# Patient Record
Sex: FEMALE | Race: WHITE | NOT HISPANIC OR LATINO | Employment: FULL TIME | URBAN - METROPOLITAN AREA
[De-identification: names, ages, dates, MRNs, and addresses within clinical notes are randomized per-mention and may not be internally consistent; named-entity substitution may affect disease eponyms.]

---

## 2017-03-10 ENCOUNTER — OFFICE VISIT (OUTPATIENT)
Dept: FAMILY MEDICINE CLINIC | Facility: CLINIC | Age: 57
End: 2017-03-10

## 2017-03-10 VITALS
HEART RATE: 97 BPM | OXYGEN SATURATION: 95 % | HEIGHT: 64 IN | SYSTOLIC BLOOD PRESSURE: 110 MMHG | RESPIRATION RATE: 16 BRPM | WEIGHT: 142.7 LBS | TEMPERATURE: 98.3 F | DIASTOLIC BLOOD PRESSURE: 80 MMHG | BODY MASS INDEX: 24.36 KG/M2

## 2017-03-10 DIAGNOSIS — M05.742 RHEUMATOID ARTHRITIS INVOLVING BOTH HANDS WITH POSITIVE RHEUMATOID FACTOR (HCC): ICD-10-CM

## 2017-03-10 DIAGNOSIS — M05.741 RHEUMATOID ARTHRITIS INVOLVING BOTH HANDS WITH POSITIVE RHEUMATOID FACTOR (HCC): ICD-10-CM

## 2017-03-10 DIAGNOSIS — Z85.3 HISTORY OF LOBULAR CARCINOMA OF BREAST: ICD-10-CM

## 2017-03-10 DIAGNOSIS — M85.80 OSTEOPENIA: ICD-10-CM

## 2017-03-10 DIAGNOSIS — I10 ESSENTIAL HYPERTENSION: ICD-10-CM

## 2017-03-10 DIAGNOSIS — Z00.00 HEALTH CARE MAINTENANCE: Primary | ICD-10-CM

## 2017-03-10 DIAGNOSIS — K21.9 GASTROESOPHAGEAL REFLUX DISEASE, ESOPHAGITIS PRESENCE NOT SPECIFIED: ICD-10-CM

## 2017-03-10 PROCEDURE — 99396 PREV VISIT EST AGE 40-64: CPT | Performed by: INTERNAL MEDICINE

## 2017-03-10 RX ORDER — ATENOLOL 25 MG/1
25 TABLET ORAL
COMMUNITY
End: 2017-03-10 | Stop reason: SDUPTHER

## 2017-03-10 RX ORDER — LEFLUNOMIDE 20 MG/1
10 TABLET ORAL DAILY
COMMUNITY
End: 2022-02-10

## 2017-03-10 RX ORDER — ATENOLOL 25 MG/1
25 TABLET ORAL DAILY
Qty: 90 TABLET | Refills: 3 | Status: SHIPPED | OUTPATIENT
Start: 2017-03-10 | End: 2018-04-16 | Stop reason: SDUPTHER

## 2017-03-10 RX ORDER — PANTOPRAZOLE SODIUM 40 MG/1
40 TABLET, DELAYED RELEASE ORAL DAILY
Qty: 90 TABLET | Refills: 3 | Status: SHIPPED | OUTPATIENT
Start: 2017-03-10 | End: 2018-05-11 | Stop reason: SDUPTHER

## 2017-03-10 RX ORDER — FLUCONAZOLE 150 MG/1
TABLET ORAL
Refills: 1 | COMMUNITY
Start: 2017-02-22 | End: 2017-03-27 | Stop reason: SDUPTHER

## 2017-03-10 RX ORDER — PANTOPRAZOLE SODIUM 40 MG/1
40 TABLET, DELAYED RELEASE ORAL DAILY
COMMUNITY
End: 2017-03-10 | Stop reason: SDUPTHER

## 2017-03-10 RX ORDER — MONTELUKAST SODIUM 10 MG/1
TABLET ORAL
COMMUNITY
Start: 2017-02-18 | End: 2018-05-11 | Stop reason: SDUPTHER

## 2017-03-10 RX ORDER — CIPROFLOXACIN 500 MG/1
TABLET, FILM COATED ORAL
COMMUNITY
Start: 2017-03-08 | End: 2017-03-17

## 2017-03-10 RX ORDER — ANASTROZOLE 1 MG/1
1 TABLET ORAL
COMMUNITY
End: 2018-07-16

## 2017-03-10 RX ORDER — IBANDRONATE SODIUM 150 MG/1
150 TABLET, FILM COATED ORAL
COMMUNITY
End: 2018-11-10 | Stop reason: HOSPADM

## 2017-03-10 RX ORDER — MULTIVITAMIN
1 CAPSULE ORAL DAILY
COMMUNITY

## 2017-03-10 NOTE — PROGRESS NOTES
Subjective   Patient ID: Bonnie Syed is a 56 y.o. female presents with   Chief Complaint   Patient presents with   • Annual Exam     New Patient- needs a new dr       HPI - this patient has a long history of rheumatoid arthritis currently she is on Remicade and I rave and is well controlled.  She also has a history of breast cancer and she is on her last year of arimadex.  She has osteopenia and is on Boniva calcium and vitamin D.  She gets routine labs done through her various specialists.  She is caught up on her colonoscopy and mammogram.  Overall she feels good and is active.  She does have some reflux and hypertension that are well controlled with current medicines we will refill those.    Assessment plan    Health care maintenance-caught up on all screenings recommend continued activity and screenings.    GERD refilled Protonix 40    Hypertension controlled with atenolol 25    Osteopenia Boniva    Estrogen receptor breast cancer she is on anti-hormonal therapy    RA-she sees rheumatology and is on medicines.    Recent UTI she is on Diflucan and Cipro.    Allergies   Allergen Reactions   • Latex    • Sulfa Antibiotics        The following portions of the patient's history were reviewed and updated as appropriate: allergies, current medications, past family history, past medical history, past social history, past surgical history and problem list.      Review of Systems   Constitutional: Negative.    HENT: Negative.    Eyes: Negative.    Respiratory: Negative.    Cardiovascular: Negative.    Gastrointestinal: Negative.    Endocrine: Negative.    Genitourinary: Positive for dysuria.        Dysuria is improving   Musculoskeletal: Positive for arthralgias and joint swelling.   Skin: Negative.    Allergic/Immunologic: Negative.    Neurological: Negative.    Hematological: Negative.    Psychiatric/Behavioral: Negative.        Objective     Vitals:    03/10/17 1534   BP: 110/80   Pulse: 97   Resp: 16   Temp: 98.3 °F  "(36.8 °C)   TempSrc: Oral   SpO2: 95%   Weight: 142 lb 11.2 oz (64.7 kg)   Height: 64\" (162.6 cm)         Physical Exam   Constitutional: She is oriented to person, place, and time. She appears well-developed and well-nourished. No distress.   HENT:   Head: Normocephalic and atraumatic.   Eyes: Conjunctivae and EOM are normal. Pupils are equal, round, and reactive to light. Right eye exhibits no discharge. Left eye exhibits no discharge. No scleral icterus.   Neck: Normal range of motion. Neck supple. No tracheal deviation present. No thyromegaly present.   Cardiovascular: Normal rate, regular rhythm, normal heart sounds and normal pulses.  Exam reveals no gallop.    No murmur heard.  Pulmonary/Chest: Effort normal and breath sounds normal. No respiratory distress. She has no wheezes. She has no rales.   Abdominal: Soft. Bowel sounds are normal. There is no tenderness.   Musculoskeletal: Normal range of motion.   Neurological: She is alert and oriented to person, place, and time.   Skin: Skin is warm and dry. No rash noted. No erythema. No pallor.   Psychiatric: She has a normal mood and affect. Her behavior is normal. Judgment and thought content normal.   Nursing note and vitals reviewed.        Bonnie was seen today for annual exam.    Diagnoses and all orders for this visit:    Health care maintenance    Rheumatoid arthritis involving both hands with positive rheumatoid factor  -     Lipid Panel  -     TSH+Free T4  -     Comprehensive Metabolic Panel    Gastroesophageal reflux disease, esophagitis presence not specified  -     Lipid Panel  -     TSH+Free T4  -     Comprehensive Metabolic Panel    Essential hypertension  -     Lipid Panel  -     TSH+Free T4  -     Comprehensive Metabolic Panel    Osteopenia  -     Lipid Panel  -     TSH+Free T4  -     Comprehensive Metabolic Panel    History of lobular carcinoma of breast  -     Lipid Panel  -     TSH+Free T4  -     Comprehensive Metabolic Panel        Call or " return to clinic prn if these symptoms worsen or fail to improve as anticipated.

## 2017-03-17 ENCOUNTER — OFFICE VISIT (OUTPATIENT)
Dept: FAMILY MEDICINE CLINIC | Facility: CLINIC | Age: 57
End: 2017-03-17

## 2017-03-17 VITALS
WEIGHT: 142 LBS | TEMPERATURE: 98.5 F | HEIGHT: 64 IN | RESPIRATION RATE: 16 BRPM | BODY MASS INDEX: 24.24 KG/M2 | SYSTOLIC BLOOD PRESSURE: 110 MMHG | HEART RATE: 102 BPM | DIASTOLIC BLOOD PRESSURE: 80 MMHG | OXYGEN SATURATION: 99 %

## 2017-03-17 DIAGNOSIS — I10 ESSENTIAL HYPERTENSION: Primary | ICD-10-CM

## 2017-03-17 DIAGNOSIS — M05.742 RHEUMATOID ARTHRITIS INVOLVING BOTH HANDS WITH POSITIVE RHEUMATOID FACTOR (HCC): ICD-10-CM

## 2017-03-17 DIAGNOSIS — N30.01 ACUTE CYSTITIS WITH HEMATURIA: ICD-10-CM

## 2017-03-17 DIAGNOSIS — M05.741 RHEUMATOID ARTHRITIS INVOLVING BOTH HANDS WITH POSITIVE RHEUMATOID FACTOR (HCC): ICD-10-CM

## 2017-03-17 LAB
BILIRUB BLD-MCNC: NEGATIVE MG/DL
CLARITY, POC: CLEAR
COLOR UR: YELLOW
GLUCOSE UR STRIP-MCNC: NEGATIVE MG/DL
KETONES UR QL: NEGATIVE
LEUKOCYTE EST, POC: NEGATIVE
NITRITE UR-MCNC: NEGATIVE MG/ML
PH UR: 5.5 [PH] (ref 5–8)
PROT UR STRIP-MCNC: NEGATIVE MG/DL
RBC # UR STRIP: NEGATIVE /UL
SP GR UR: 1.02 (ref 1–1.03)
UROBILINOGEN UR QL: NORMAL

## 2017-03-17 PROCEDURE — 96372 THER/PROPH/DIAG INJ SC/IM: CPT | Performed by: FAMILY MEDICINE

## 2017-03-17 PROCEDURE — 81003 URINALYSIS AUTO W/O SCOPE: CPT | Performed by: FAMILY MEDICINE

## 2017-03-17 PROCEDURE — 99213 OFFICE O/P EST LOW 20 MIN: CPT | Performed by: FAMILY MEDICINE

## 2017-03-17 RX ORDER — CEFTRIAXONE 500 MG/1
500 INJECTION, POWDER, FOR SOLUTION INTRAMUSCULAR; INTRAVENOUS EVERY 24 HOURS
Status: DISCONTINUED | OUTPATIENT
Start: 2017-03-17 | End: 2018-05-11

## 2017-03-17 RX ORDER — PHENAZOPYRIDINE HYDROCHLORIDE 200 MG/1
200 TABLET, FILM COATED ORAL 3 TIMES DAILY PRN
Qty: 20 TABLET | Refills: 0 | Status: SHIPPED | OUTPATIENT
Start: 2017-03-17 | End: 2017-08-07

## 2017-03-17 RX ADMIN — CEFTRIAXONE 500 MG: 500 INJECTION, POWDER, FOR SOLUTION INTRAMUSCULAR; INTRAVENOUS at 13:43

## 2017-03-17 NOTE — PROGRESS NOTES
"Subjective   Bonnie Syed is a 56 y.o. female presenting with   Chief Complaint   Patient presents with   • Urinary Frequency   • Abdominal Pain     lower abdominal area        HPI Comments: This is a very nice 56-year-old white female nonsmoker with a history of rheumatoid arthritis since she was 3 years old and also a history of breast cancer that is currently in remission who normally gets Remicade infusions and is due for her next one next week.  However last week she developed abdominal pain and cramping and [facility] where she was found to have a urinary tract infection and given Cipro.  She said it did nothing and she continued to have marked dysuria.  She had some Macrobid that she never took for a past infection so she began on that and said she is getting better now but still has some cramping and wanted to come in to get checked.  She also says that she is concerned about the Remicade infusion under the care of her rheumatologist, Dr. Staples.  I suggested she put that off for a week and she seems to like that idea.    Fortunately she does not have any high fever or chills or flank pain or dizziness or loss of consciousness.  She still has 4 more days of the Macrobid and her urinalysis today is normal, but nevertheless because of the immunosuppression, I will request a urine culture and administer Rocephin.    Urinary Frequency    Associated symptoms include frequency.   Abdominal Pain   Associated symptoms include dysuria and frequency.        The following portions of the patient's history were reviewed and updated as appropriate: current medications, past family history, past medical history, past social history, past surgical history and problem list.    Review of Systems   Gastrointestinal: Positive for abdominal pain.   Genitourinary: Positive for dysuria and frequency.   All other systems reviewed and are negative.      Objective   Physical Exam   Constitutional: She is oriented to " person, place, and time. She appears well-developed and well-nourished. No distress.   HENT:   Head: Normocephalic and atraumatic.   Eyes: EOM are normal. Pupils are equal, round, and reactive to light.   Neck: Normal range of motion. Neck supple. No thyromegaly present.   Cardiovascular: Normal rate and regular rhythm.    No murmur heard.  Pulmonary/Chest: Effort normal and breath sounds normal. No respiratory distress. She has no wheezes.   Abdominal: Soft. Bowel sounds are normal. She exhibits no distension and no mass. There is no tenderness. There is no guarding.   Musculoskeletal: Normal range of motion. She exhibits deformity (deformity of multiple joints in her hands).   Lymphadenopathy:     She has no cervical adenopathy.   Neurological: She is alert and oriented to person, place, and time.   Skin: Skin is warm and dry. She is not diaphoretic.   Psychiatric: She has a normal mood and affect. Her behavior is normal.   Nursing note and vitals reviewed.      Assessment/Plan   Bonnie was seen today for urinary frequency and abdominal pain.    Diagnoses and all orders for this visit:    Essential hypertension    Rheumatoid arthritis involving both hands with positive rheumatoid factor    Acute cystitis with hematuria  -     cefTRIAXone (ROCEPHIN) injection 500 mg; Inject 500 mg into the shoulder, thigh, or buttocks Daily.  -     Urine Culture    Other orders  -     phenazopyridine (PYRIDIUM) 200 MG tablet; Take 1 tablet by mouth 3 (Three) Times a Day As Needed for bladder spasms.                   I would like him to return for another visit in 6 month(s)

## 2017-03-17 NOTE — PATIENT INSTRUCTIONS
This is a very nice 56-year-old who has rheumatoid arthritis on Remicade and now has acute cystitis.  I will request a culture and give her 500 mg of ceftriaxone.  I would like her to call if she does not get better.

## 2017-03-19 LAB
BACTERIA UR CULT: NO GROWTH
BACTERIA UR CULT: NORMAL

## 2017-03-23 ENCOUNTER — TELEPHONE (OUTPATIENT)
Dept: FAMILY MEDICINE CLINIC | Facility: CLINIC | Age: 57
End: 2017-03-23

## 2017-03-23 NOTE — TELEPHONE ENCOUNTER
Still having uti symptoms her culture that carreon did came back normal but she is still having a slight problem. She wants to know if you would call something in for her?

## 2017-03-24 RX ORDER — CIPROFLOXACIN 500 MG/1
500 TABLET, FILM COATED ORAL 2 TIMES DAILY
Qty: 10 TABLET | Refills: 0 | Status: SHIPPED | OUTPATIENT
Start: 2017-03-24 | End: 2017-08-07

## 2017-03-24 RX ORDER — DOXYCYCLINE HYCLATE 100 MG
100 TABLET ORAL 2 TIMES DAILY
Qty: 14 TABLET | Refills: 0 | Status: SHIPPED | OUTPATIENT
Start: 2017-03-24 | End: 2017-08-07

## 2017-03-24 RX ORDER — NITROFURANTOIN 25; 75 MG/1; MG/1
100 CAPSULE ORAL 2 TIMES DAILY
Qty: 14 CAPSULE | Refills: 0 | Status: SHIPPED | OUTPATIENT
Start: 2017-03-24 | End: 2017-08-07

## 2017-03-24 NOTE — TELEPHONE ENCOUNTER
She states she can not take Cipro, she was taking microbid and wants to know if we can call this in for her?    She also said she will need diflucan with any antibiotic as well.

## 2017-03-24 NOTE — TELEPHONE ENCOUNTER
I saw her once when Dr. KEYS was out, but he has only seen her once too, so if he wants me to take over, I will be happy to.

## 2017-03-27 RX ORDER — FLUCONAZOLE 150 MG/1
150 TABLET ORAL DAILY
Qty: 1 TABLET | Refills: 1 | Status: SHIPPED | OUTPATIENT
Start: 2017-03-27 | End: 2017-08-07

## 2017-03-29 ENCOUNTER — TELEPHONE (OUTPATIENT)
Dept: FAMILY MEDICINE CLINIC | Facility: CLINIC | Age: 57
End: 2017-03-29

## 2017-03-29 RX ORDER — METHENAMINE, SODIUM PHOSPHATE, MONOBASIC, MONOHYDRATE, PHENYL SALICYLATE, METHYLENE BLUE, AND HYOSCYAMINE SULFATE 120; 40.8; 36; 10; .12 MG/1; MG/1; MG/1; MG/1; MG/1
1 CAPSULE ORAL 4 TIMES DAILY PRN
Qty: 20 CAPSULE | Refills: 0 | Status: SHIPPED | OUTPATIENT
Start: 2017-03-29 | End: 2017-08-07

## 2017-03-29 NOTE — TELEPHONE ENCOUNTER
Please inform the patient that I sent in a medicine for bladder discomfort.  I will be glad to set her up with a uro-gynecologist if she would like for further evaluation and treatment

## 2017-03-29 NOTE — TELEPHONE ENCOUNTER
Pt informed and will think about going to a uro- gyn and will call us if she decides to go that route

## 2017-03-29 NOTE — TELEPHONE ENCOUNTER
Bonnie said that she has been on the Doxycycline since you called it in and still has no relief. She states she has been on the computer on [employer] and thinks she is having bladder spasms. She wants to know can she get anything called in to help her with this? or do you think that there is something else she should be taking to help the uti?

## 2017-08-07 ENCOUNTER — TELEPHONE (OUTPATIENT)
Dept: FAMILY MEDICINE CLINIC | Facility: CLINIC | Age: 57
End: 2017-08-07

## 2017-08-07 ENCOUNTER — OFFICE VISIT (OUTPATIENT)
Dept: FAMILY MEDICINE CLINIC | Facility: CLINIC | Age: 57
End: 2017-08-07

## 2017-08-07 VITALS
HEIGHT: 64 IN | WEIGHT: 146 LBS | TEMPERATURE: 98.2 F | RESPIRATION RATE: 16 BRPM | SYSTOLIC BLOOD PRESSURE: 120 MMHG | DIASTOLIC BLOOD PRESSURE: 84 MMHG | BODY MASS INDEX: 24.92 KG/M2 | HEART RATE: 94 BPM | OXYGEN SATURATION: 98 %

## 2017-08-07 DIAGNOSIS — B37.31 VAGINAL YEAST INFECTION: Primary | ICD-10-CM

## 2017-08-07 DIAGNOSIS — R39.9 UTI SYMPTOMS: Primary | ICD-10-CM

## 2017-08-07 DIAGNOSIS — N30.01 ACUTE CYSTITIS WITH HEMATURIA: ICD-10-CM

## 2017-08-07 LAB
BILIRUB BLD-MCNC: NEGATIVE MG/DL
CLARITY, POC: CLEAR
COLOR UR: YELLOW
GLUCOSE UR STRIP-MCNC: NEGATIVE MG/DL
KETONES UR QL: NEGATIVE
LEUKOCYTE EST, POC: NEGATIVE
NITRITE UR-MCNC: NEGATIVE MG/ML
PH UR: 6.5 [PH] (ref 5–8)
PROT UR STRIP-MCNC: NEGATIVE MG/DL
RBC # UR STRIP: NEGATIVE /UL
SP GR UR: 1.02 (ref 1–1.03)
UROBILINOGEN UR QL: NORMAL

## 2017-08-07 PROCEDURE — 81003 URINALYSIS AUTO W/O SCOPE: CPT | Performed by: INTERNAL MEDICINE

## 2017-08-07 PROCEDURE — 99213 OFFICE O/P EST LOW 20 MIN: CPT | Performed by: INTERNAL MEDICINE

## 2017-08-07 RX ORDER — FLUCONAZOLE 200 MG/1
200 TABLET ORAL DAILY
Qty: 1 TABLET | Refills: 0 | Status: SHIPPED | OUTPATIENT
Start: 2017-08-07 | End: 2018-07-16

## 2017-08-07 NOTE — PROGRESS NOTES
"Subjective   Patient ID: Bonnie Syed is a 56 y.o. female presents with   Chief Complaint   Patient presents with   • Urinary Tract Infection       HPI - This patient has had a UTI starting back in July she was on Macrobid and then they recultured her and put her on another antibiotic.  She still worried she may have one she has lower sacral back pain which is not uncommon for her she has no dysuria frequency or urgency.  And no fever or flank pain.  Her urinalysis was completely normal    Assessment plan    Lumbosacral pain-she will go to her chiropractor    Vaginal yeast infection Diflucan    Acute cystitis-resolved    Allergies   Allergen Reactions   • Sulfa Antibiotics        The following portions of the patient's history were reviewed and updated as appropriate: allergies, current medications, past family history, past medical history, past social history, past surgical history and problem list.      Review of Systems   Constitutional: Negative.    Genitourinary: Negative.    Musculoskeletal: Positive for back pain.       Objective     Vitals:    08/07/17 0955   BP: 120/84   Pulse: 94   Resp: 16   Temp: 98.2 °F (36.8 °C)   TempSrc: Oral   SpO2: 98%   Weight: 146 lb (66.2 kg)   Height: 64\" (162.6 cm)         Physical Exam   Constitutional: She appears well-developed and well-nourished.   Abdominal: Soft. She exhibits no distension. There is no tenderness.   Psychiatric: She has a normal mood and affect. Her behavior is normal.   Nursing note and vitals reviewed.        Bonnie was seen today for urinary tract infection.    Diagnoses and all orders for this visit:    Vaginal yeast infection    Acute cystitis with hematuria    Other orders  -     fluconazole (DIFLUCAN) 200 MG tablet; Take 1 tablet by mouth Daily.        Call or return to clinic prn if these symptoms worsen or fail to improve as anticipated.  "

## 2017-08-09 LAB
BACTERIA UR CULT: NO GROWTH
BACTERIA UR CULT: NORMAL

## 2017-08-17 ENCOUNTER — TELEPHONE (OUTPATIENT)
Dept: FAMILY MEDICINE CLINIC | Facility: CLINIC | Age: 57
End: 2017-08-17

## 2017-08-17 NOTE — TELEPHONE ENCOUNTER
Could you get the phone number and address to Dr. Shaikh who is a uro-gynecologist and send this to the patient.

## 2017-08-17 NOTE — TELEPHONE ENCOUNTER
Bonnie wants the name and number of that uro-gyn dr you refer to because she feels like she is going into menopause.

## 2017-12-04 ENCOUNTER — TELEPHONE (OUTPATIENT)
Dept: FAMILY MEDICINE CLINIC | Facility: CLINIC | Age: 57
End: 2017-12-04

## 2017-12-04 NOTE — TELEPHONE ENCOUNTER
Bonnie takes Remacaid infusions once a month and she wants to know should she take a pneumonia and a shingles vaccine? She is unsure if she ever had chicken pox? Can you take these together? And can we do these if she needs them?

## 2017-12-04 NOTE — TELEPHONE ENCOUNTER
She can take the pneumonia shot but she cannot take the shingles vaccine if she is on Remicade.  Usually we don't have people take the shingles vaccine until age 60 any way

## 2018-04-16 RX ORDER — ATENOLOL 25 MG/1
25 TABLET ORAL DAILY
Qty: 90 TABLET | Refills: 0 | Status: SHIPPED | OUTPATIENT
Start: 2018-04-16 | End: 2018-07-19 | Stop reason: SDUPTHER

## 2018-05-11 ENCOUNTER — OFFICE VISIT (OUTPATIENT)
Dept: FAMILY MEDICINE CLINIC | Facility: CLINIC | Age: 58
End: 2018-05-11

## 2018-05-11 VITALS
SYSTOLIC BLOOD PRESSURE: 140 MMHG | HEART RATE: 106 BPM | BODY MASS INDEX: 24.24 KG/M2 | WEIGHT: 142 LBS | HEIGHT: 64 IN | RESPIRATION RATE: 18 BRPM | OXYGEN SATURATION: 94 % | DIASTOLIC BLOOD PRESSURE: 84 MMHG | TEMPERATURE: 98.5 F

## 2018-05-11 DIAGNOSIS — R21 RASH: Primary | ICD-10-CM

## 2018-05-11 PROCEDURE — 99213 OFFICE O/P EST LOW 20 MIN: CPT | Performed by: INTERNAL MEDICINE

## 2018-05-11 RX ORDER — BUSPIRONE HYDROCHLORIDE 5 MG/1
5 TABLET ORAL 3 TIMES DAILY PRN
Qty: 20 TABLET | Refills: 1 | Status: SHIPPED | OUTPATIENT
Start: 2018-05-11 | End: 2018-05-11 | Stop reason: SDUPTHER

## 2018-05-11 RX ORDER — VALACYCLOVIR HYDROCHLORIDE 1 G/1
1000 TABLET, FILM COATED ORAL 3 TIMES DAILY
Qty: 21 TABLET | Refills: 0 | Status: SHIPPED | OUTPATIENT
Start: 2018-05-11 | End: 2018-07-16

## 2018-05-11 RX ORDER — PANTOPRAZOLE SODIUM 40 MG/1
40 TABLET, DELAYED RELEASE ORAL DAILY
Qty: 90 TABLET | Refills: 3 | Status: SHIPPED | OUTPATIENT
Start: 2018-05-11 | End: 2018-07-19 | Stop reason: SDUPTHER

## 2018-05-11 RX ORDER — LEFLUNOMIDE 20 MG/1
TABLET ORAL
COMMUNITY
End: 2018-05-11 | Stop reason: SDUPTHER

## 2018-05-11 RX ORDER — PANTOPRAZOLE SODIUM 40 MG/1
TABLET, DELAYED RELEASE ORAL
COMMUNITY
Start: 2017-03-10 | End: 2018-05-11 | Stop reason: SDUPTHER

## 2018-05-11 RX ORDER — BUSPIRONE HYDROCHLORIDE 5 MG/1
5 TABLET ORAL 3 TIMES DAILY PRN
Qty: 20 TABLET | Refills: 1 | Status: SHIPPED | OUTPATIENT
Start: 2018-05-11 | End: 2018-12-28

## 2018-05-11 RX ORDER — MONTELUKAST SODIUM 10 MG/1
10 TABLET ORAL DAILY
COMMUNITY
Start: 2017-02-18

## 2018-05-11 RX ORDER — VALACYCLOVIR HYDROCHLORIDE 1 G/1
1000 TABLET, FILM COATED ORAL 3 TIMES DAILY
Qty: 21 TABLET | Refills: 0 | Status: SHIPPED | OUTPATIENT
Start: 2018-05-11 | End: 2018-05-11 | Stop reason: SDUPTHER

## 2018-05-11 NOTE — PROGRESS NOTES
"Subjective   Patient ID: Bonnie Syed is a 57 y.o. female presents with   Chief Complaint   Patient presents with   • Rash     x1 days       HPI - This patient presents today with a rash on her back ×1 days not painful.  There is no blisters.  She was concerned.    Assessment plan    Rash-unknown etiology does not look vesicular at this point we will go ahead and send in a prescription of Valtrex to take if this develops into a vesicular shingles rash.    Allergies   Allergen Reactions   • Sulfa Antibiotics        The following portions of the patient's history were reviewed and updated as appropriate: allergies, current medications, past family history, past medical history, past social history, past surgical history and problem list.      Review of Systems   Constitutional: Negative.    Respiratory: Negative.    Cardiovascular: Negative.    Skin: Positive for rash.        Slight erythematous rash looks more traumatic, does not look bacterial and currently does not look like shingles       Objective     Vitals:    05/11/18 1259   BP: 140/84   Pulse: 106   Resp: 18   Temp: 98.5 °F (36.9 °C)   TempSrc: Oral   SpO2: 94%   Weight: 64.4 kg (142 lb)   Height: 162.6 cm (64\")         Physical Exam   Constitutional: She is oriented to person, place, and time. She appears well-developed and well-nourished.   Neurological: She is alert and oriented to person, place, and time.   Skin: Rash noted.   Slight erythema, looks more traumatic does not look bacterial and currently does not look like shingles   Nursing note and vitals reviewed.        Bonnie was seen today for rash.    Diagnoses and all orders for this visit:    Rash    Other orders  -     Discontinue: valACYclovir (VALTREX) 1000 MG tablet; Take 1 tablet by mouth 3 (Three) Times a Day.  -     Discontinue: busPIRone (BUSPAR) 5 MG tablet; Take 1 tablet by mouth 3 (Three) Times a Day As Needed (anxiety).  -     valACYclovir (VALTREX) 1000 MG tablet; Take 1 tablet by mouth 3 " (Three) Times a Day.        Call or return to clinic prn if these symptoms worsen or fail to improve as anticipated.

## 2018-07-16 ENCOUNTER — OFFICE VISIT (OUTPATIENT)
Dept: FAMILY MEDICINE CLINIC | Facility: CLINIC | Age: 58
End: 2018-07-16

## 2018-07-16 VITALS
TEMPERATURE: 97.8 F | RESPIRATION RATE: 15 BRPM | BODY MASS INDEX: 24.24 KG/M2 | HEIGHT: 64 IN | SYSTOLIC BLOOD PRESSURE: 118 MMHG | HEART RATE: 75 BPM | DIASTOLIC BLOOD PRESSURE: 80 MMHG | OXYGEN SATURATION: 98 % | WEIGHT: 142 LBS

## 2018-07-16 DIAGNOSIS — R39.9 UTI SYMPTOMS: ICD-10-CM

## 2018-07-16 DIAGNOSIS — K21.9 GASTROESOPHAGEAL REFLUX DISEASE, ESOPHAGITIS PRESENCE NOT SPECIFIED: ICD-10-CM

## 2018-07-16 DIAGNOSIS — Z92.21 HISTORY OF AROMATASE INHIBITOR THERAPY: ICD-10-CM

## 2018-07-16 DIAGNOSIS — N30.01 ACUTE CYSTITIS WITH HEMATURIA: ICD-10-CM

## 2018-07-16 DIAGNOSIS — I10 ESSENTIAL HYPERTENSION: Primary | ICD-10-CM

## 2018-07-16 DIAGNOSIS — Z23 NEED FOR TDAP VACCINATION: ICD-10-CM

## 2018-07-16 DIAGNOSIS — M85.80 OSTEOPENIA, UNSPECIFIED LOCATION: ICD-10-CM

## 2018-07-16 LAB
BILIRUB BLD-MCNC: NEGATIVE MG/DL
CLARITY, POC: CLEAR
COLOR UR: YELLOW
GLUCOSE UR STRIP-MCNC: NEGATIVE MG/DL
KETONES UR QL: NEGATIVE
LEUKOCYTE EST, POC: NEGATIVE
NITRITE UR-MCNC: NEGATIVE MG/ML
PH UR: 6.5 [PH] (ref 5–8)
PROT UR STRIP-MCNC: NEGATIVE MG/DL
RBC # UR STRIP: ABNORMAL /UL
SP GR UR: 1.02 (ref 1–1.03)
UROBILINOGEN UR QL: NORMAL

## 2018-07-16 PROCEDURE — 99213 OFFICE O/P EST LOW 20 MIN: CPT | Performed by: FAMILY MEDICINE

## 2018-07-16 PROCEDURE — 81003 URINALYSIS AUTO W/O SCOPE: CPT | Performed by: FAMILY MEDICINE

## 2018-07-16 NOTE — PROGRESS NOTES
Subjective   Bonnie Syed is a 57 y.o. female.     The patient to me transferring from Dr. Ram with hypertension and recent urinary tract infection.  5 day course of Macrobid about 6 days ago as no residual symptoms.  She has had problems with recurring urinary tract infections while on Remicade.  Takes both Remicade and Arava for her rheumatoid arthritis.  She takes pantoprazole for GERD be interested in getting off of that if she can.  She uses Boniva for osteoporosis sees gynecology and rheumatology.  Had 10 years of hormonal therapy after her breast cancer surgery.  Standing hypertension has been well controlled.  She does work on exercise.         The following portions of the patient's history were reviewed and updated as appropriate: allergies, current medications, past family history, past medical history, past social history, past surgical history and problem list.    Review of Systems   Constitutional: Negative.  Negative for chills and fever.   HENT: Negative.  Negative for congestion, rhinorrhea and sore throat.    Eyes: Negative.  Negative for discharge and visual disturbance.   Respiratory: Negative.  Negative for cough and shortness of breath.    Cardiovascular: Negative.  Negative for chest pain.   Gastrointestinal: Negative.  Negative for abdominal pain, constipation, diarrhea, nausea and vomiting.   Endocrine: Negative.  Negative for polydipsia.   Genitourinary: Negative.  Negative for dysuria and hematuria.   Musculoskeletal: Negative.  Negative for arthralgias and myalgias.   Skin: Negative.  Negative for rash.   Allergic/Immunologic: Negative.    Neurological: Negative.  Negative for weakness, numbness and headaches.   Hematological: Negative.  Does not bruise/bleed easily.   Psychiatric/Behavioral: Negative.  Negative for dysphoric mood. The patient is not nervous/anxious.    All other systems reviewed and are negative.      Objective   Physical Exam   Constitutional: She is oriented to  person, place, and time. She appears well-developed and well-nourished.   HENT:   Head: Normocephalic and atraumatic.   Right Ear: External ear normal.   Left Ear: External ear normal.   Mouth/Throat: No oropharyngeal exudate.   Eyes: Conjunctivae, EOM and lids are normal. Pupils are equal, round, and reactive to light. Right eye exhibits no discharge. Left eye exhibits no discharge. No scleral icterus.   Neck: Trachea normal, normal range of motion and phonation normal. Neck supple. No thyromegaly present.   Cardiovascular: Normal rate, regular rhythm and normal heart sounds.  Exam reveals no gallop and no friction rub.    No murmur heard.  Pulmonary/Chest: Effort normal and breath sounds normal. No stridor. She has no wheezes. She has no rales.   Abdominal: Soft. She exhibits no distension. There is no tenderness.   Musculoskeletal: Normal range of motion. She exhibits no edema.   Lymphadenopathy:     She has no cervical adenopathy.   Neurological: She is alert and oriented to person, place, and time. She has normal strength. No cranial nerve deficit.   Skin: Skin is warm, dry and intact. No petechiae and no rash noted. No cyanosis. Nails show no clubbing.   Psychiatric: She has a normal mood and affect. Her speech is normal and behavior is normal. Judgment and thought content normal. Cognition and memory are normal.   Nursing note and vitals reviewed.    Reviewed labs from last year showed normal hepatic and renal function.  She does need a lipid profile.  T death needs to be updated.    UA to recheck on her urinary tract infection shows trace dip red blood cells.  Culture is pending.  Postpone her Remicade infusion of until we have clarify the issue of whether she still has urinary infection.  She will postpone fasting labs and T death until after the 31st to get the required interval and after her infusion.    She will follow up with one of my colleagues.    Assessment/Plan   Bonnie was seen today for establish  care and urinary tract infection.    Diagnoses and all orders for this visit:    Essential hypertension  -     CBC & Differential; Future  -     Comprehensive Metabolic Panel; Future  -     Lipid Panel; Future    UTI symptoms  -     POCT urinalysis dipstick, automated    Acute cystitis with hematuria    Gastroesophageal reflux disease, esophagitis presence not specified    History of aromatase inhibitor therapy  -     Urine Culture - Urine, Urine, Clean Catch    Osteopenia, unspecified location  -     Urine Culture - Urine, Urine, Clean Catch  -     CBC & Differential; Future  -     Comprehensive Metabolic Panel; Future  -     Lipid Panel; Future    Need for Tdap vaccination    Other orders  -     calcium carbonate-vitamin d (CALTRATE 600+D) 600-400 MG-UNIT per tablet; One tab po BID for osteopenia  -     Tdap Vaccine Greater Than or Equal To 6yo IM; Future      Patient Instructions   Start using the OTC Zantac/ranitidine 150 mg twice daily and then taper pantoprazole--  Every other day x 14 days then  Every third x 14 days then  Every fourth day x 14 days then  Every fifth for 14 days and then stop    Use either liquid Gaviscon or chewable calcium carbonate antacid as needed    Goals:  150 minutes of aerobic/cardio training weekly, 30 minute intervals  Combine that with resistance/strength training  Suggestion:  4 days-->40 minutes aerobic/20 minutes resistance  Watch carbohydrates (sugars/starches)    We will contact you about labs    Fasting labs and Tdap after July 31     Schedule 6 month recheck with provider of choice    IT IS VERY IMPORTANT THAT YOU KEEP A PRINTED LIST OF ALL OF YOUR MEDICATIONS AND DOSES AND OF ALL MEDICATION ALLERGIES WITH YOU/ON YOUR PERSON AT ALL TIMES.  THIS IS OF CRITICAL IMPORTANCE IN THE EVENT THAT YOU ARE INJURED OR ILL AND ARE UNABLE TO CONVEY THIS INFORMATION TO THOSE CARING FOR YOU IN AN EMERGENCY SITUATION.                    EMR Dragon/Transcription disclaimer:   Much of this  encounter note is an electronic transcription/translation of spoken language to printed text. The electronic translation of spoken language may permit erroneous, or at times, nonsensical words or phrases to be inadvertently transcribed; Although I have reviewed the note for such errors, some may still exist. Please contact me with any questions or concerns about the conduct of this encounter note.

## 2018-07-16 NOTE — PATIENT INSTRUCTIONS
Start using the OTC Zantac/ranitidine 150 mg twice daily and then taper pantoprazole--  Every other day x 14 days then  Every third x 14 days then  Every fourth day x 14 days then  Every fifth for 14 days and then stop    Use either liquid Gaviscon or chewable calcium carbonate antacid as needed    Goals:  150 minutes of aerobic/cardio training weekly, 30 minute intervals  Combine that with resistance/strength training  Suggestion:  4 days-->40 minutes aerobic/20 minutes resistance  Watch carbohydrates (sugars/starches)    We will contact you about labs    Fasting labs and Tdap after July 31     Schedule 6 month recheck with provider of choice    IT IS VERY IMPORTANT THAT YOU KEEP A PRINTED LIST OF ALL OF YOUR MEDICATIONS AND DOSES AND OF ALL MEDICATION ALLERGIES WITH YOU/ON YOUR PERSON AT ALL TIMES.  THIS IS OF CRITICAL IMPORTANCE IN THE EVENT THAT YOU ARE INJURED OR ILL AND ARE UNABLE TO CONVEY THIS INFORMATION TO THOSE CARING FOR YOU IN AN EMERGENCY SITUATION.

## 2018-07-17 ENCOUNTER — TELEPHONE (OUTPATIENT)
Dept: SPORTS MEDICINE | Facility: CLINIC | Age: 58
End: 2018-07-17

## 2018-07-17 RX ORDER — FLUCONAZOLE 150 MG/1
TABLET ORAL
Qty: 2 TABLET | Refills: 0 | Status: SHIPPED | OUTPATIENT
Start: 2018-07-17 | End: 2018-08-20 | Stop reason: SDUPTHER

## 2018-07-17 RX ORDER — CEPHALEXIN 500 MG/1
CAPSULE ORAL
Qty: 21 CAPSULE | Refills: 0 | Status: SHIPPED | OUTPATIENT
Start: 2018-07-17 | End: 2018-08-20

## 2018-07-18 LAB
BACTERIA UR CULT: NO GROWTH
BACTERIA UR CULT: NORMAL

## 2018-07-19 RX ORDER — ATENOLOL 25 MG/1
25 TABLET ORAL DAILY
Qty: 90 TABLET | Refills: 3 | Status: SHIPPED | OUTPATIENT
Start: 2018-07-19 | End: 2019-06-19 | Stop reason: SDUPTHER

## 2018-07-19 RX ORDER — PANTOPRAZOLE SODIUM 40 MG/1
40 TABLET, DELAYED RELEASE ORAL DAILY
Qty: 90 TABLET | Refills: 3 | Status: SHIPPED | OUTPATIENT
Start: 2018-07-19 | End: 2018-12-28

## 2018-07-24 ENCOUNTER — TELEPHONE (OUTPATIENT)
Dept: FAMILY MEDICINE CLINIC | Facility: CLINIC | Age: 58
End: 2018-07-24

## 2018-07-25 NOTE — TELEPHONE ENCOUNTER
Dr Abbott will see this patient. Please call and schedule thanks also let her know he doesn't prescribe controlled meds

## 2018-07-31 ENCOUNTER — RESULTS ENCOUNTER (OUTPATIENT)
Dept: FAMILY MEDICINE CLINIC | Facility: CLINIC | Age: 58
End: 2018-07-31

## 2018-07-31 DIAGNOSIS — I10 ESSENTIAL HYPERTENSION: ICD-10-CM

## 2018-07-31 DIAGNOSIS — M85.80 OSTEOPENIA, UNSPECIFIED LOCATION: ICD-10-CM

## 2018-08-20 ENCOUNTER — OFFICE VISIT (OUTPATIENT)
Dept: FAMILY MEDICINE CLINIC | Facility: CLINIC | Age: 58
End: 2018-08-20

## 2018-08-20 VITALS
HEART RATE: 67 BPM | OXYGEN SATURATION: 98 % | WEIGHT: 141 LBS | RESPIRATION RATE: 18 BRPM | TEMPERATURE: 98.2 F | HEIGHT: 64 IN | SYSTOLIC BLOOD PRESSURE: 118 MMHG | DIASTOLIC BLOOD PRESSURE: 60 MMHG | BODY MASS INDEX: 24.07 KG/M2

## 2018-08-20 DIAGNOSIS — R35.0 URINARY FREQUENCY: Primary | ICD-10-CM

## 2018-08-20 DIAGNOSIS — Z79.60 LONG-TERM USE OF IMMUNOSUPPRESSANT MEDICATION: ICD-10-CM

## 2018-08-20 LAB
BILIRUB BLD-MCNC: NEGATIVE MG/DL
CLARITY, POC: CLEAR
COLOR UR: YELLOW
GLUCOSE UR STRIP-MCNC: NEGATIVE MG/DL
KETONES UR QL: NEGATIVE
LEUKOCYTE EST, POC: NEGATIVE
NITRITE UR-MCNC: NEGATIVE MG/ML
PH UR: 5.5 [PH] (ref 5–8)
PROT UR STRIP-MCNC: NEGATIVE MG/DL
RBC # UR STRIP: NEGATIVE /UL
SP GR UR: 1.01 (ref 1–1.03)
UROBILINOGEN UR QL: NORMAL

## 2018-08-20 PROCEDURE — 81003 URINALYSIS AUTO W/O SCOPE: CPT | Performed by: FAMILY MEDICINE

## 2018-08-20 PROCEDURE — 99213 OFFICE O/P EST LOW 20 MIN: CPT | Performed by: FAMILY MEDICINE

## 2018-08-20 RX ORDER — OXYBUTYNIN CHLORIDE 5 MG/1
TABLET ORAL
Qty: 15 TABLET | Refills: 0 | Status: ON HOLD | OUTPATIENT
Start: 2018-08-20 | End: 2018-11-07

## 2018-08-20 RX ORDER — FLUCONAZOLE 150 MG/1
TABLET ORAL
Qty: 2 TABLET | Refills: 0 | Status: SHIPPED | OUTPATIENT
Start: 2018-08-20 | End: 2018-12-28

## 2018-08-20 RX ORDER — NITROFURANTOIN 25; 75 MG/1; MG/1
CAPSULE ORAL
Qty: 14 CAPSULE | Refills: 0 | Status: ON HOLD | OUTPATIENT
Start: 2018-08-20 | End: 2018-11-07

## 2018-08-20 NOTE — PROGRESS NOTES
Torres Syed is a 57 y.o. female.     Recurrence of urinary frequency without dysuria today.  She does this with a fair bit of consistency since being on Remicade.  Her cultures tend always been negative but she also hydrates aggressively as soon as symptoms start.  She responds promptly to antibiotic therapy.  She has no fever or vaginal discharge or other complaints.  Her chronic wounds are stable         The following portions of the patient's history were reviewed and updated as appropriate: allergies, current medications, past family history, past medical history, past social history, past surgical history and problem list.    Review of Systems   Constitutional: Negative for chills and fever.   HENT: Negative for congestion, rhinorrhea and sore throat.    Eyes: Negative for discharge and visual disturbance.   Respiratory: Negative for cough and shortness of breath.    Cardiovascular: Negative for chest pain.   Gastrointestinal: Negative for abdominal pain, constipation, diarrhea, nausea and vomiting.   Endocrine: Negative for polydipsia.   Genitourinary: Positive for frequency. Negative for dysuria and hematuria.   Musculoskeletal: Positive for arthralgias. Negative for myalgias.   Skin: Negative for rash.   Allergic/Immunologic: Negative.    Neurological: Negative for weakness, numbness and headaches.   Hematological: Does not bruise/bleed easily.   Psychiatric/Behavioral: Negative for dysphoric mood. The patient is not nervous/anxious.    All other systems reviewed and are negative.      Objective   Physical Exam   Constitutional: She is oriented to person, place, and time. She appears well-developed and well-nourished.   HENT:   Head: Normocephalic and atraumatic.   Eyes: Conjunctivae and EOM are normal.   Neck: Normal range of motion.   Pulmonary/Chest: Effort normal.   Musculoskeletal: Normal range of motion.   Neurological: She is alert and oriented to person, place, and time.   Skin: Skin is  warm and dry.   Psychiatric: She has a normal mood and affect. Her behavior is normal. Judgment and thought content normal.   Nursing note and vitals reviewed.    Viewed previous cultures which show no growth.  Assessment/Plan   Bonnie was seen today for urinary frequency.    Diagnoses and all orders for this visit:    Urinary frequency  -     POCT urinalysis dipstick, automated  -     Urine Culture - Urine, Urine, Clean Catch    Long-term use of immunosuppressant medication    Other orders  -     fluconazole (DIFLUCAN) 150 MG tablet; 1 tab by mouth for vaginitis and may repeat if needed after 72 hours  -     nitrofurantoin, macrocrystal-monohydrate, (MACROBID) 100 MG capsule; One capsule po BID until completed for infection  -     oxybutynin (DITROPAN) 5 MG tablet; One tablet po TID for bladder spasms/discomfort      Patient Instructions   Start antibiotic (nitrofurantoin) today and take all 7days  To lessen risk of antibiotic-associated colitis/diarrhea,take your probiotic twice daily instead of once for two weeks    If you develop fever while being treated for urinary infection, day or night, go immediately to the ER .    If residual symptoms when antibiotic completed, be re-evaluated immediately.  If no symptoms, no specific follow-up is needed.    Medication for bladder symptoms is 3x/day    Use the Diflucan/fluticasone only if needed for yeast infection              EMR Dragon/Transcription disclaimer:   Much of this encounter note is an electronic transcription/translation of spoken language to printed text. The electronic translation of spoken language may permit erroneous, or at times, nonsensical words or phrases to be inadvertently transcribed; Although I have reviewed the note for such errors, some may still exist. Please contact me with any questions or concerns about the conduct of this encounter note.

## 2018-08-20 NOTE — PATIENT INSTRUCTIONS
Start antibiotic (nitrofurantoin) today and take all 7days  To lessen risk of antibiotic-associated colitis/diarrhea,take your probiotic twice daily instead of once for two weeks    If you develop fever while being treated for urinary infection, day or night, go immediately to the ER .    If residual symptoms when antibiotic completed, be re-evaluated immediately.  If no symptoms, no specific follow-up is needed.    Medication for bladder symptoms is 3x/day    Use the Diflucan/fluticasone only if needed for yeast infection

## 2018-08-22 LAB
BACTERIA UR CULT: NORMAL
BACTERIA UR CULT: NORMAL

## 2018-11-07 ENCOUNTER — HOSPITAL ENCOUNTER (INPATIENT)
Facility: HOSPITAL | Age: 58
LOS: 3 days | Discharge: HOME HEALTH | End: 2018-11-10
Attending: EMERGENCY MEDICINE | Admitting: INTERNAL MEDICINE

## 2018-11-07 ENCOUNTER — APPOINTMENT (OUTPATIENT)
Dept: GENERAL RADIOLOGY | Facility: HOSPITAL | Age: 58
End: 2018-11-07

## 2018-11-07 DIAGNOSIS — S72.322A CLOSED DISPLACED TRANSVERSE FRACTURE OF SHAFT OF LEFT FEMUR, INITIAL ENCOUNTER (HCC): Primary | ICD-10-CM

## 2018-11-07 DIAGNOSIS — R26.2 DIFFICULTY WALKING: ICD-10-CM

## 2018-11-07 LAB
ABO GROUP BLD: NORMAL
ALBUMIN SERPL-MCNC: 4.7 G/DL (ref 3.5–5.2)
ALBUMIN/GLOB SERPL: 1.5 G/DL
ALP SERPL-CCNC: 83 U/L (ref 39–117)
ALT SERPL W P-5'-P-CCNC: 33 U/L (ref 1–33)
ANION GAP SERPL CALCULATED.3IONS-SCNC: 13 MMOL/L
AST SERPL-CCNC: 35 U/L (ref 1–32)
BASOPHILS # BLD AUTO: 0.02 10*3/MM3 (ref 0–0.2)
BASOPHILS NFR BLD AUTO: 0.4 % (ref 0–1.5)
BILIRUB SERPL-MCNC: 0.3 MG/DL (ref 0.1–1.2)
BLD GP AB SCN SERPL QL: NEGATIVE
BUN BLD-MCNC: 19 MG/DL (ref 6–20)
BUN/CREAT SERPL: 22.4 (ref 7–25)
CALCIUM SPEC-SCNC: 10.2 MG/DL (ref 8.6–10.5)
CHLORIDE SERPL-SCNC: 101 MMOL/L (ref 98–107)
CO2 SERPL-SCNC: 26 MMOL/L (ref 22–29)
CREAT BLD-MCNC: 0.85 MG/DL (ref 0.57–1)
DEPRECATED RDW RBC AUTO: 39.7 FL (ref 37–54)
EOSINOPHIL # BLD AUTO: 0.22 10*3/MM3 (ref 0–0.7)
EOSINOPHIL NFR BLD AUTO: 4 % (ref 0.3–6.2)
ERYTHROCYTE [DISTWIDTH] IN BLOOD BY AUTOMATED COUNT: 13.2 % (ref 11.7–13)
GFR SERPL CREATININE-BSD FRML MDRD: 69 ML/MIN/1.73
GLOBULIN UR ELPH-MCNC: 3.2 GM/DL
GLUCOSE BLD-MCNC: 117 MG/DL (ref 65–99)
HCT VFR BLD AUTO: 44.6 % (ref 35.6–45.5)
HGB BLD-MCNC: 15 G/DL (ref 11.9–15.5)
HOLD SPECIMEN: NORMAL
HOLD SPECIMEN: NORMAL
IMM GRANULOCYTES # BLD: 0.01 10*3/MM3 (ref 0–0.03)
IMM GRANULOCYTES NFR BLD: 0.2 % (ref 0–0.5)
INR PPP: 1.07 (ref 0.9–1.1)
LYMPHOCYTES # BLD AUTO: 1.14 10*3/MM3 (ref 0.9–4.8)
LYMPHOCYTES NFR BLD AUTO: 20.5 % (ref 19.6–45.3)
MCH RBC QN AUTO: 27.7 PG (ref 26.9–32)
MCHC RBC AUTO-ENTMCNC: 33.6 G/DL (ref 32.4–36.3)
MCV RBC AUTO: 82.3 FL (ref 80.5–98.2)
MONOCYTES # BLD AUTO: 0.58 10*3/MM3 (ref 0.2–1.2)
MONOCYTES NFR BLD AUTO: 10.5 % (ref 5–12)
NEUTROPHILS # BLD AUTO: 3.59 10*3/MM3 (ref 1.9–8.1)
NEUTROPHILS NFR BLD AUTO: 64.6 % (ref 42.7–76)
PLATELET # BLD AUTO: 210 10*3/MM3 (ref 140–500)
PMV BLD AUTO: 11.9 FL (ref 6–12)
POTASSIUM BLD-SCNC: 3.9 MMOL/L (ref 3.5–5.2)
PROT SERPL-MCNC: 7.9 G/DL (ref 6–8.5)
PROTHROMBIN TIME: 13.7 SECONDS (ref 11.7–14.2)
RBC # BLD AUTO: 5.42 10*6/MM3 (ref 3.9–5.2)
RH BLD: POSITIVE
SODIUM BLD-SCNC: 140 MMOL/L (ref 136–145)
T&S EXPIRATION DATE: NORMAL
WBC NRBC COR # BLD: 5.55 10*3/MM3 (ref 4.5–10.7)
WHOLE BLOOD HOLD SPECIMEN: NORMAL
WHOLE BLOOD HOLD SPECIMEN: NORMAL

## 2018-11-07 PROCEDURE — 99284 EMERGENCY DEPT VISIT MOD MDM: CPT

## 2018-11-07 PROCEDURE — 73502 X-RAY EXAM HIP UNI 2-3 VIEWS: CPT

## 2018-11-07 PROCEDURE — 86900 BLOOD TYPING SEROLOGIC ABO: CPT | Performed by: EMERGENCY MEDICINE

## 2018-11-07 PROCEDURE — 71045 X-RAY EXAM CHEST 1 VIEW: CPT

## 2018-11-07 PROCEDURE — 93010 ELECTROCARDIOGRAM REPORT: CPT | Performed by: INTERNAL MEDICINE

## 2018-11-07 PROCEDURE — 85610 PROTHROMBIN TIME: CPT | Performed by: EMERGENCY MEDICINE

## 2018-11-07 PROCEDURE — 86850 RBC ANTIBODY SCREEN: CPT | Performed by: EMERGENCY MEDICINE

## 2018-11-07 PROCEDURE — 25010000002 HYDROMORPHONE PER 4 MG: Performed by: INTERNAL MEDICINE

## 2018-11-07 PROCEDURE — 85025 COMPLETE CBC W/AUTO DIFF WBC: CPT | Performed by: EMERGENCY MEDICINE

## 2018-11-07 PROCEDURE — 80053 COMPREHEN METABOLIC PANEL: CPT | Performed by: EMERGENCY MEDICINE

## 2018-11-07 PROCEDURE — 25010000002 ONDANSETRON PER 1 MG: Performed by: EMERGENCY MEDICINE

## 2018-11-07 PROCEDURE — 73552 X-RAY EXAM OF FEMUR 2/>: CPT

## 2018-11-07 PROCEDURE — 86901 BLOOD TYPING SEROLOGIC RH(D): CPT | Performed by: EMERGENCY MEDICINE

## 2018-11-07 PROCEDURE — 25010000002 HYDROMORPHONE PER 4 MG: Performed by: EMERGENCY MEDICINE

## 2018-11-07 PROCEDURE — 93005 ELECTROCARDIOGRAM TRACING: CPT | Performed by: INTERNAL MEDICINE

## 2018-11-07 RX ORDER — NALOXONE HCL 0.4 MG/ML
0.4 VIAL (ML) INJECTION
Status: DISCONTINUED | OUTPATIENT
Start: 2018-11-07 | End: 2018-11-07

## 2018-11-07 RX ORDER — ACETAMINOPHEN 325 MG/1
650 TABLET ORAL EVERY 4 HOURS PRN
Status: DISCONTINUED | OUTPATIENT
Start: 2018-11-07 | End: 2018-11-10 | Stop reason: HOSPADM

## 2018-11-07 RX ORDER — CEFDINIR 300 MG/1
300 CAPSULE ORAL 2 TIMES DAILY
COMMUNITY
End: 2018-11-10 | Stop reason: HOSPADM

## 2018-11-07 RX ORDER — PANTOPRAZOLE SODIUM 40 MG/1
40 TABLET, DELAYED RELEASE ORAL DAILY
Status: DISCONTINUED | OUTPATIENT
Start: 2018-11-07 | End: 2018-11-10 | Stop reason: HOSPADM

## 2018-11-07 RX ORDER — ATENOLOL 25 MG/1
25 TABLET ORAL DAILY
Status: DISCONTINUED | OUTPATIENT
Start: 2018-11-07 | End: 2018-11-10 | Stop reason: HOSPADM

## 2018-11-07 RX ORDER — CYCLOBENZAPRINE HCL 10 MG
5 TABLET ORAL 3 TIMES DAILY PRN
Status: DISCONTINUED | OUTPATIENT
Start: 2018-11-07 | End: 2018-11-10 | Stop reason: HOSPADM

## 2018-11-07 RX ORDER — ONDANSETRON 2 MG/ML
4 INJECTION INTRAMUSCULAR; INTRAVENOUS ONCE
Status: COMPLETED | OUTPATIENT
Start: 2018-11-07 | End: 2018-11-07

## 2018-11-07 RX ORDER — SODIUM CHLORIDE 0.9 % (FLUSH) 0.9 %
3 SYRINGE (ML) INJECTION EVERY 12 HOURS SCHEDULED
Status: DISCONTINUED | OUTPATIENT
Start: 2018-11-07 | End: 2018-11-10 | Stop reason: HOSPADM

## 2018-11-07 RX ORDER — BUSPIRONE HYDROCHLORIDE 5 MG/1
5 TABLET ORAL 3 TIMES DAILY PRN
Status: DISCONTINUED | OUTPATIENT
Start: 2018-11-07 | End: 2018-11-10 | Stop reason: HOSPADM

## 2018-11-07 RX ORDER — SODIUM CHLORIDE 0.9 % (FLUSH) 0.9 %
10 SYRINGE (ML) INJECTION AS NEEDED
Status: DISCONTINUED | OUTPATIENT
Start: 2018-11-07 | End: 2018-11-10 | Stop reason: HOSPADM

## 2018-11-07 RX ORDER — SODIUM CHLORIDE 0.9 % (FLUSH) 0.9 %
3-10 SYRINGE (ML) INJECTION AS NEEDED
Status: DISCONTINUED | OUTPATIENT
Start: 2018-11-07 | End: 2018-11-10 | Stop reason: HOSPADM

## 2018-11-07 RX ORDER — HYDROMORPHONE HYDROCHLORIDE 1 MG/ML
0.5 INJECTION, SOLUTION INTRAMUSCULAR; INTRAVENOUS; SUBCUTANEOUS ONCE
Status: COMPLETED | OUTPATIENT
Start: 2018-11-07 | End: 2018-11-07

## 2018-11-07 RX ORDER — HYDROMORPHONE HYDROCHLORIDE 1 MG/ML
0.25 INJECTION, SOLUTION INTRAMUSCULAR; INTRAVENOUS; SUBCUTANEOUS
Status: DISCONTINUED | OUTPATIENT
Start: 2018-11-07 | End: 2018-11-10 | Stop reason: HOSPADM

## 2018-11-07 RX ORDER — MORPHINE SULFATE 2 MG/ML
2 INJECTION, SOLUTION INTRAMUSCULAR; INTRAVENOUS
Status: DISCONTINUED | OUTPATIENT
Start: 2018-11-07 | End: 2018-11-07

## 2018-11-07 RX ADMIN — HYDROMORPHONE HYDROCHLORIDE 0.25 MG: 1 INJECTION, SOLUTION INTRAMUSCULAR; INTRAVENOUS; SUBCUTANEOUS at 13:28

## 2018-11-07 RX ADMIN — HYDROMORPHONE HYDROCHLORIDE 0.25 MG: 1 INJECTION, SOLUTION INTRAMUSCULAR; INTRAVENOUS; SUBCUTANEOUS at 16:16

## 2018-11-07 RX ADMIN — CYCLOBENZAPRINE 5 MG: 10 TABLET, FILM COATED ORAL at 13:31

## 2018-11-07 RX ADMIN — HYDROMORPHONE HYDROCHLORIDE 0.5 MG: 1 INJECTION, SOLUTION INTRAMUSCULAR; INTRAVENOUS; SUBCUTANEOUS at 08:54

## 2018-11-07 RX ADMIN — HYDROMORPHONE HYDROCHLORIDE 0.5 MG: 1 INJECTION, SOLUTION INTRAMUSCULAR; INTRAVENOUS; SUBCUTANEOUS at 07:49

## 2018-11-07 RX ADMIN — HYDROMORPHONE HYDROCHLORIDE 0.25 MG: 1 INJECTION, SOLUTION INTRAMUSCULAR; INTRAVENOUS; SUBCUTANEOUS at 20:34

## 2018-11-07 RX ADMIN — Medication 3 ML: at 16:18

## 2018-11-07 RX ADMIN — Medication 3 ML: at 20:34

## 2018-11-07 RX ADMIN — HYDROMORPHONE HYDROCHLORIDE 0.25 MG: 1 INJECTION, SOLUTION INTRAMUSCULAR; INTRAVENOUS; SUBCUTANEOUS at 18:32

## 2018-11-07 RX ADMIN — CYCLOBENZAPRINE 5 MG: 10 TABLET, FILM COATED ORAL at 21:48

## 2018-11-07 RX ADMIN — HYDROMORPHONE HYDROCHLORIDE 0.25 MG: 1 INJECTION, SOLUTION INTRAMUSCULAR; INTRAVENOUS; SUBCUTANEOUS at 23:11

## 2018-11-07 RX ADMIN — ONDANSETRON 4 MG: 2 INJECTION INTRAMUSCULAR; INTRAVENOUS at 07:49

## 2018-11-07 NOTE — PLAN OF CARE
Problem: Patient Care Overview  Goal: Plan of Care Review  Outcome: Ongoing (interventions implemented as appropriate)   11/07/18 5124   Coping/Psychosocial   Plan of Care Reviewed With patient   Plan of Care Review   Progress no change   OTHER   Outcome Summary pt admitted to unit from ER. Fall at home left femur fracture. a/o x4. pure wick in place. pain controlled with IV pain medication every 2 hrs. VSS. NVI. surgery for 1pm tomorrow. NPO after MN. educated on the importance of BP monitoring and medications as ordered for HO HTN. verbalized understanding. will cont to monitor.      Goal: Individualization and Mutuality  Outcome: Ongoing (interventions implemented as appropriate)    Goal: Discharge Needs Assessment  Outcome: Ongoing (interventions implemented as appropriate)    Goal: Interprofessional Rounds/Family Conf  Outcome: Ongoing (interventions implemented as appropriate)      Problem: Fall Risk (Adult)  Goal: Identify Related Risk Factors and Signs and Symptoms  Outcome: Ongoing (interventions implemented as appropriate)    Goal: Absence of Fall  Outcome: Ongoing (interventions implemented as appropriate)      Problem: Skin Injury Risk (Adult)  Goal: Identify Related Risk Factors and Signs and Symptoms  Outcome: Ongoing (interventions implemented as appropriate)    Goal: Skin Health and Integrity  Outcome: Ongoing (interventions implemented as appropriate)      Problem: Fracture Orthopaedic (Adult)  Goal: Signs and Symptoms of Listed Potential Problems Will be Absent, Minimized or Managed (Fracture Orthopaedic)  Outcome: Ongoing (interventions implemented as appropriate)

## 2018-11-07 NOTE — ED NOTES
Patient moved to room 6 to wait on ortho Dr and room in the [facility]     Dara Hassan  11/07/18 0958

## 2018-11-07 NOTE — H&P
"[facility] Admission H&P    Patient Care Team:  Cory Mancilla MD as PCP - General (Family Medicine)  Neftali Staples MD as Consulting Physician (Rheumatology)  Hellen Wesley MD as Consulting Physician (Obstetrics and Gynecology)    Chief complaint: Left upper leg pain    History of Present Illness    This is a 58-year-old female with a history of breast cancer status post right mastectomy, hypertension, and rheumatoid arthritis checking the hands and feet along with osteopenia who is on bisphosphonate therapy.  She presented to the emergency room after she felt  \"popping and crunching\" in her left anterior upper leg associated with intense pain.  She states she has been having ongoing pain in the inguinal fold and left upper thigh for the past several months which has caused her to have a limp.  Her new symptoms today began while she was walking down a flight of steps.  She had no fall associated with this.  She currently is having pain in the left anterior upper thigh with muscle spasms.  She denies any symptoms of shortness of breath, chest pain.  She states that she did go to an urgent care clinic couple of days ago for sinus pain and was given Omnicef.  She feels better now from this standpoint.  In the emergency room x-ray imaging revealed a left femoral shaft fracture.    Past Medical History:   Diagnosis Date   • Allergic     takes 2 allergy injections weekly   • Breast cancer (CMS/HCC)     right side- had a mastectomy   • Cancer (CMS/HCC)    • GERD (gastroesophageal reflux disease)    • Hypertension    • Reflux gastritis    • Rheumatoid arthritis (CMS/HCC)      Past Surgical History:   Procedure Laterality Date   • BREAST SURGERY     • BUNIONECTOMY     • COLONOSCOPY     • DILATATION AND CURETTAGE     • HYSTERECTOMY     • MASTECTOMY Right      Family History   Problem Relation Age of Onset   • Hypertension Mother    • Hypertension Father    • Heart attack Maternal Grandfather    • Breast cancer " Neg Hx    • Colon cancer Neg Hx      Social History   Substance Use Topics   • Smoking status: Never Smoker   • Smokeless tobacco: Never Used   • Alcohol use No     Prescriptions Prior to Admission   Medication Sig Dispense Refill Last Dose   • atenolol (TENORMIN) 25 MG tablet Take 1 tablet by mouth Daily. 90 tablet 3 11/7/2018 at Unknown time   • busPIRone (BUSPAR) 5 MG tablet Take 1 tablet by mouth 3 (Three) Times a Day As Needed (anxiety). 20 tablet 1 Past Week at Unknown time   • calcium carbonate-vitamin d (CALTRATE 600+D) 600-400 MG-UNIT per tablet One tab po BID for osteopenia   11/7/2018 at Unknown time   • cefdinir (OMNICEF) 300 MG capsule Take 300 mg by mouth 2 (Two) Times a Day.   11/7/2018 at Unknown time   • ibandronate (BONIVA) 150 MG tablet Take 150 mg by mouth Every 30 (Thirty) Days.   11/1/18   • InFLIXimab (REMICADE IV) Infuse  into a venous catheter.   10/31/18   • leflunomide (ARAVA) 20 MG tablet Take 20 mg by mouth Daily.   11/7/2018 at Unknown time   • montelukast (SINGULAIR) 10 MG tablet    11/7/2018 at Unknown time   • Multiple Vitamin (MULTIVITAMIN) capsule Take 1 capsule by mouth.   11/7/2018 at Unknown time   • pantoprazole (PROTONIX) 40 MG EC tablet Take 1 tablet by mouth Daily. 90 tablet 3 Past Week at Unknown time   • fluconazole (DIFLUCAN) 150 MG tablet 1 tab by mouth for vaginitis and may repeat if needed after 72 hours 2 tablet 0 Unknown at Unknown time     Allergies:  Sulfa antibiotics    Review of Systems   Constitutional: Negative for chills and fever.   HENT: Negative for congestion and sore throat.    Eyes: Negative for visual disturbance.   Respiratory: Negative for cough, chest tightness, shortness of breath and wheezing.    Cardiovascular: Negative for chest pain, palpitations and leg swelling.   Gastrointestinal: Negative for abdominal distention, abdominal pain, diarrhea, nausea and vomiting.   Endocrine: Negative for polydipsia and polyuria.   Genitourinary: Negative for  difficulty urinating, dysuria, frequency and urgency.   Musculoskeletal: Positive for myalgias. Negative for arthralgias.        Left upper anterior thigh pain with associated muscle spasms   Skin: Negative for color change and rash.   Neurological: Negative for dizziness and light-headedness.        PHYSICAL EXAM    Vital Signs  tMax 24 hrs:  Temp (24hrs), Av.1 °F (37.3 °C), Min:99 °F (37.2 °C), Max:99.1 °F (37.3 °C)    Vitals Ranges:  Temp:  [99 °F (37.2 °C)-99.1 °F (37.3 °C)] 99 °F (37.2 °C)  Heart Rate:  [74-90] 74  Resp:  [16] 16  BP: (131-139)/(80-85) 131/85    Physical Exam   Constitutional: She is oriented to person, place, and time. She appears well-developed and well-nourished.   HENT:   Head: Normocephalic and atraumatic.   Eyes: Pupils are equal, round, and reactive to light. EOM are normal.   Neck: Neck supple. No tracheal deviation present.   Cardiovascular: Normal rate and regular rhythm.  Exam reveals no gallop.    No murmur heard.  Pulmonary/Chest: Effort normal. No respiratory distress. She has no wheezes.   Abdominal: Soft. Bowel sounds are normal. She exhibits no distension. There is no tenderness.   Musculoskeletal: She exhibits tenderness. She exhibits no edema.   She has swelling of the left anterior thigh with associated pain to palpation.  She is intact neurovascularly in the distal left lower extremity.  She has contractures of the fingers bilaterally with ulnar deviation consistent with her rheumatoid arthritis   Neurological: She is alert and oriented to person, place, and time. No cranial nerve deficit.   Skin: Skin is warm and dry.   Nursing note and vitals reviewed.      Results Review:    Results from last 7 days  Lab Units 18  0738   WBC 10*3/mm3 5.55   HEMOGLOBIN g/dL 15.0   HEMATOCRIT % 44.6   PLATELETS 10*3/mm3 210       Results from last 7 days  Lab Units 18  0738   SODIUM mmol/L 140   POTASSIUM mmol/L 3.9   CHLORIDE mmol/L 101   CO2 mmol/L 26.0   BUN mg/dL 19    CREATININE mg/dL 0.85   CALCIUM mg/dL 10.2   BILIRUBIN mg/dL 0.3   ALK PHOS U/L 83   ALT (SGPT) U/L 33   AST (SGOT) U/L 35*   GLUCOSE mg/dL 117*     X-ray of the left hip and pelvis shows a proximal left femur fracture    Chest x-ray shows no acute infiltrate, consolidation, or effusion     I reviewed the patient's new clinical results.  I reviewed the patient's new imaging results and agree with the interpretation.        Active Hospital Problems    Diagnosis Date Noted   • **Closed displaced transverse fracture of shaft of left femur (CMS/[facility]) [S72.322A] 11/07/2018   • Long-term use of immunosuppressant medication [Z79.899] 08/20/2018   • Rheumatoid arthritis involving both hands with positive rheumatoid factor (CMS/[facility]) [M05.741, M05.742] 03/10/2017   • Essential hypertension [I10] 03/10/2017   • Osteopenia [M85.80] 03/10/2017      Resolved Hospital Problems    Diagnosis Date Noted Date Resolved   No resolved problems to display.       Assessment & Plan    The patient has been admitted and will undergo intramedullary nailing tomorrow.  Appreciate assistance from orthopedic surgery.  I will check preoperative EKG but she has no concerning cardiopulmonary signs or symptoms.  She states that prior to the issues with her left leg she was exercising without difficulty and without chest pain or shortness of breath.  We will discontinue her bisphosphonate which is likely the etiology of her femur fracture but agree with sending bone pathology to rule out malignancy given her history of breast cancer.  I will continue her atenolol perioperatively.  Blood pressure looks good at this time and we will monitor closely in the postoperative setting.  She will be made nothing by mouth after midnight with additional plans based on her clinical course.    I discussed the patients findings and my recommendations with patient, family and nursing staff    Dennis Carmen MD  11/07/18  2:20 PM

## 2018-11-07 NOTE — CONSULTS
ORTHOPAEDIC SURGERY CONSULT NOTE  HPI:  Patient is a 58 y.o. Not  or  female who presents with hip pain after a fall from standing.  They presented to the ER for further workup where a left Subtrochanteric Hip Fracture was found. I was consulted for further management.  She has a history of several weeks and maybe a couple months of left thigh pain.  She thought this was sciatica.  She has been taking a bisphosphonate for her osteoporosis prevention.  She was just walking down the steps when she felt a crunch in her left hip, and presented to the ER.  She does have a history of breast cancer.      Past Medical History:   Diagnosis Date   • Allergic     takes 2 allergy injections weekly   • Breast cancer (CMS/HCC)     right side- had a mastectomy   • Cancer (CMS/HCC)    • GERD (gastroesophageal reflux disease)    • Hypertension    • Reflux gastritis    • Rheumatoid arthritis (CMS/HCC)      Past Surgical History:   Procedure Laterality Date   • BREAST SURGERY     • BUNIONECTOMY     • COLONOSCOPY     • DILATATION AND CURETTAGE     • HYSTERECTOMY     • MASTECTOMY Right      Prior to Admission medications    Medication Sig Start Date End Date Taking? Authorizing Provider   atenolol (TENORMIN) 25 MG tablet Take 1 tablet by mouth Daily. 7/19/18  Yes Cory Mancilla MD   busPIRone (BUSPAR) 5 MG tablet Take 1 tablet by mouth 3 (Three) Times a Day As Needed (anxiety). 5/11/18  Yes Franky Ram MD   calcium carbonate-vitamin d (CALTRATE 600+D) 600-400 MG-UNIT per tablet One tab po BID for osteopenia 7/16/18  Yes Cory Mancilla MD   cefdinir (OMNICEF) 300 MG capsule Take 300 mg by mouth 2 (Two) Times a Day.   Yes Malcom Santiago MD   fluconazole (DIFLUCAN) 150 MG tablet 1 tab by mouth for vaginitis and may repeat if needed after 72 hours 8/20/18  Yes Cory Mancilla MD   ibandronate (BONIVA) 150 MG tablet Take 150 mg by mouth Every 30 (Thirty) Days.   Yes Malcom Santiago MD   InFLIXimab  "(REMICADE IV) Infuse  into a venous catheter.   Yes Provider, MD Malcom   leflunomide (ARAVA) 20 MG tablet Take 20 mg by mouth Daily.   Yes Provider, MD Malcom   montelukast (SINGULAIR) 10 MG tablet  2/18/17  Yes ProviderMalcom MD   Multiple Vitamin (MULTIVITAMIN) capsule Take 1 capsule by mouth.   Yes Provider, MD Malcom   pantoprazole (PROTONIX) 40 MG EC tablet Take 1 tablet by mouth Daily. 7/19/18  Yes Cory Mancilla MD   nitrofurantoin, macrocrystal-monohydrate, (MACROBID) 100 MG capsule One capsule po BID until completed for infection 8/20/18   Cory Mancilla MD   oxybutynin (DITROPAN) 5 MG tablet One tablet po TID for bladder spasms/discomfort 8/20/18   Cory Mancilla MD     Allergies   Allergen Reactions   • Sulfa Antibiotics        There is no immunization history on file for this patient.  Social History   Substance Use Topics   • Smoking status: Never Smoker   • Smokeless tobacco: Never Used   • Alcohol use No      History   Drug Use No     REVIEW OF SYSTEMS:  Head: negative for headache  Respiratory: negative for shortness of breath.   Cardiovascular: negative for chest pain.   Gastrointestinal: negative abdominal pain.   Neurological: negative for LOC  Psychiatric/Behavioral: negative for memory loss.   All other systems reviewed and are negative  VITALS: /80   Pulse 90   Temp 99.1 °F (37.3 °C) (Tympanic)   Resp 16   Ht 162.6 cm (64\")   Wt 66.7 kg (147 lb)   SpO2 95%   BMI 25.23 kg/m²  Body mass index is 25.23 kg/m².  EXAM:   CONSTITUTIONAL: A&Ox3, No acute distress  LUNGS: Equal chest rise, no shortness of air  CARDIOVASCULAR: palpable peripheral pulses  SKIN: no skin lesions in the area examined  LYMPH: no lymphadenopathy in the area examined  EXTREMITY: Left Lower Extremity   Tenderness to Palpation: Tenderness to palpation at the hip   Pulses:  Palpable DP/PT pulses   Sensation: Sensation intact to light touch to saphenous/sural/deep " peroneal/superficial peroneal/tibial nerves   Motor: 5 out of 5 EHL/FHL/TA/GS motor complexes   Range of Motion: Range of motion of hip deferred secondary to pain.  Positive pain with passive leg roll    DATA REVIEW:   Xr Femur 2 View Left    Result Date: 11/7/2018  Proximal left femur fracture.  LEFT FEMUR 2 VIEWS  FINDINGS:  No fractures or other acute abnormalities are seen in the mid to distal left femur. There is some tibiofemoral joint space narrowing.  AP CHEST  FINDINGS:  Heart size is within normal limits. Lungs appear free of acute infiltrates. Surgical clips overlie the left axilla. Bones and soft tissues are unremarkable.  IMPRESSION:  No acute process.        Xr Chest 1 View    Result Date: 11/7/2018  Proximal left femur fracture.  LEFT FEMUR 2 VIEWS  FINDINGS:  No fractures or other acute abnormalities are seen in the mid to distal left femur. There is some tibiofemoral joint space narrowing.  AP CHEST  FINDINGS:  Heart size is within normal limits. Lungs appear free of acute infiltrates. Surgical clips overlie the left axilla. Bones and soft tissues are unremarkable.  IMPRESSION:  No acute process.        Xr Hip With Or Without Pelvis 2 - 3 View Left    Result Date: 11/7/2018  Proximal left femur fracture.  LEFT FEMUR 2 VIEWS  FINDINGS:  No fractures or other acute abnormalities are seen in the mid to distal left femur. There is some tibiofemoral joint space narrowing.  AP CHEST  FINDINGS:  Heart size is within normal limits. Lungs appear free of acute infiltrates. Surgical clips overlie the left axilla. Bones and soft tissues are unremarkable.  IMPRESSION:  No acute process.        Labs:   Results for the past 24 hours:   Recent Results (from the past 24 hour(s))   Light Blue Top    Collection Time: 11/07/18  7:38 AM   Result Value Ref Range    Extra Tube hold for add-on    Green Top (Gel)    Collection Time: 11/07/18  7:38 AM   Result Value Ref Range    Extra Tube Hold for add-ons.    Lavender Top     Collection Time: 11/07/18  7:38 AM   Result Value Ref Range    Extra Tube hold for add-on    Gold Top - SST    Collection Time: 11/07/18  7:38 AM   Result Value Ref Range    Extra Tube Hold for add-ons.    Comprehensive Metabolic Panel    Collection Time: 11/07/18  7:38 AM   Result Value Ref Range    Glucose 117 (H) 65 - 99 mg/dL    BUN 19 6 - 20 mg/dL    Creatinine 0.85 0.57 - 1.00 mg/dL    Sodium 140 136 - 145 mmol/L    Potassium 3.9 3.5 - 5.2 mmol/L    Chloride 101 98 - 107 mmol/L    CO2 26.0 22.0 - 29.0 mmol/L    Calcium 10.2 8.6 - 10.5 mg/dL    Total Protein 7.9 6.0 - 8.5 g/dL    Albumin 4.70 3.50 - 5.20 g/dL    ALT (SGPT) 33 1 - 33 U/L    AST (SGOT) 35 (H) 1 - 32 U/L    Alkaline Phosphatase 83 39 - 117 U/L    Total Bilirubin 0.3 0.1 - 1.2 mg/dL    eGFR Non African Amer 69 >60 mL/min/1.73    Globulin 3.2 gm/dL    A/G Ratio 1.5 g/dL    BUN/Creatinine Ratio 22.4 7.0 - 25.0    Anion Gap 13.0 mmol/L   Protime-INR    Collection Time: 11/07/18  7:38 AM   Result Value Ref Range    Protime 13.7 11.7 - 14.2 Seconds    INR 1.07 0.90 - 1.10   CBC Auto Differential    Collection Time: 11/07/18  7:38 AM   Result Value Ref Range    WBC 5.55 4.50 - 10.70 10*3/mm3    RBC 5.42 (H) 3.90 - 5.20 10*6/mm3    Hemoglobin 15.0 11.9 - 15.5 g/dL    Hematocrit 44.6 35.6 - 45.5 %    MCV 82.3 80.5 - 98.2 fL    MCH 27.7 26.9 - 32.0 pg    MCHC 33.6 32.4 - 36.3 g/dL    RDW 13.2 (H) 11.7 - 13.0 %    RDW-SD 39.7 37.0 - 54.0 fl    MPV 11.9 6.0 - 12.0 fL    Platelets 210 140 - 500 10*3/mm3    Neutrophil % 64.6 42.7 - 76.0 %    Lymphocyte % 20.5 19.6 - 45.3 %    Monocyte % 10.5 5.0 - 12.0 %    Eosinophil % 4.0 0.3 - 6.2 %    Basophil % 0.4 0.0 - 1.5 %    Immature Grans % 0.2 0.0 - 0.5 %    Neutrophils, Absolute 3.59 1.90 - 8.10 10*3/mm3    Lymphocytes, Absolute 1.14 0.90 - 4.80 10*3/mm3    Monocytes, Absolute 0.58 0.20 - 1.20 10*3/mm3    Eosinophils, Absolute 0.22 0.00 - 0.70 10*3/mm3    Basophils, Absolute 0.02 0.00 - 0.20 10*3/mm3    Immature  Grans, Absolute 0.01 0.00 - 0.03 10*3/mm3             IMPRESSION:  Patient is a 58 y.o. Not  or  female with left Subtrochanteric Hip Fracture as a result of bisphosphonate treatment.  This is a pathological fracture from chronic use of her medicine.  PLAN:   - Admited to: Dennis Carmen MD  - Diet: NPO after midnight, Regular for Now  - Weight Bearing:Left Lower Extremity Non Weight Bearing  - Labs: None additional needed  - Imaging: None additional needed  - Surgery: Medullary nailing for pathological subtrochanteric fracture   - Bisphosphonate-related subtrochanteric fracture: This is a rare albeit well-known complication of bisphosphonate therapy.  Patients can get cortical beaking followed by pain followed by fracture if not treated.  I believe that her prodromal thigh pain was a sign of her impending fracture.  I feel is very unlikely that this is another process like metastatic breast cancer.  I see no x-ray findings disc yes this.  - Consent: The risks and benefits of operative versus nonoperative treatment were discussed.  The patient elected to undergo operative treatment of their injury.  The risks discussed included but were not limited to blood clots, MI, stroke, other medical complications, infection, damage to neurovascular structures,, malunion, nonunion,, hardware prominence,, loss of range of motion, stiffness,, need for further procedures, and and risk of anesthesia..  No guarantees were made   - Disposition: Plan for surgery tomorrow at 1 PM.  Nothing by mouth after midnight, type and cross artery ordered.  Appreciate medicine management.  I will send cortical reamings to pathology intraoperatively to ensure that it is normal bone and not metastatic disease.  Either way, this needs to be treated with an intramedullary nail.    Samy Altamirano II, MD  Orthopaedic Surgery  [facility]

## 2018-11-07 NOTE — ED PROVIDER NOTES
" EMERGENCY DEPARTMENT ENCOUNTER    CHIEF COMPLAINT  Chief Complaint: L hip pain  History given by: Pt  History limited by: Nothing  Room Number: 06/06  PMD: Cory Mancilla MD      HPI:  Pt is a 58 y.o. female who presents complaining of L hip and thigh pain for the last 2-3 weeks that has been worse this morning. Pt reports that over the last 2-3 weeks she has had pain to her L hip where she intermittently feels her L hip \"catch\" which onsets her pain. She reports she was walking down the steps when she felt \"things moved around in there\" which significantly worsened her pain and caused her to fall. She denies head injury, LOC, L knee pain, fever, or any other complaints at this time. She reports her sx are improved with rest. Pt reports she has been on abx recently for a sinus infection. She has a hx of rheumatoid arthritis.    Duration:  2-3 weeks, worse today  Onset: gradual  Timing: intermittent, constant today  Location: L hip and thigh  Radiation: none  Quality: pain  Intensity/Severity: moderate  Progression: worse today  Associated Symptoms: none stated  Aggravating Factors: movement  Alleviating Factors: rest  Previous Episodes: none stated  Treatment before arrival: Pt reports she has recently been on abx for a sinus infection    PAST MEDICAL HISTORY  Active Ambulatory Problems     Diagnosis Date Noted   • Rheumatoid arthritis involving both hands with positive rheumatoid factor (CMS/[facility]) 03/10/2017   • Gastroesophageal reflux disease 03/10/2017   • Essential hypertension 03/10/2017   • Osteopenia 03/10/2017   • History of lobular carcinoma of breast 03/10/2017   • Health care maintenance 03/10/2017   • Acute cystitis with hematuria 03/17/2017   • Vaginal yeast infection 08/07/2017   • Rash 05/11/2018   • Long-term use of immunosuppressant medication 08/20/2018     Resolved Ambulatory Problems     Diagnosis Date Noted   • No Resolved Ambulatory Problems     Past Medical History:   Diagnosis Date   • " Allergic    • Breast cancer (CMS/HCC)    • Cancer (CMS/HCC)    • GERD (gastroesophageal reflux disease)    • Hypertension    • Reflux gastritis    • Rheumatoid arthritis (CMS/HCC)        PAST SURGICAL HISTORY  Past Surgical History:   Procedure Laterality Date   • BREAST SURGERY     • BUNIONECTOMY     • COLONOSCOPY     • DILATATION AND CURETTAGE     • HYSTERECTOMY     • MASTECTOMY Right        FAMILY HISTORY  Family History   Problem Relation Age of Onset   • Hypertension Mother    • Hypertension Father    • Heart attack Maternal Grandfather    • Breast cancer Neg Hx    • Colon cancer Neg Hx        SOCIAL HISTORY  Social History     Social History   • Marital status:      Spouse name: N/A   • Number of children: N/A   • Years of education: N/A     Occupational History   • Not on file.     Social History Main Topics   • Smoking status: Never Smoker   • Smokeless tobacco: Never Used   • Alcohol use No   • Drug use: No   • Sexual activity: Defer     Other Topics Concern   • Not on file     Social History Narrative   • No narrative on file       ALLERGIES  Sulfa antibiotics    REVIEW OF SYSTEMS  Review of Systems   Constitutional: Negative for fever.   HENT: Negative for sore throat.    Eyes: Negative.    Respiratory: Negative for cough and shortness of breath.    Cardiovascular: Negative for chest pain.   Gastrointestinal: Negative for abdominal pain, diarrhea and vomiting.   Genitourinary: Negative for dysuria.   Musculoskeletal: Positive for arthralgias (L hip) and myalgias (L thigh). Negative for neck pain.   Skin: Negative for rash.   Allergic/Immunologic: Negative.    Neurological: Negative for weakness, numbness and headaches.   Hematological: Negative.    Psychiatric/Behavioral: Negative.    All other systems reviewed and are negative.      PHYSICAL EXAM  ED Triage Vitals [11/07/18 0723]   Temp Heart Rate Resp BP SpO2   99.1 °F (37.3 °C) 90 16 139/80 95 %      Temp src Heart Rate Source Patient Position  BP Location FiO2 (%)   Tympanic Monitor -- -- --       Physical Exam   Constitutional: She is oriented to person, place, and time. No distress.   HENT:   Head: Normocephalic and atraumatic.   Eyes: Pupils are equal, round, and reactive to light. EOM are normal.   Neck: Normal range of motion. Neck supple.   Cardiovascular: Normal rate, regular rhythm and normal heart sounds.    Pulmonary/Chest: Effort normal and breath sounds normal. No respiratory distress.   Abdominal: Soft. There is no tenderness. There is no rebound and no guarding.   Musculoskeletal: She exhibits no edema.        Left upper leg: She exhibits deformity (L upper thigh).   LLE is shortened and externally rotated.    Neurological: She is alert and oriented to person, place, and time. She has normal sensation and normal strength.   Skin: Skin is warm and dry. No rash noted.   Psychiatric: Mood and affect normal.   Nursing note and vitals reviewed.      LAB RESULTS  Lab Results (last 24 hours)     Procedure Component Value Units Date/Time    CBC & Differential [[id number]] Collected:  11/07/18 0738    Specimen:  Blood Updated:  11/07/18 0853    Narrative:       The following orders were created for panel order CBC & Differential.  Procedure                               Abnormality         Status                     ---------                               -----------         ------                     CBC Auto Differential[[id number]]        Abnormal            Final result                 Please view results for these tests on the individual orders.    Comprehensive Metabolic Panel [[id number]]  (Abnormal) Collected:  11/07/18 0738    Specimen:  Blood Updated:  11/07/18 0857     Glucose 117 (H) mg/dL      BUN 19 mg/dL      Creatinine 0.85 mg/dL      Sodium 140 mmol/L      Potassium 3.9 mmol/L      Chloride 101 mmol/L      CO2 26.0 mmol/L      Calcium 10.2 mg/dL      Total Protein 7.9 g/dL      Albumin 4.70 g/dL      ALT (SGPT) 33 U/L      AST (SGOT) 35  (H) U/L      Alkaline Phosphatase 83 U/L      Total Bilirubin 0.3 mg/dL      eGFR Non African Amer 69 mL/min/1.73      Globulin 3.2 gm/dL      A/G Ratio 1.5 g/dL      BUN/Creatinine Ratio 22.4     Anion Gap 13.0 mmol/L     Protime-INR [[id number]]  (Normal) Collected:  11/07/18 0738    Specimen:  Blood Updated:  11/07/18 0857     Protime 13.7 Seconds      INR 1.07    CBC Auto Differential [[id number]]  (Abnormal) Collected:  11/07/18 0738    Specimen:  Blood Updated:  11/07/18 0853     WBC 5.55 10*3/mm3      RBC 5.42 (H) 10*6/mm3      Hemoglobin 15.0 g/dL      Hematocrit 44.6 %      MCV 82.3 fL      MCH 27.7 pg      MCHC 33.6 g/dL      RDW 13.2 (H) %      RDW-SD 39.7 fl      MPV 11.9 fL      Platelets 210 10*3/mm3      Neutrophil % 64.6 %      Lymphocyte % 20.5 %      Monocyte % 10.5 %      Eosinophil % 4.0 %      Basophil % 0.4 %      Immature Grans % 0.2 %      Neutrophils, Absolute 3.59 10*3/mm3      Lymphocytes, Absolute 1.14 10*3/mm3      Monocytes, Absolute 0.58 10*3/mm3      Eosinophils, Absolute 0.22 10*3/mm3      Basophils, Absolute 0.02 10*3/mm3      Immature Grans, Absolute 0.01 10*3/mm3           I ordered the above labs and reviewed the results    RADIOLOGY  XR Femur 2 View Left   Preliminary Result   Proximal left femur fracture.       LEFT FEMUR 2 VIEWS       FINDINGS:  No fractures or other acute abnormalities are seen in the mid   to distal left femur. There is some tibiofemoral joint space narrowing.       AP CHEST       FINDINGS:  Heart size is within normal limits. Lungs appear free of   acute infiltrates. Surgical clips overlie the left axilla. Bones and   soft tissues are unremarkable.       IMPRESSION:  No acute process.                  XR Hip With or Without Pelvis 2 - 3 View Left   Preliminary Result   Proximal left femur fracture.       LEFT FEMUR 2 VIEWS       FINDINGS:  No fractures or other acute abnormalities are seen in the mid   to distal left femur. There is some tibiofemoral joint  space narrowing.       AP CHEST       FINDINGS:  Heart size is within normal limits. Lungs appear free of   acute infiltrates. Surgical clips overlie the left axilla. Bones and   soft tissues are unremarkable.       IMPRESSION:  No acute process.                  XR Chest 1 View   Preliminary Result   Proximal left femur fracture.       LEFT FEMUR 2 VIEWS       FINDINGS:  No fractures or other acute abnormalities are seen in the mid   to distal left femur. There is some tibiofemoral joint space narrowing.       AP CHEST       FINDINGS:  Heart size is within normal limits. Lungs appear free of   acute infiltrates. Surgical clips overlie the left axilla. Bones and   soft tissues are unremarkable.       IMPRESSION:  No acute process.                       I ordered the above noted radiological studies. Interpreted by radiologist. Reviewed by me in PACS.       PROCEDURES  Procedures      PROGRESS AND CONSULTS        0745 - XR L femur and L hip ordered for further evaluation. Dilaudid ordered for pain, and zofran ordered for nausea.     0830 - Pt has a femur fracture on XR. CXR and lab work ordered for further evaluation.     0847 - Call placed with ortho and [facility].     0848 - Rechecked pt. Pt reports her pain is starting to return. Informed pt of the results of her imaging studies which shows a proximal femur fracture. D/w pt the plan to discuss her case with ortho and the need for admission with medicine. Pt understands and agrees with plan. All questions answered.     0849 - Dilaudid ordered for pain.     0859 - Discussed pt care with Dr. Carmen ([facility]) who agrees to admit the pt.     0934 - Dr. Altamirano (Ortho) is at bedside.     1022 - Rechecked pt. Pt is resting comfortably in NAD. Dr. Altamirano has seen and evaluated the pt. Informed her of the discussion with Dr. Carmen and restated the plans for admission. All questions answered.     MEDICAL DECISION MAKING  Results were reviewed/discussed with the patient and they  were also made aware of online access. Pt also made aware that some labs, such as cultures, will not be resulted during ER visit and follow up with PMD is necessary.     MDM  Number of Diagnoses or Management Options  Closed displaced transverse fracture of shaft of left femur, initial encounter (CMS/[facility]):      Amount and/or Complexity of Data Reviewed  Clinical lab tests: ordered and reviewed (WBC - 5.55)  Tests in the radiology section of CPT®: ordered and reviewed (XR L hip - proximal left femur fracture)  Discuss the patient with other providers: yes (Dr. Carmen ([facility]))           DIAGNOSIS  Final diagnoses:   Closed displaced transverse fracture of shaft of left femur, initial encounter (CMS/[facility])       DISPOSITION  ADMISSION    Discussed treatment plan and reason for admission with pt/family and admitting physician.  Pt/family voiced understanding of the plan for admission for further testing/treatment as needed.     Latest Documented Vital Signs:  As of 9:34 AM  BP- 139/80 HR- 90 Temp- 99.1 °F (37.3 °C) (Tympanic) O2 sat- 95%    --  Documentation assistance provided by valerie Riley for Dr. Yu.  Information recorded by the scrjuwane was done at my direction and has been verified and validated by me.        Nicanor Riley  11/07/18 1028       Jose Yu MD  11/07/18 3100

## 2018-11-07 NOTE — ED TRIAGE NOTES
Pt to ED via EMS from home, states she was walking down stairs this morning, twisted her left hip and felt it pop out of place. Per EMS left leg rotated, stabilized hip on scene. Pt in NAD, skin p/w/d upon arrival to ED

## 2018-11-08 ENCOUNTER — ANESTHESIA EVENT (OUTPATIENT)
Dept: PERIOP | Facility: HOSPITAL | Age: 58
End: 2018-11-08

## 2018-11-08 ENCOUNTER — ANESTHESIA (OUTPATIENT)
Dept: PERIOP | Facility: HOSPITAL | Age: 58
End: 2018-11-08

## 2018-11-08 ENCOUNTER — APPOINTMENT (OUTPATIENT)
Dept: GENERAL RADIOLOGY | Facility: HOSPITAL | Age: 58
End: 2018-11-08

## 2018-11-08 LAB
ANION GAP SERPL CALCULATED.3IONS-SCNC: 12.9 MMOL/L
BASOPHILS # BLD AUTO: 0.03 10*3/MM3 (ref 0–0.2)
BASOPHILS NFR BLD AUTO: 0.4 % (ref 0–1.5)
BUN BLD-MCNC: 13 MG/DL (ref 6–20)
BUN/CREAT SERPL: 17.6 (ref 7–25)
CALCIUM SPEC-SCNC: 9.2 MG/DL (ref 8.6–10.5)
CHLORIDE SERPL-SCNC: 100 MMOL/L (ref 98–107)
CO2 SERPL-SCNC: 24.1 MMOL/L (ref 22–29)
CREAT BLD-MCNC: 0.74 MG/DL (ref 0.57–1)
DEPRECATED RDW RBC AUTO: 42.5 FL (ref 37–54)
EOSINOPHIL # BLD AUTO: 0.28 10*3/MM3 (ref 0–0.7)
EOSINOPHIL NFR BLD AUTO: 3.5 % (ref 0.3–6.2)
ERYTHROCYTE [DISTWIDTH] IN BLOOD BY AUTOMATED COUNT: 13.4 % (ref 11.7–13)
GFR SERPL CREATININE-BSD FRML MDRD: 81 ML/MIN/1.73
GLUCOSE BLD-MCNC: 117 MG/DL (ref 65–99)
GLUCOSE BLDC GLUCOMTR-MCNC: 174 MG/DL (ref 70–130)
HCT VFR BLD AUTO: 43.3 % (ref 35.6–45.5)
HGB BLD-MCNC: 13.8 G/DL (ref 11.9–15.5)
IMM GRANULOCYTES # BLD: 0 10*3/MM3 (ref 0–0.03)
IMM GRANULOCYTES NFR BLD: 0 % (ref 0–0.5)
LYMPHOCYTES # BLD AUTO: 1.76 10*3/MM3 (ref 0.9–4.8)
LYMPHOCYTES NFR BLD AUTO: 22.1 % (ref 19.6–45.3)
MCH RBC QN AUTO: 27.9 PG (ref 26.9–32)
MCHC RBC AUTO-ENTMCNC: 31.9 G/DL (ref 32.4–36.3)
MCV RBC AUTO: 87.7 FL (ref 80.5–98.2)
MONOCYTES # BLD AUTO: 1.28 10*3/MM3 (ref 0.2–1.2)
MONOCYTES NFR BLD AUTO: 16.1 % (ref 5–12)
NEUTROPHILS # BLD AUTO: 4.6 10*3/MM3 (ref 1.9–8.1)
NEUTROPHILS NFR BLD AUTO: 57.9 % (ref 42.7–76)
PLATELET # BLD AUTO: 183 10*3/MM3 (ref 140–500)
PMV BLD AUTO: 11.4 FL (ref 6–12)
POTASSIUM BLD-SCNC: 3.7 MMOL/L (ref 3.5–5.2)
RBC # BLD AUTO: 4.94 10*6/MM3 (ref 3.9–5.2)
SODIUM BLD-SCNC: 137 MMOL/L (ref 136–145)
WBC NRBC COR # BLD: 7.95 10*3/MM3 (ref 4.5–10.7)

## 2018-11-08 PROCEDURE — C1713 ANCHOR/SCREW BN/BN,TIS/BN: HCPCS | Performed by: ORTHOPAEDIC SURGERY

## 2018-11-08 PROCEDURE — 25010000002 DEXAMETHASONE PER 1 MG: Performed by: NURSE ANESTHETIST, CERTIFIED REGISTERED

## 2018-11-08 PROCEDURE — 0QS906Z REPOSITION LEFT FEMORAL SHAFT WITH INTRAMEDULLARY INTERNAL FIXATION DEVICE, OPEN APPROACH: ICD-10-PCS | Performed by: ORTHOPAEDIC SURGERY

## 2018-11-08 PROCEDURE — 25010000003 CEFAZOLIN IN DEXTROSE 2-4 GM/100ML-% SOLUTION: Performed by: ORTHOPAEDIC SURGERY

## 2018-11-08 PROCEDURE — 25010000002 HYDROMORPHONE PER 4 MG: Performed by: NURSE ANESTHETIST, CERTIFIED REGISTERED

## 2018-11-08 PROCEDURE — 25010000002 FENTANYL CITRATE (PF) 100 MCG/2ML SOLUTION: Performed by: NURSE ANESTHETIST, CERTIFIED REGISTERED

## 2018-11-08 PROCEDURE — 80048 BASIC METABOLIC PNL TOTAL CA: CPT | Performed by: INTERNAL MEDICINE

## 2018-11-08 PROCEDURE — 25010000002 MIDAZOLAM PER 1 MG: Performed by: ANESTHESIOLOGY

## 2018-11-08 PROCEDURE — 25010000002 ONDANSETRON PER 1 MG: Performed by: ORTHOPAEDIC SURGERY

## 2018-11-08 PROCEDURE — 72170 X-RAY EXAM OF PELVIS: CPT

## 2018-11-08 PROCEDURE — 76000 FLUOROSCOPY <1 HR PHYS/QHP: CPT

## 2018-11-08 PROCEDURE — C1769 GUIDE WIRE: HCPCS | Performed by: ORTHOPAEDIC SURGERY

## 2018-11-08 PROCEDURE — 25010000002 PROPOFOL 10 MG/ML EMULSION: Performed by: NURSE ANESTHETIST, CERTIFIED REGISTERED

## 2018-11-08 PROCEDURE — 73502 X-RAY EXAM HIP UNI 2-3 VIEWS: CPT

## 2018-11-08 PROCEDURE — 25010000002 FENTANYL CITRATE (PF) 100 MCG/2ML SOLUTION: Performed by: ANESTHESIOLOGY

## 2018-11-08 PROCEDURE — 73552 X-RAY EXAM OF FEMUR 2/>: CPT

## 2018-11-08 PROCEDURE — 85025 COMPLETE CBC W/AUTO DIFF WBC: CPT | Performed by: INTERNAL MEDICINE

## 2018-11-08 PROCEDURE — 25010000002 HYDROMORPHONE PER 4 MG: Performed by: INTERNAL MEDICINE

## 2018-11-08 PROCEDURE — 82962 GLUCOSE BLOOD TEST: CPT

## 2018-11-08 PROCEDURE — 25010000002 ONDANSETRON PER 1 MG: Performed by: NURSE ANESTHETIST, CERTIFIED REGISTERED

## 2018-11-08 DEVICE — TRIGEN INTERTAN 1.5 11.5MM X 34CM                                    130DEGREE LEFT
Type: IMPLANTABLE DEVICE | Site: FEMUR | Status: FUNCTIONAL
Brand: TRIGEN

## 2018-11-08 DEVICE — TRIGEN INTERTAN SUBTROCHANTERIC                                    LAG SCREW 11MM X 100MM
Type: IMPLANTABLE DEVICE | Site: FEMUR | Status: FUNCTIONAL
Brand: TRIGEN

## 2018-11-08 DEVICE — TRIGEN LOW PROFILE SCREW 5.0MM X 30MM
Type: IMPLANTABLE DEVICE | Site: FEMUR | Status: FUNCTIONAL
Brand: TRIGEN

## 2018-11-08 RX ORDER — ASPIRIN 325 MG
325 TABLET ORAL DAILY
Status: DISCONTINUED | OUTPATIENT
Start: 2018-11-09 | End: 2018-11-10 | Stop reason: HOSPADM

## 2018-11-08 RX ORDER — FENTANYL CITRATE 50 UG/ML
50 INJECTION, SOLUTION INTRAMUSCULAR; INTRAVENOUS
Status: DISCONTINUED | OUTPATIENT
Start: 2018-11-08 | End: 2018-11-08 | Stop reason: HOSPADM

## 2018-11-08 RX ORDER — EPHEDRINE SULFATE 50 MG/ML
5 INJECTION, SOLUTION INTRAVENOUS ONCE AS NEEDED
Status: DISCONTINUED | OUTPATIENT
Start: 2018-11-08 | End: 2018-11-08 | Stop reason: HOSPADM

## 2018-11-08 RX ORDER — HYDROCODONE BITARTRATE AND ACETAMINOPHEN 5; 325 MG/1; MG/1
1 TABLET ORAL EVERY 6 HOURS PRN
Status: DISCONTINUED | OUTPATIENT
Start: 2018-11-08 | End: 2018-11-09

## 2018-11-08 RX ORDER — DEXAMETHASONE SODIUM PHOSPHATE 10 MG/ML
INJECTION INTRAMUSCULAR; INTRAVENOUS AS NEEDED
Status: DISCONTINUED | OUTPATIENT
Start: 2018-11-08 | End: 2018-11-08 | Stop reason: SURG

## 2018-11-08 RX ORDER — FAMOTIDINE 10 MG/ML
20 INJECTION, SOLUTION INTRAVENOUS ONCE
Status: COMPLETED | OUTPATIENT
Start: 2018-11-08 | End: 2018-11-08

## 2018-11-08 RX ORDER — ONDANSETRON 2 MG/ML
INJECTION INTRAMUSCULAR; INTRAVENOUS AS NEEDED
Status: DISCONTINUED | OUTPATIENT
Start: 2018-11-08 | End: 2018-11-08 | Stop reason: SURG

## 2018-11-08 RX ORDER — BUPIVACAINE HYDROCHLORIDE AND EPINEPHRINE 5; 5 MG/ML; UG/ML
INJECTION, SOLUTION PERINEURAL AS NEEDED
Status: DISCONTINUED | OUTPATIENT
Start: 2018-11-08 | End: 2018-11-08 | Stop reason: HOSPADM

## 2018-11-08 RX ORDER — LIDOCAINE HYDROCHLORIDE 10 MG/ML
0.5 INJECTION, SOLUTION EPIDURAL; INFILTRATION; INTRACAUDAL; PERINEURAL ONCE AS NEEDED
Status: DISCONTINUED | OUTPATIENT
Start: 2018-11-08 | End: 2018-11-08 | Stop reason: HOSPADM

## 2018-11-08 RX ORDER — MIDAZOLAM HYDROCHLORIDE 1 MG/ML
1 INJECTION INTRAMUSCULAR; INTRAVENOUS
Status: DISCONTINUED | OUTPATIENT
Start: 2018-11-08 | End: 2018-11-08 | Stop reason: HOSPADM

## 2018-11-08 RX ORDER — CEFAZOLIN SODIUM 2 G/100ML
2 INJECTION, SOLUTION INTRAVENOUS EVERY 8 HOURS
Status: COMPLETED | OUTPATIENT
Start: 2018-11-09 | End: 2018-11-09

## 2018-11-08 RX ORDER — PROMETHAZINE HYDROCHLORIDE 25 MG/1
25 SUPPOSITORY RECTAL ONCE AS NEEDED
Status: DISCONTINUED | OUTPATIENT
Start: 2018-11-08 | End: 2018-11-08 | Stop reason: HOSPADM

## 2018-11-08 RX ORDER — HYDROMORPHONE HYDROCHLORIDE 1 MG/ML
0.5 INJECTION, SOLUTION INTRAMUSCULAR; INTRAVENOUS; SUBCUTANEOUS
Status: DISCONTINUED | OUTPATIENT
Start: 2018-11-08 | End: 2018-11-08 | Stop reason: HOSPADM

## 2018-11-08 RX ORDER — PROMETHAZINE HYDROCHLORIDE 25 MG/ML
12.5 INJECTION, SOLUTION INTRAMUSCULAR; INTRAVENOUS ONCE AS NEEDED
Status: DISCONTINUED | OUTPATIENT
Start: 2018-11-08 | End: 2018-11-08 | Stop reason: HOSPADM

## 2018-11-08 RX ORDER — SODIUM CHLORIDE, SODIUM LACTATE, POTASSIUM CHLORIDE, CALCIUM CHLORIDE 600; 310; 30; 20 MG/100ML; MG/100ML; MG/100ML; MG/100ML
9 INJECTION, SOLUTION INTRAVENOUS CONTINUOUS
Status: DISCONTINUED | OUTPATIENT
Start: 2018-11-08 | End: 2018-11-10 | Stop reason: HOSPADM

## 2018-11-08 RX ORDER — DIPHENHYDRAMINE HYDROCHLORIDE 50 MG/ML
12.5 INJECTION INTRAMUSCULAR; INTRAVENOUS
Status: DISCONTINUED | OUTPATIENT
Start: 2018-11-08 | End: 2018-11-08 | Stop reason: HOSPADM

## 2018-11-08 RX ORDER — ROCURONIUM BROMIDE 10 MG/ML
INJECTION, SOLUTION INTRAVENOUS AS NEEDED
Status: DISCONTINUED | OUTPATIENT
Start: 2018-11-08 | End: 2018-11-08 | Stop reason: SURG

## 2018-11-08 RX ORDER — TRANEXAMIC ACID 100 MG/ML
INJECTION, SOLUTION INTRAVENOUS AS NEEDED
Status: DISCONTINUED | OUTPATIENT
Start: 2018-11-08 | End: 2018-11-08 | Stop reason: SURG

## 2018-11-08 RX ORDER — SODIUM CHLORIDE 0.9 % (FLUSH) 0.9 %
1-10 SYRINGE (ML) INJECTION AS NEEDED
Status: DISCONTINUED | OUTPATIENT
Start: 2018-11-08 | End: 2018-11-08 | Stop reason: HOSPADM

## 2018-11-08 RX ORDER — HYDROCODONE BITARTRATE AND ACETAMINOPHEN 7.5; 325 MG/1; MG/1
1 TABLET ORAL ONCE AS NEEDED
Status: DISCONTINUED | OUTPATIENT
Start: 2018-11-08 | End: 2018-11-08 | Stop reason: HOSPADM

## 2018-11-08 RX ORDER — CEFAZOLIN SODIUM 2 G/100ML
2 INJECTION, SOLUTION INTRAVENOUS ONCE
Status: COMPLETED | OUTPATIENT
Start: 2018-11-08 | End: 2018-11-08

## 2018-11-08 RX ORDER — PROMETHAZINE HYDROCHLORIDE 25 MG/1
12.5 TABLET ORAL ONCE AS NEEDED
Status: DISCONTINUED | OUTPATIENT
Start: 2018-11-08 | End: 2018-11-08 | Stop reason: HOSPADM

## 2018-11-08 RX ORDER — LABETALOL HYDROCHLORIDE 5 MG/ML
5 INJECTION, SOLUTION INTRAVENOUS
Status: DISCONTINUED | OUTPATIENT
Start: 2018-11-08 | End: 2018-11-08 | Stop reason: HOSPADM

## 2018-11-08 RX ORDER — MIDAZOLAM HYDROCHLORIDE 1 MG/ML
2 INJECTION INTRAMUSCULAR; INTRAVENOUS
Status: DISCONTINUED | OUTPATIENT
Start: 2018-11-08 | End: 2018-11-08 | Stop reason: HOSPADM

## 2018-11-08 RX ORDER — PROMETHAZINE HYDROCHLORIDE 25 MG/1
25 TABLET ORAL ONCE AS NEEDED
Status: DISCONTINUED | OUTPATIENT
Start: 2018-11-08 | End: 2018-11-08 | Stop reason: HOSPADM

## 2018-11-08 RX ORDER — PROPOFOL 10 MG/ML
VIAL (ML) INTRAVENOUS AS NEEDED
Status: DISCONTINUED | OUTPATIENT
Start: 2018-11-08 | End: 2018-11-08 | Stop reason: SURG

## 2018-11-08 RX ORDER — OXYCODONE AND ACETAMINOPHEN 7.5; 325 MG/1; MG/1
1 TABLET ORAL ONCE AS NEEDED
Status: DISCONTINUED | OUTPATIENT
Start: 2018-11-08 | End: 2018-11-08 | Stop reason: HOSPADM

## 2018-11-08 RX ORDER — ONDANSETRON 2 MG/ML
4 INJECTION INTRAMUSCULAR; INTRAVENOUS ONCE AS NEEDED
Status: DISCONTINUED | OUTPATIENT
Start: 2018-11-08 | End: 2018-11-08 | Stop reason: HOSPADM

## 2018-11-08 RX ORDER — ONDANSETRON 2 MG/ML
4 INJECTION INTRAMUSCULAR; INTRAVENOUS EVERY 6 HOURS PRN
Status: DISCONTINUED | OUTPATIENT
Start: 2018-11-08 | End: 2018-11-10 | Stop reason: HOSPADM

## 2018-11-08 RX ORDER — MAGNESIUM HYDROXIDE 1200 MG/15ML
LIQUID ORAL AS NEEDED
Status: DISCONTINUED | OUTPATIENT
Start: 2018-11-08 | End: 2018-11-08 | Stop reason: HOSPADM

## 2018-11-08 RX ORDER — FLUMAZENIL 0.1 MG/ML
0.2 INJECTION INTRAVENOUS AS NEEDED
Status: DISCONTINUED | OUTPATIENT
Start: 2018-11-08 | End: 2018-11-08 | Stop reason: HOSPADM

## 2018-11-08 RX ORDER — FENTANYL CITRATE 50 UG/ML
INJECTION, SOLUTION INTRAMUSCULAR; INTRAVENOUS AS NEEDED
Status: DISCONTINUED | OUTPATIENT
Start: 2018-11-08 | End: 2018-11-08 | Stop reason: SURG

## 2018-11-08 RX ORDER — NALOXONE HCL 0.4 MG/ML
0.2 VIAL (ML) INJECTION AS NEEDED
Status: DISCONTINUED | OUTPATIENT
Start: 2018-11-08 | End: 2018-11-08 | Stop reason: HOSPADM

## 2018-11-08 RX ORDER — HYDROCODONE BITARTRATE AND ACETAMINOPHEN 5; 325 MG/1; MG/1
2 TABLET ORAL EVERY 6 HOURS PRN
Status: DISCONTINUED | OUTPATIENT
Start: 2018-11-08 | End: 2018-11-09

## 2018-11-08 RX ORDER — LIDOCAINE HYDROCHLORIDE 20 MG/ML
INJECTION, SOLUTION INFILTRATION; PERINEURAL AS NEEDED
Status: DISCONTINUED | OUTPATIENT
Start: 2018-11-08 | End: 2018-11-08 | Stop reason: SURG

## 2018-11-08 RX ADMIN — ROCURONIUM BROMIDE 30 MG: 10 INJECTION INTRAVENOUS at 16:12

## 2018-11-08 RX ADMIN — SUGAMMADEX 200 MG: 100 INJECTION, SOLUTION INTRAVENOUS at 17:18

## 2018-11-08 RX ADMIN — SODIUM CHLORIDE, POTASSIUM CHLORIDE, SODIUM LACTATE AND CALCIUM CHLORIDE 9 ML/HR: 600; 310; 30; 20 INJECTION, SOLUTION INTRAVENOUS at 14:37

## 2018-11-08 RX ADMIN — LIDOCAINE HYDROCHLORIDE 60 MG: 20 INJECTION, SOLUTION INFILTRATION; PERINEURAL at 16:12

## 2018-11-08 RX ADMIN — ONDANSETRON 4 MG: 2 INJECTION INTRAMUSCULAR; INTRAVENOUS at 17:25

## 2018-11-08 RX ADMIN — SODIUM CHLORIDE, POTASSIUM CHLORIDE, SODIUM LACTATE AND CALCIUM CHLORIDE 9 ML/HR: 600; 310; 30; 20 INJECTION, SOLUTION INTRAVENOUS at 20:56

## 2018-11-08 RX ADMIN — FENTANYL CITRATE 50 MCG: 50 INJECTION INTRAMUSCULAR; INTRAVENOUS at 15:25

## 2018-11-08 RX ADMIN — FENTANYL CITRATE 50 MCG: 50 INJECTION, SOLUTION INTRAMUSCULAR; INTRAVENOUS at 17:56

## 2018-11-08 RX ADMIN — Medication 3 ML: at 20:57

## 2018-11-08 RX ADMIN — CEFAZOLIN SODIUM 2 G: 2 INJECTION, SOLUTION INTRAVENOUS at 16:08

## 2018-11-08 RX ADMIN — ATENOLOL 25 MG: 25 TABLET ORAL at 08:36

## 2018-11-08 RX ADMIN — HYDROCODONE BITARTRATE AND ACETAMINOPHEN 2 TABLET: 5; 325 TABLET ORAL at 20:54

## 2018-11-08 RX ADMIN — DEXAMETHASONE SODIUM PHOSPHATE 6 MG: 10 INJECTION INTRAMUSCULAR; INTRAVENOUS at 16:30

## 2018-11-08 RX ADMIN — FENTANYL CITRATE 50 MCG: 50 INJECTION, SOLUTION INTRAMUSCULAR; INTRAVENOUS at 18:16

## 2018-11-08 RX ADMIN — HYDROMORPHONE HYDROCHLORIDE 0.25 MG: 1 INJECTION, SOLUTION INTRAMUSCULAR; INTRAVENOUS; SUBCUTANEOUS at 08:36

## 2018-11-08 RX ADMIN — ONDANSETRON 4 MG: 2 INJECTION INTRAMUSCULAR; INTRAVENOUS at 07:04

## 2018-11-08 RX ADMIN — HYDROMORPHONE HYDROCHLORIDE 0.25 MG: 1 INJECTION, SOLUTION INTRAMUSCULAR; INTRAVENOUS; SUBCUTANEOUS at 06:20

## 2018-11-08 RX ADMIN — PROPOFOL 180 MG: 10 INJECTION, EMULSION INTRAVENOUS at 16:12

## 2018-11-08 RX ADMIN — HYDROMORPHONE HYDROCHLORIDE 0.25 MG: 1 INJECTION, SOLUTION INTRAMUSCULAR; INTRAVENOUS; SUBCUTANEOUS at 10:44

## 2018-11-08 RX ADMIN — FENTANYL CITRATE 50 MCG: 50 INJECTION INTRAMUSCULAR; INTRAVENOUS at 14:38

## 2018-11-08 RX ADMIN — FENTANYL CITRATE 50 MCG: 50 INJECTION INTRAMUSCULAR; INTRAVENOUS at 16:11

## 2018-11-08 RX ADMIN — Medication 1 MG: at 15:25

## 2018-11-08 RX ADMIN — Medication 1 MG: at 14:39

## 2018-11-08 RX ADMIN — TRANEXAMIC ACID 1000 MG: 100 INJECTION, SOLUTION INTRAVENOUS at 16:36

## 2018-11-08 RX ADMIN — HYDROMORPHONE HYDROCHLORIDE 0.5 MG: 1 INJECTION, SOLUTION INTRAMUSCULAR; INTRAVENOUS; SUBCUTANEOUS at 18:36

## 2018-11-08 RX ADMIN — FAMOTIDINE 20 MG: 10 INJECTION, SOLUTION INTRAVENOUS at 14:37

## 2018-11-08 RX ADMIN — CEFAZOLIN SODIUM 2 G: 2 INJECTION, SOLUTION INTRAVENOUS at 23:46

## 2018-11-08 RX ADMIN — HYDROMORPHONE HYDROCHLORIDE 0.25 MG: 1 INJECTION, SOLUTION INTRAMUSCULAR; INTRAVENOUS; SUBCUTANEOUS at 13:21

## 2018-11-08 RX ADMIN — HYDROMORPHONE HYDROCHLORIDE 0.25 MG: 1 INJECTION, SOLUTION INTRAMUSCULAR; INTRAVENOUS; SUBCUTANEOUS at 01:50

## 2018-11-08 RX ADMIN — HYDROMORPHONE HYDROCHLORIDE 0.25 MG: 1 INJECTION, SOLUTION INTRAMUSCULAR; INTRAVENOUS; SUBCUTANEOUS at 04:09

## 2018-11-08 RX ADMIN — Medication 3 ML: at 08:30

## 2018-11-08 NOTE — ANESTHESIA PROCEDURE NOTES
Airway  Urgency: elective    Date/Time: 11/8/2018 4:18 PM  Airway not difficult    General Information and Staff    Patient location during procedure: OR  Anesthesiologist: MICHAEL RANGEL  CRNA: MAYRA HERNANDEZ    Indications and Patient Condition  Indications for airway management: airway protection    Preoxygenated: yes  Mask difficulty assessment: 1 - vent by mask    Final Airway Details  Final airway type: endotracheal airway      Successful airway: ETT  Cuffed: yes   Successful intubation technique: direct laryngoscopy  Facilitating devices/methods: intubating stylet  Endotracheal tube insertion site: oral  Blade: Carroll  Blade size: 3  ETT size: 7.0 mm  Cormack-Lehane Classification: grade IIa - partial view of glottis  Placement verified by: chest auscultation and capnometry   Measured from: lips  ETT to lips (cm): 21  Number of attempts at approach: 1    Additional Comments  Pre 02, sivi,, easy bvm, dlx1, grade 2a view, intubation x 1 attempt with eschman, +etc02, +bebs, appears atraumatic, teeth unchanged

## 2018-11-08 NOTE — OP NOTE
HIP FRACTURE OPERATIVE NOTE  PATIENT NAME: Bonnie Syed  MRN: [id number]  ATTENDING: Dennis Carmen*  : 1960 AGE: 58 y.o. GENDER: female  DATE OF OPERATION: 2018 - 2018  PREOPERATIVE DIAGNOSIS: Closed displaced transverse fracture of shaft of left femur, initial encounter (CMS/[facility]) [S72.322A]  POSTOPERATIVE DIAGNOSIS: Closed displaced transverse fracture of shaft of left femur, initial encounter (CMS/[facility]) [S72.322A]  OPERATION PERFORMED: Procedure(s):  FEMUR INTRAMEDULLARY NAILING/RODDING  SURGEON: Samy Altamirano MD  Circulator: Karen Ruiz RN; Keysha Reyna RN  Radiology Technologist: Ric Otero RRT  Scrub Person: Michelle Burroughs; Leanna Peters  Vendor Representative: Reji Snell  ANESTHESIA: gen  ESTIMATED BLOOD LOSS: 100mL  SPONGE AND NEEDLE COUNT: Correct  INDICATIONS: This patient was found to have an Subtrochanteric Hip Fracture hip after evaluation in the ER. An orthopaedic consult was placed for management. After a thorough medical workup and discussion of the surgical options, the patient was cleared and scheduled for ORIF of the operative hip.   COMPONENTS:   [employer] TriGen InterTAN nail: 11.5 mm x 34 cm, 130°   TriGen InterTAN lag screw: 100mm  TriGen L-P screw: 5 mm x 35 mm  PERTINENT FINDINGS: Hip fracture  DETAILS OF PROCEDURE:   The patient was met in the preoperative area. The site was marked. The consent and H&P were reviewed. The patient was then wheeled back to the operative suite underwent anesthesia. The Jackson table boots were secured to the patients’ feet using Coban for extra friction. The patient was moved onto the Jackson table and secured in the supine position. The perineal post was inserted and the boots were secured into the leg holders. Excess hair about the operative hip was removed using surgical clippers. The operative area was marked out using two 10x15 drapes to include below the knee to above the iliac crest. Surgical alcohol was  used to thoroughly clean the entire operative extremity.    Before prepping of the extremity, the fracture was closed reduced using traction and internal/external rotation of the extremity as needed. Visualization of adequate reduction was obtained with fluoroscopy for both the AP and Lateral views.     The hip and leg was then prepped in the normal sterile fashion, which included Chloroprep and multiple layers of sterile drapes. The surgical incision was marked. A surgical timeout was performed in which administration of preoperative antibiotics and the surgical site were confirmed.    A small incision was made just proximal to the greater trochanter and a starting pin was drilled into the tip of the greater trochanter using fluoroscopy to confirm proper location on both the AP and Lateral views. The opening reamer was then used to open the canal down to the lesser trochanter, visualizing propper position again in the AP and Lateral views.     LONG NAIL  Using the finger retractor, the proximal portion of the subtrochanteric piece was reduced.  The distal segment was manipulated using the [place] table leg thomas.  After both ends of the bone were lined up adequately, the guidewire was inserted.  A guide wire was inserted down the femoral canal and its position near the physical scar in the middle of the knee was verified using fluoroscopy. The length of the implant to be used was determined using the depth gauge to measure the length of the guide wire within the femur. Next reaming of the femoral canal was performed until adequate chatter was noted.  Although perfect reduction was not achieved, given the nature of the subtrochanteric fracture, I felt that open reduction would have a higher chance of infection and likely not benefit the surgery in any way.  I elected to continue with close management of the fracture.  A femoral nail 2mm smaller in diameter than the largest reamer was inserted. The lag screw and  compression screw were placed into the femoral head after being drilled and measured, again using fluoroscopy to place the screws in optimal position within the femoral head on both the AP and Lateral views, close to center/center position. Traction was released at this point and the fracture was compressed through the nail. Finally the long nail was secured distally with one interlocking screw placed using “perfect circles” fluoroscopy technique.     The wounds were irrigated and closed with 2-0 Vicryl and staples.  The wound was injected with Marcaine.  A sterile dressing was then placed over the incisions. The patient was moved from the [place] table to the [facility] where the boots were removed. The patient was taken to the recovery room in stable condition. There were no complications and the patient tolerated the procedure well.    Samy Altamirano II, MD  Orthopaedic Surgery  [facility]

## 2018-11-08 NOTE — PLAN OF CARE
Problem: Patient Care Overview  Goal: Plan of Care Review  Outcome: Outcome(s) achieved Date Met: 11/08/18 11/08/18 0154   Coping/Psychosocial   Plan of Care Reviewed With patient   Plan of Care Review   Progress no change   OTHER   Outcome Summary Pt admitted from ER after a fall at home. Pathological fracture of the left hip. Pt scheduled for an IM nailing of the rt hip at 1p. Pt continues with IV Dilaudid for pain Q2hrs. Continues with the Pure WIck. Pt is not to have any BPs or sticks to the left arm related to history of a mastectomy. Pt educated on the importance of monitoring blood pressures related to comorbidity of HTN. Pt voiced understanding. Pt is resting at this time,will continue to monitor.     Goal: Individualization and Mutuality  Outcome: Ongoing (interventions implemented as appropriate)    Goal: Discharge Needs Assessment  Outcome: Ongoing (interventions implemented as appropriate)    Goal: Interprofessional Rounds/Family Conf  Outcome: Ongoing (interventions implemented as appropriate)      Problem: Fall Risk (Adult)  Goal: Identify Related Risk Factors and Signs and Symptoms  Outcome: Ongoing (interventions implemented as appropriate)    Goal: Absence of Fall  Outcome: Ongoing (interventions implemented as appropriate)      Problem: Skin Injury Risk (Adult)  Goal: Identify Related Risk Factors and Signs and Symptoms  Outcome: Ongoing (interventions implemented as appropriate)    Goal: Skin Health and Integrity  Outcome: Ongoing (interventions implemented as appropriate)      Problem: Fracture Orthopaedic (Adult)  Goal: Signs and Symptoms of Listed Potential Problems Will be Absent, Minimized or Managed (Fracture Orthopaedic)  Outcome: Ongoing (interventions implemented as appropriate)

## 2018-11-08 NOTE — ANESTHESIA PREPROCEDURE EVALUATION
Anesthesia Evaluation     Patient summary reviewed and Nursing notes reviewed   NPO Solid Status: > 8 hours  NPO Liquid Status: > 8 hours           Airway   Mallampati: II  Neck ROM: full  No difficulty expected  Dental - normal exam     Pulmonary     breath sounds clear to auscultation  Cardiovascular     Rhythm: regular    (+) hypertension,       Neuro/Psych  GI/Hepatic/Renal/Endo    (+)  GERD,      Musculoskeletal     Abdominal    Substance History      OB/GYN          Other   (+) arthritis (rheumatoid)   history of cancer                    Anesthesia Plan    ASA 3     general     intravenous induction   Anesthetic plan, all risks, benefits, and alternatives have been provided, discussed and informed consent has been obtained with: patient.

## 2018-11-08 NOTE — PROGRESS NOTES
" LOS: 1 day     Name: Bonnie Syed  Age: 58 y.o.  Sex: female  :  1960  MRN: [id number]         Primary Care Physician: Cory Mancilla MD    Subjective   Subjective  Left upper leg pain is reasonably controlled with pain medicine and muscle relaxers.  States she had a migraine this morning that is now improving.  Some associated nausea but this is also improved.  Awaiting surgery today at 1:00.    Objective   Vital Signs  Temp:  [98.6 °F (37 °C)-99 °F (37.2 °C)] 98.6 °F (37 °C)  Heart Rate:  [] 98  Resp:  [16] 16  BP: (117-132)/(77-85) 120/77  Body mass index is 23.17 kg/m².    Objective:  General Appearance:  Comfortable and in no acute distress.    Vital signs: (most recent): Blood pressure 120/77, pulse 98, temperature 98.6 °F (37 °C), temperature source Oral, resp. rate 16, height 162.6 cm (64\"), weight 61.2 kg (135 lb), SpO2 93 %.    Lungs:  Normal effort and normal respiratory rate.    Heart: Normal rate.  Regular rhythm.    Abdomen: Abdomen is soft.  Bowel sounds are normal.   There is no abdominal tenderness.     Extremities: There is no dependent edema or local swelling.    Neurological: Patient is alert and oriented to person, place and time.    Skin:  Warm and dry.              Results Review:       I reviewed the patient's new clinical results.      Results from last 7 days  Lab Units 18  0421 18  0738   WBC 10*3/mm3 7.95 5.55   HEMOGLOBIN g/dL 13.8 15.0   PLATELETS 10*3/mm3 183 210       Results from last 7 days  Lab Units 18  0421 18  0738   SODIUM mmol/L 137 140   POTASSIUM mmol/L 3.7 3.9   CHLORIDE mmol/L 100 101   CO2 mmol/L 24.1 26.0   BUN mg/dL 13 19   CREATININE mg/dL 0.74 0.85   CALCIUM mg/dL 9.2 10.2   GLUCOSE mg/dL 117* 117*       Results from last 7 days  Lab Units 18  0738   INR  1.07       Scheduled Meds:     atenolol 25 mg Oral Daily   pantoprazole 40 mg Oral Daily   sodium chloride 3 mL Intravenous Q12H     PRN Meds:   •  acetaminophen  •  " busPIRone  •  cyclobenzaprine  •  HYDROmorphone  •  ondansetron  •  [COMPLETED] Insert peripheral IV **AND** sodium chloride  •  sodium chloride  Continuous Infusions:       Assessment/Plan   Active Hospital Problems    Diagnosis Date Noted   • **Closed displaced transverse fracture of shaft of left femur (CMS/[facility]) [S72.322A] 11/07/2018   • Long-term use of immunosuppressant medication [Z79.899] 08/20/2018   • Rheumatoid arthritis involving both hands with positive rheumatoid factor (CMS/[facility]) [M05.741, M05.742] 03/10/2017   • Essential hypertension [I10] 03/10/2017   • Osteopenia [M85.80] 03/10/2017      Resolved Hospital Problems    Diagnosis Date Noted Date Resolved   No resolved problems to display.       Assessment & Plan    - Plans for surgery today 1:00 noted.  - Preoperative EKG shows sinus rhythm.  No complaints of chest pain or shortness of breath.  She can undergo surgery today with low risk.  - Continue perioperative atenolol.  Blood pressure stable at this time.  - Physical therapy postoperatively    eDnnis Carmen MD  [facility]  11/08/18  9:56 AM

## 2018-11-09 LAB
ANION GAP SERPL CALCULATED.3IONS-SCNC: 13.7 MMOL/L
BASOPHILS # BLD AUTO: 0.01 10*3/MM3 (ref 0–0.2)
BASOPHILS NFR BLD AUTO: 0.1 % (ref 0–1.5)
BUN BLD-MCNC: 13 MG/DL (ref 6–20)
BUN/CREAT SERPL: 17.1 (ref 7–25)
CALCIUM SPEC-SCNC: 9 MG/DL (ref 8.6–10.5)
CHLORIDE SERPL-SCNC: 102 MMOL/L (ref 98–107)
CO2 SERPL-SCNC: 22.3 MMOL/L (ref 22–29)
CREAT BLD-MCNC: 0.76 MG/DL (ref 0.57–1)
DEPRECATED RDW RBC AUTO: 41.4 FL (ref 37–54)
EOSINOPHIL # BLD AUTO: 0.02 10*3/MM3 (ref 0–0.7)
EOSINOPHIL NFR BLD AUTO: 0.2 % (ref 0.3–6.2)
ERYTHROCYTE [DISTWIDTH] IN BLOOD BY AUTOMATED COUNT: 13.2 % (ref 11.7–13)
GFR SERPL CREATININE-BSD FRML MDRD: 78 ML/MIN/1.73
GLUCOSE BLD-MCNC: 125 MG/DL (ref 65–99)
HCT VFR BLD AUTO: 39.3 % (ref 35.6–45.5)
HGB BLD-MCNC: 12.5 G/DL (ref 11.9–15.5)
IMM GRANULOCYTES # BLD: 0.02 10*3/MM3 (ref 0–0.03)
IMM GRANULOCYTES NFR BLD: 0.2 % (ref 0–0.5)
LYMPHOCYTES # BLD AUTO: 1.59 10*3/MM3 (ref 0.9–4.8)
LYMPHOCYTES NFR BLD AUTO: 13.6 % (ref 19.6–45.3)
MCH RBC QN AUTO: 27.4 PG (ref 26.9–32)
MCHC RBC AUTO-ENTMCNC: 31.8 G/DL (ref 32.4–36.3)
MCV RBC AUTO: 86 FL (ref 80.5–98.2)
MONOCYTES # BLD AUTO: 1.49 10*3/MM3 (ref 0.2–1.2)
MONOCYTES NFR BLD AUTO: 12.8 % (ref 5–12)
NEUTROPHILS # BLD AUTO: 8.55 10*3/MM3 (ref 1.9–8.1)
NEUTROPHILS NFR BLD AUTO: 73.1 % (ref 42.7–76)
PLATELET # BLD AUTO: 205 10*3/MM3 (ref 140–500)
PMV BLD AUTO: 10.9 FL (ref 6–12)
POTASSIUM BLD-SCNC: 4.1 MMOL/L (ref 3.5–5.2)
RBC # BLD AUTO: 4.57 10*6/MM3 (ref 3.9–5.2)
SODIUM BLD-SCNC: 138 MMOL/L (ref 136–145)
WBC NRBC COR # BLD: 11.68 10*3/MM3 (ref 4.5–10.7)

## 2018-11-09 PROCEDURE — 97110 THERAPEUTIC EXERCISES: CPT

## 2018-11-09 PROCEDURE — 25010000003 CEFAZOLIN IN DEXTROSE 2-4 GM/100ML-% SOLUTION: Performed by: ORTHOPAEDIC SURGERY

## 2018-11-09 PROCEDURE — 97161 PT EVAL LOW COMPLEX 20 MIN: CPT

## 2018-11-09 PROCEDURE — 85025 COMPLETE CBC W/AUTO DIFF WBC: CPT | Performed by: INTERNAL MEDICINE

## 2018-11-09 PROCEDURE — 80048 BASIC METABOLIC PNL TOTAL CA: CPT | Performed by: INTERNAL MEDICINE

## 2018-11-09 RX ORDER — HYDROCODONE BITARTRATE AND ACETAMINOPHEN 5; 325 MG/1; MG/1
2 TABLET ORAL EVERY 4 HOURS PRN
Status: DISCONTINUED | OUTPATIENT
Start: 2018-11-09 | End: 2018-11-10 | Stop reason: HOSPADM

## 2018-11-09 RX ORDER — HYDROCODONE BITARTRATE AND ACETAMINOPHEN 5; 325 MG/1; MG/1
1 TABLET ORAL EVERY 4 HOURS PRN
Status: DISCONTINUED | OUTPATIENT
Start: 2018-11-09 | End: 2018-11-10 | Stop reason: HOSPADM

## 2018-11-09 RX ADMIN — CEFAZOLIN SODIUM 2 G: 2 INJECTION, SOLUTION INTRAVENOUS at 17:12

## 2018-11-09 RX ADMIN — Medication 3 ML: at 09:00

## 2018-11-09 RX ADMIN — CEFAZOLIN SODIUM 2 G: 2 INJECTION, SOLUTION INTRAVENOUS at 09:04

## 2018-11-09 RX ADMIN — ATENOLOL 25 MG: 25 TABLET ORAL at 09:04

## 2018-11-09 RX ADMIN — HYDROCODONE BITARTRATE AND ACETAMINOPHEN 2 TABLET: 5; 325 TABLET ORAL at 17:11

## 2018-11-09 RX ADMIN — HYDROCODONE BITARTRATE AND ACETAMINOPHEN 2 TABLET: 5; 325 TABLET ORAL at 03:08

## 2018-11-09 RX ADMIN — ASPIRIN 325 MG: 325 TABLET ORAL at 09:04

## 2018-11-09 RX ADMIN — POLYETHYLENE GLYCOL 3350 17 G: 17 POWDER, FOR SOLUTION ORAL at 09:04

## 2018-11-09 RX ADMIN — PANTOPRAZOLE SODIUM 40 MG: 40 TABLET, DELAYED RELEASE ORAL at 09:04

## 2018-11-09 RX ADMIN — HYDROCODONE BITARTRATE AND ACETAMINOPHEN 2 TABLET: 5; 325 TABLET ORAL at 13:10

## 2018-11-09 RX ADMIN — Medication 3 ML: at 21:03

## 2018-11-09 RX ADMIN — HYDROCODONE BITARTRATE AND ACETAMINOPHEN 2 TABLET: 5; 325 TABLET ORAL at 21:03

## 2018-11-09 RX ADMIN — HYDROCODONE BITARTRATE AND ACETAMINOPHEN 2 TABLET: 5; 325 TABLET ORAL at 09:03

## 2018-11-09 NOTE — PLAN OF CARE
Problem: Patient Care Overview  Goal: Plan of Care Review  Outcome: Ongoing (interventions implemented as appropriate)   11/08/18 1934   Coping/Psychosocial   Plan of Care Reviewed With patient   Plan of Care Review   Progress improving   OTHER   Outcome Summary S/P L IM nailing. Arrived to unit at 1850. VSS. Dressing c/d/i. DTV 0140. Still needs to get up to chair. Discussed BP med and monitoring r/t HTN.        Problem: Fall Risk (Adult)  Goal: Identify Related Risk Factors and Signs and Symptoms  Outcome: Ongoing (interventions implemented as appropriate)    Goal: Absence of Fall  Outcome: Ongoing (interventions implemented as appropriate)      Problem: Skin Injury Risk (Adult)  Goal: Identify Related Risk Factors and Signs and Symptoms  Outcome: Ongoing (interventions implemented as appropriate)    Goal: Skin Health and Integrity  Outcome: Ongoing (interventions implemented as appropriate)      Problem: Fracture Orthopaedic (Adult)  Goal: Signs and Symptoms of Listed Potential Problems Will be Absent, Minimized or Managed (Fracture Orthopaedic)  Outcome: Ongoing (interventions implemented as appropriate)

## 2018-11-09 NOTE — ANESTHESIA POSTPROCEDURE EVALUATION
"Patient: Bonnie Syed    Procedure Summary     Date:  11/08/18 Room / Location:  [facility] OR  / [facility] MAIN OR    Anesthesia Start:  1608 Anesthesia Stop:  1736    Procedure:  FEMUR INTRAMEDULLARY NAILING/RODDING (Left Thigh) Diagnosis:       Closed displaced transverse fracture of shaft of left femur, initial encounter (CMS/[facility])      (Closed displaced transverse fracture of shaft of left femur, initial encounter (CMS/[facility]) [S72.322A])    Surgeon:  Samy Altamirano II, MD Provider:  Saul Low MD    Anesthesia Type:  general ASA Status:  3          Anesthesia Type: general  Last vitals  BP   112/79 (11/08/18 1850)   Temp   37.5 °C (99.5 °F) (11/08/18 1850)   Pulse   83 (11/08/18 1850)   Resp   16 (11/08/18 1850)     SpO2   97 % (11/08/18 1850)     Post Anesthesia Care and Evaluation    Patient location during evaluation: bedside  Patient participation: complete - patient participated  Level of consciousness: awake and alert  Pain management: adequate  Airway patency: patent  Anesthetic complications: No anesthetic complications    Cardiovascular status: acceptable  Respiratory status: acceptable  Hydration status: acceptable    Comments: /79 (BP Location: Right arm, Patient Position: Lying)   Pulse 83   Temp 37.5 °C (99.5 °F) (Oral)   Resp 16   Ht 162.6 cm (64\")   Wt 61.2 kg (135 lb)   SpO2 97%   BMI 23.17 kg/m²       "

## 2018-11-09 NOTE — PLAN OF CARE
Problem: Patient Care Overview  Goal: Plan of Care Review  Outcome: Ongoing (interventions implemented as appropriate)   11/09/18 0529   Coping/Psychosocial   Plan of Care Reviewed With patient   Plan of Care Review   Progress improving   OTHER   Outcome Summary VSS, BSC, ambulating well, po pain meds effective, educated on monitoring blood pressure     Goal: Individualization and Mutuality  Outcome: Ongoing (interventions implemented as appropriate)    Goal: Discharge Needs Assessment  Outcome: Ongoing (interventions implemented as appropriate)      Problem: Fall Risk (Adult)  Goal: Identify Related Risk Factors and Signs and Symptoms  Outcome: Ongoing (interventions implemented as appropriate)    Goal: Absence of Fall  Outcome: Ongoing (interventions implemented as appropriate)      Problem: Skin Injury Risk (Adult)  Goal: Skin Health and Integrity  Outcome: Ongoing (interventions implemented as appropriate)

## 2018-11-09 NOTE — PLAN OF CARE
Problem: Patient Care Overview  Goal: Plan of Care Review   11/09/18 1034   Coping/Psychosocial   Plan of Care Reviewed With patient   OTHER   Outcome Summary Pt. will benefit from skilled inpt. P.T. to address her functional deficits and to assist pt. in regaining her maximum level of independence with functional mobility.

## 2018-11-09 NOTE — PROGRESS NOTES
ORTHOPAEDIC SURGERY DAILY PROGRESS NOTE  Patient is a 58 y.o. female who is 1 Day Post-Op s/p Procedure(s):  FEMUR INTRAMEDULLARY NAILING/RODDING     P884/1 Bonnie Syed Age:58 y.o. MRN:[id number]  Admitted: 11/7/2018  Overnight events: Stable since surgery.  No acute overnight events.  Pain: Pain well controlled with current pain regiment.   Ambulation/Activity: Minimal activity since yesterday.    Vitals:  Vitals:    11/08/18 1850 11/08/18 2101 11/08/18 2300 11/09/18 0350   BP: 112/79 123/74 126/79 139/72   BP Location: Right arm Right arm Right arm Right arm   Patient Position: Lying Lying Lying Lying   Pulse: 83 83 89 90   Resp: 16 16 16 16   Temp: 99.5 °F (37.5 °C)  98 °F (36.7 °C) 98 °F (36.7 °C)   TempSrc: Oral  Oral    SpO2: 97% 98% 97% 99%   Weight:       Height:         Ins/Outs:  Last 24hrs    Intake/Output Summary (Last 24 hours) at 11/09/18 0741  Last data filed at 11/09/18 0430   Gross per 24 hour   Intake              600 ml   Output              800 ml   Net             -200 ml     Physical Exam:  CONSTITUTIONAL: A&Ox3, No acute distress  LUNGS: Equal chest rise, no shortness of air  CARDIOVASCULAR: palpable peripheral pulses  WOUND: Dressings clean, dry, and intact  EXTREMITY: Left Lower Extremity   Pulses: brisk capillary refill intact   Sensation: Sensation intact to light touch to the saphenous/sural/tibial/deep peroneal/superficial peroneal nerves, and grossly throughout the extremity.   Motor: 5/5 EHL/FHL/TA/GS motor complexes    Labs:   Lab Results   Component Value Date    HGB 12.5 11/09/2018     Lab Results   Component Value Date    WBC 11.68 (H) 11/09/2018     Lab Results   Component Value Date    GLUCOSE 125 (H) 11/09/2018    CALCIUM 9.0 11/09/2018     11/09/2018    K 4.1 11/09/2018    CO2 22.3 11/09/2018     11/09/2018    BUN 13 11/09/2018    CREATININE 0.76 11/09/2018    EGFRIFNONA 78 11/09/2018    BCR 17.1 11/09/2018    ANIONGAP 13.7 11/09/2018         Radiology: Xr Femur 2  View Left    Result Date: 11/8/2018   Postsurgical changes, as described above.    This report was finalized on 11/8/2018 6:08 PM by Dr. David Eid M.D.      Xr Femur 2 View Left    Result Date: 11/7/2018  Proximal left femur fracture.  LEFT FEMUR 2 VIEWS  FINDINGS:  No fractures or other acute abnormalities are seen in the mid to distal left femur. There is some tibiofemoral joint space narrowing.  AP CHEST  FINDINGS:  Heart size is within normal limits. Lungs appear free of acute infiltrates. Surgical clips overlie the left axilla. Bones and soft tissues are unremarkable.  IMPRESSION:  No acute process.  This report was finalized on 11/7/2018 5:01 PM by Dr. Erick Villatoro M.D.      Xr Chest 1 View    Result Date: 11/7/2018  Proximal left femur fracture.  LEFT FEMUR 2 VIEWS  FINDINGS:  No fractures or other acute abnormalities are seen in the mid to distal left femur. There is some tibiofemoral joint space narrowing.  AP CHEST  FINDINGS:  Heart size is within normal limits. Lungs appear free of acute infiltrates. Surgical clips overlie the left axilla. Bones and soft tissues are unremarkable.  IMPRESSION:  No acute process.  This report was finalized on 11/7/2018 5:01 PM by Dr. Erick Villatoro M.D.      Xr Pelvis 1 Or 2 View    Result Date: 11/8/2018   Postsurgical changes, as described above.    This report was finalized on 11/8/2018 6:08 PM by Dr. David Eid M.D.      Xr Hip With Or Without Pelvis 2 - 3 View Left    Result Date: 11/7/2018  Proximal left femur fracture.  LEFT FEMUR 2 VIEWS  FINDINGS:  No fractures or other acute abnormalities are seen in the mid to distal left femur. There is some tibiofemoral joint space narrowing.  AP CHEST  FINDINGS:  Heart size is within normal limits. Lungs appear free of acute infiltrates. Surgical clips overlie the left axilla. Bones and soft tissues are unremarkable.  IMPRESSION:  No acute process.  This report was finalized on 11/7/2018 5:01 PM by   Erick Villatoro M.D.      Assessment: Patient is a 58 y.o. female who is 1 Day Post-Op s/p Procedure(s):  FEMUR INTRAMEDULLARY NAILING/RODDING   - Weight Bearing: Left Lower Extremity Weight Bearing As Tolerated  - Labs: Above lab values review. Plan: No intervention necessary at this time.   - PT/OT: To Mobilize  - DVT PPX: ASA 325mg Daily  - Post-Op Xray: Hardware in acceptable position.  The subtrochanteric fracture was difficult to reduce, rather than open up the fracture site and stripped the bone of its vascularity, I elected to close reduce the fracture and pass the nail.  I worry about the healing of this particular type of fracture because subtrochanteric fractures difficult to heal as well as the fact that this was a bisphosphonate fracture which further makes it difficult to heal.  I felt that opening up the fracture and stripping of its blood supply would lead to an increased complication rate.  - Dispo: Needs a couple days of mobilization.  Patient does wish to go home.  We will see how she does with physical therapy.      Samy Altamirano II, MD  Orthopaedic Surgery  [facility]

## 2018-11-09 NOTE — PROGRESS NOTES
" LOS: 2 days     Name: Bonnie Syed  Age: 58 y.o.  Sex: female  :  1960  MRN: [id number]         Primary Care Physician: Cory Mancilla MD    Subjective   Subjective  States she is feeling better today than she did preoperatively.  Improved more so by sitting up in the chair.  Denies chest pain or shortness of breath.    Objective   Vital Signs  Temp:  [97.9 °F (36.6 °C)-99.5 °F (37.5 °C)] 98.3 °F (36.8 °C)  Heart Rate:  [] 88  Resp:  [12-23] 16  BP: (108-149)/(72-98) 128/77  Body mass index is 23.17 kg/m².    Objective:  General Appearance:  Comfortable and in no acute distress.    Vital signs: (most recent): Blood pressure 128/77, pulse 88, temperature 98.3 °F (36.8 °C), temperature source Oral, resp. rate 16, height 162.6 cm (64\"), weight 61.2 kg (135 lb), SpO2 96 %.    Lungs:  Normal effort and normal respiratory rate.    Heart: Normal rate.  Regular rhythm.    Abdomen: Abdomen is soft.  Bowel sounds are normal.   There is no abdominal tenderness.     Extremities: There is no dependent edema or local swelling.    Neurological: Patient is alert and oriented to person, place and time.    Skin:  Warm and dry.              Results Review:       I reviewed the patient's new clinical results.      Results from last 7 days  Lab Units 18  03518  04218  0738   WBC 10*3/mm3 11.68* 7.95 5.55   HEMOGLOBIN g/dL 12.5 13.8 15.0   PLATELETS 10*3/mm3 205 183 210       Results from last 7 days  Lab Units 18  03518  0421 18  0738   SODIUM mmol/L 138 137 140   POTASSIUM mmol/L 4.1 3.7 3.9   CHLORIDE mmol/L 102 100 101   CO2 mmol/L 22.3 24.1 26.0   BUN mg/dL 13 13 19   CREATININE mg/dL 0.76 0.74 0.85   CALCIUM mg/dL 9.0 9.2 10.2   GLUCOSE mg/dL 125* 117* 117*       Results from last 7 days  Lab Units 18  0738   INR  1.07             Scheduled Meds:     aspirin 325 mg Oral Daily   atenolol 25 mg Oral Daily   ceFAZolin 2 g Intravenous Q8H   pantoprazole 40 mg Oral Daily "   polyethylene glycol 17 g Oral Once   sodium chloride 3 mL Intravenous Q12H     PRN Meds:   •  acetaminophen  •  busPIRone  •  cyclobenzaprine  •  HYDROcodone-acetaminophen **OR** HYDROcodone-acetaminophen  •  HYDROmorphone  •  ondansetron  •  polyethylene glycol  •  [COMPLETED] Insert peripheral IV **AND** sodium chloride  •  sodium chloride  Continuous Infusions:    lactated ringers 9 mL/hr Last Rate: 9 mL/hr (11/08/18 2056)       Assessment/Plan   Active Hospital Problems    Diagnosis Date Noted   • **Closed displaced transverse fracture of shaft of left femur (CMS/[facility]) [S72.322A] 11/07/2018   • Long-term use of immunosuppressant medication [Z79.899] 08/20/2018   • Rheumatoid arthritis involving both hands with positive rheumatoid factor (CMS/[facility]) [M05.741, M05.742] 03/10/2017   • Essential hypertension [I10] 03/10/2017   • Osteopenia [M85.80] 03/10/2017      Resolved Hospital Problems    Diagnosis Date Noted Date Resolved   No resolved problems to display.       Assessment & Plan    - POD 1 left femur intramedullary nailing  - ASA for DVT prophylaxis, per ortho  - Begin ambulating today  - Continue atenolol.  Blood pressure looks great today  - We'll give her a dose of MiraLAX    Dennis Carmen MD  [facility]  11/09/18  9:27 AM

## 2018-11-09 NOTE — THERAPY EVALUATION
Acute Care - Physical Therapy Initial Evaluation  [facility]     Patient Name: Bonnie Syed  : 1960  MRN: [id number]  Today's Date: 2018   Onset of Illness/Injury or Date of Surgery: 18  Date of Referral to PT: 18  Referring Physician: Samy Duval      Admit Date: 2018    Visit Dx:     ICD-10-CM ICD-9-CM   1. Closed displaced transverse fracture of shaft of left femur, initial encounter (CMS/[facility]) S72.322A 821.01   2. Difficulty walking R26.2 719.7     Patient Active Problem List   Diagnosis   • Rheumatoid arthritis involving both hands with positive rheumatoid factor (CMS/[facility])   • Gastroesophageal reflux disease   • Essential hypertension   • Osteopenia   • History of lobular carcinoma of breast   • Health care maintenance   • Acute cystitis with hematuria   • Vaginal yeast infection   • Rash   • Long-term use of immunosuppressant medication   • Closed displaced transverse fracture of shaft of left femur (CMS/HCC)     Past Medical History:   Diagnosis Date   • Allergic     takes 2 allergy injections weekly   • Breast cancer (CMS/HCC)     right side- had a mastectomy   • Cancer (CMS/HCC)    • GERD (gastroesophageal reflux disease)    • Hypertension    • Reflux gastritis    • Rheumatoid arthritis (CMS/HCC)      Past Surgical History:   Procedure Laterality Date   • BREAST SURGERY     • BUNIONECTOMY     • COLONOSCOPY     • DILATATION AND CURETTAGE     • FEMUR IM NAILING/RODDING Left 2018    Procedure: FEMUR INTRAMEDULLARY NAILING/RODDING;  Surgeon: Samy Altamirano II, MD;  Location: [facility] OR;  Service: Orthopedics   • HYSTERECTOMY     • MASTECTOMY Right         PT ASSESSMENT (last 12 hours)      Physical Therapy Evaluation     Row Name 18 1028          PT Evaluation Time/Intention    Subjective Information complains of;pain  -MS     Document Type evaluation  -MS     Mode of Treatment physical therapy;individual therapy  -MS     Patient Effort good  -MS  "    Comment Pt. reports \"soreness\" in her Left hip this AM.  -MS     Row Name 11/09/18 1028          General Information    Onset of Illness/Injury or Date of Surgery 11/07/18  -MS     Referring Physician Samy Duval  -MS     Patient Observations agree to therapy;cooperative  -MS     Prior Level of Function independent:  -MS     Equipment Currently Used at Home none  -MS     Pertinent History of Current Functional Problem Pt. is s/p Left Femur Nailing  -MS     Existing Precautions/Restrictions fall  -MS     Risks Reviewed patient:  -MS     Benefits Reviewed patient:  -MS     Barriers to Rehab none identified  -MS     Row Name 11/09/18 1028          Cognitive Assessment/Intervention- PT/OT    Orientation Status (Cognition) oriented x 3  -MS     Follows Commands (Cognition) WNL  -MS     Personal Safety Interventions fall prevention program maintained;gait belt;nonskid shoes/slippers when out of bed;supervised activity  -MS     Row Name 11/09/18 1028          Mobility Assessment/Treatment    Extremity Weight-bearing Status left lower extremity  -MS     Left Lower Extremity (Weight-bearing Status) weight-bearing as tolerated (WBAT)  -MS     Row Name 11/09/18 1028          Bed Mobility Assessment/Treatment    Comment (Bed Mobility) Pt. up in chair this AM.  -MS     Row Name 11/09/18 1028          Transfer Assessment/Treatment    Transfer Assessment/Treatment sit-stand transfer;stand-sit transfer  -MS     Sit-Stand [place] (Transfers) contact guard  -MS     Stand-Sit [place] (Transfers) contact guard  -MS     Row Name 11/09/18 1028          Sit-Stand Transfer    Assistive Device (Sit-Stand Transfers) walker, front-wheeled  -MS     Row Name 11/09/18 1028          Stand-Sit Transfer    Assistive Device (Stand-Sit Transfers) walker, front-wheeled  -MS     Row Name 11/09/18 1028          Gait/Stairs Assessment/Training    [place] Level (Gait) contact guard;2 person assist;1 person to manage equipment  " -MS     Assistive Device (Gait) walker, front-wheeled  -MS     Distance in Feet (Gait) 100 feet  -MS     Pattern (Gait) step-through  -MS     Deviations/Abnormal Patterns (Gait) antalgic;uziel decreased  -MS     Row Name 11/09/18 1028          General ROM    GENERAL ROM COMMENTS BUE/RLE (WFL's)  -MS     Row Name 11/09/18 1028          MMT (Manual Muscle Testing)    General MMT Comments BUE/RLE (4-/5)  -MS     Row Name 11/09/18 1028          Therapeutic Exercise    Comment (Therapeutic Exercise) Left Hip ORIF protocol x 10 reps completed  -MS     Row Name 11/09/18 1028          Pain Assessment    Additional Documentation Pain Scale: Numbers Pre/Post-Treatment (Group)  -MS     Row Name 11/09/18 1028          Pain Scale: Numbers Pre/Post-Treatment    Pain Scale: Numbers, Pretreatment 6/10  -MS     Pain Scale: Numbers, Post-Treatment 6/10  -MS     Pain Location - Side Left  -MS     Pain Location hip  -MS     Pain Intervention(s) Medication (See MAR);Cold applied;Repositioned;Elevated;Rest  -MS     Row Name             Wound 11/08/18 1647 Left hip incision    Wound - Properties Group Date first assessed: 11/08/18  -MB Time first assessed: 1647  -MB Side: Left  -MB Location: hip  -MB Type: incision  -MB    Row Name 11/09/18 1028          Physical Therapy Clinical Impression    Date of Referral to PT 11/08/18  -MS     Criteria for Skilled Interventions Met (PT Clinical Impression) treatment indicated  -MS     Rehab Potential (PT Clinical Summary) good, to achieve stated therapy goals  -MS     Row Name 11/09/18 1028          Physical Therapy Goals    Bed Mobility Goal Selection (PT) bed mobility, PT goal 1  -MS     Transfer Goal Selection (PT) transfer, PT goal 1  -MS     Gait Training Goal Selection (PT) gait training, PT goal 1  -MS     Stairs Goal Selection (PT) stairs, PT goal 1  -MS     Additional Documentation Stairs Goal Selection (PT) (Row)  -MS     Row Name 11/09/18 1028          Bed Mobility Goal 1 (PT)     Activity/Assistive Device (Bed Mobility Goal 1, PT) bed mobility activities, all  -MS     [place] Level/Cues Needed (Bed Mobility Goal 1, PT) independent  -MS     Time Frame (Bed Mobility Goal 1, PT) long term goal (LTG);3 days  -MS     Row Name 11/09/18 1028          Transfer Goal 1 (PT)    Activity/Assistive Device (Transfer Goal 1, PT) transfers, all;walker, rolling  -MS     [place] Level/Cues Needed (Transfer Goal 1, PT) independent  -MS     Time Frame (Transfer Goal 1, PT) long term goal (LTG);2 - 3 days  -MS     Row Name 11/09/18 1028          Gait Training Goal 1 (PT)    Activity/Assistive Device (Gait Training Goal 1, PT) gait (walking locomotion);walker, rolling  -MS     [place] Level (Gait Training Goal 1, PT) independent  -MS     Distance (Gait Goal 1, PT) 200 feet  -MS     Time Frame (Gait Training Goal 1, PT) long term goal (LTG);2 - 3 days  -MS     Row Name 11/09/18 1028          Stairs Goal 1 (PT)    Activity/Assistive Device (Stairs Goal 1, PT) stairs, all skills  -MS     [place] Level/Cues Needed (Stairs Goal 1, PT) contact guard assist  -MS     Number of Stairs (Stairs Goal 1, PT) 2  -MS     Time Frame (Stairs Goal 1, PT) long term goal (LTG);3 days  -MS     Row Name 11/09/18 1028          Positioning and Restraints    Pre-Treatment Position sitting in chair/recliner  -MS     Post Treatment Position chair  -MS     In Chair notified nsg;reclined;sitting;call light within reach;encouraged to call for assist;with family/caregiver   All lines intact. Ice pack to Left hip.  -MS       User Key  (r) = Recorded By, (t) = Taken By, (c) = Cosigned By    Initials Name Provider Type    Karen Hernandez, RN Registered Nurse    Cuco Rae, PT Physical Therapist          Physical Therapy Education     Title: PT OT SLP Therapies (Done)     Topic: Physical Therapy (Done)     Point: Mobility training (Done)    Learning Progress Summary     Learner Status Readiness Method Response  Comment Documented by    Patient Done Acceptance BILL SPENCER NR  MS 11/09/18 1034          Point: Home exercise program (Done)    Learning Progress Summary     Learner Status Readiness Method Response Comment Documented by    Patient Done Acceptance BILL SPENCER NR  MS 11/09/18 1034          Point: Body mechanics (Done)    Learning Progress Summary     Learner Status Readiness Method Response Comment Documented by    Patient Done Acceptance BILL SPENCER NR  MS 11/09/18 1034          Point: Precautions (Done)    Learning Progress Summary     Learner Status Readiness Method Response Comment Documented by    Patient Done Acceptance BILL SPENCER NR  MS 11/09/18 1034                      User Key     Initials Effective Dates Name Provider Type Discipline    MS 04/03/18 -  Cuco Vega, PT Physical Therapist PT                PT Recommendation and Plan  Anticipated Discharge Disposition (PT): home with assist, home with home health  Planned Therapy Interventions (PT Eval): balance training, bed mobility training, gait training, home exercise program, patient/family education, postural re-education, stair training, strengthening, transfer training  Therapy Frequency (PT Clinical Impression): 2 times/day  Outcome Summary/Treatment Plan (PT)  Anticipated Discharge Disposition (PT): home with assist, home with home health  Plan of Care Reviewed With: patient  Outcome Summary: Pt. will benefit from skilled inpt. P.T. to address her functional deficits and to assist pt. in regaining her maximum level of independence with functional mobility.          Outcome Measures     Row Name 11/09/18 1000             How much help from another person do you currently need...    Turning from your back to your side while in flat bed without using bedrails? 3  -MS      Moving from lying on back to sitting on the side of a flat bed without bedrails? 3  -MS      Moving to and from a bed to a chair (including a wheelchair)? 3  -MS      Standing up from a chair  using your arms (e.g., wheelchair, bedside chair)? 3  -MS      Climbing 3-5 steps with a railing? 3  -MS      To walk in hospital room? 3  -MS      AM-PAC 6 Clicks Score 18  -MS         Functional Assessment    Outcome Measure Options AM-PAC 6 Clicks Basic Mobility (PT)  -MS        User Key  (r) = Recorded By, (t) = Taken By, (c) = Cosigned By    Initials Name Provider Type    Cuco Rae, PT Physical Therapist           Time Calculation:         PT Charges     Row Name 11/09/18 1035             Time Calculation    Start Time 1014  -MS      Stop Time 1031  -MS      Time Calculation (min) 17 min  -MS      PT Received On 11/09/18  -MS      PT - Next Appointment 11/09/18  -MS      PT Goal Re-Cert Due Date 11/14/18  -MS         Time Calculation- PT    Total Timed Code Minutes- PT 12 minute(s)  -MS        User Key  (r) = Recorded By, (t) = Taken By, (c) = Cosigned By    Initials Name Provider Type    Cuco Rae, PT Physical Therapist        Therapy Suggested Charges     Code   Minutes Charges    None           Therapy Charges for Today     Code Description Service Date Service Provider Modifiers Qty    72985332520 HC PT EVAL LOW COMPLEXITY 1 11/9/2018 Cuco Vega, PT GP 1    40837975347 HC PT THER PROC EA 15 MIN 11/9/2018 Cuco Vega, PT GP 1    14739004427 HC PT THER SUPP EA 15 MIN 11/9/2018 Cuco Vega, PT GP 1          PT G-Codes  Outcome Measure Options: AM-PAC 6 Clicks Basic Mobility (PT)  AM-PAC 6 Clicks Score: 18      Cuco Vega PT  11/9/2018

## 2018-11-09 NOTE — PLAN OF CARE
Problem: Patient Care Overview  Goal: Plan of Care Review  Outcome: Ongoing (interventions implemented as appropriate)   11/09/18 7336   Coping/Psychosocial   Plan of Care Reviewed With patient   Plan of Care Review   Progress improving   OTHER   Outcome Summary VSS. Pain controlled with PO pain med. Dressing c/d/i. Voiding per BRP. Ambulating with assist and walker. Discussed BP med and monitoring r/t HTN. Plans to d/c home with HH.        Problem: Fall Risk (Adult)  Goal: Identify Related Risk Factors and Signs and Symptoms  Outcome: Ongoing (interventions implemented as appropriate)    Goal: Absence of Fall  Outcome: Ongoing (interventions implemented as appropriate)      Problem: Skin Injury Risk (Adult)  Goal: Identify Related Risk Factors and Signs and Symptoms  Outcome: Ongoing (interventions implemented as appropriate)    Goal: Skin Health and Integrity  Outcome: Ongoing (interventions implemented as appropriate)      Problem: Fracture Orthopaedic (Adult)  Goal: Signs and Symptoms of Listed Potential Problems Will be Absent, Minimized or Managed (Fracture Orthopaedic)  Outcome: Ongoing (interventions implemented as appropriate)

## 2018-11-09 NOTE — PROGRESS NOTES
Discharge Planning Assessment  [facility]     Patient Name: Bonnie Syed  MRN: [id number]  Today's Date: 11/9/2018    Admit Date: 11/7/2018          Discharge Needs Assessment     Row Name 11/09/18 1407       Living Environment    Lives With spouse    Current Living Arrangements home/apartment/condo       Discharge Needs Assessment    Readmission Within the Last 30 Days no previous admission in last 30 days    Concerns to be Addressed basic needs    Discharge Facility/Level of Care Needs home with home health            Discharge Plan     Row Name 11/09/18 1407       Plan    Plan [facility]    Patient/Family in Agreement with Plan yes    Plan Comments Spoke with pt and spouse, verified correct information on facesheet and explained the role of CCP. Pt would like to d/c home with [facility], referral given to Bill with [facility] who states they are able to accept. Plan will be home with [facility] and family support. No other needs identified at this time.        Destination     No service coordination in this encounter.      Durable Medical Equipment     No service coordination in this encounter.      Dialysis/Infusion     No service coordination in this encounter.      Home Medical Care - Selection Complete     Service Request Status Selected Specialties Address Phone Number Fax Number    [facility] [place] Selected Home Health Services [id number] [address] [phone]      Social Care     No service coordination in this encounter.                Demographic Summary    No documentation.           Functional Status    No documentation.           Psychosocial    No documentation.           Abuse/Neglect    No documentation.           Legal    No documentation.           Substance Abuse    No documentation.           Patient Forms    No documentation.         Anya Cerda RN

## 2018-11-09 NOTE — THERAPY TREATMENT NOTE
Acute Care - Physical Therapy Treatment Note  [facility]     Patient Name: Bonnie Syed  : 1960  MRN: [id number]  Today's Date: 2018  Onset of Illness/Injury or Date of Surgery: 18  Date of Referral to PT: 18  Referring Physician: Samy Duval    Admit Date: 2018    Visit Dx:    ICD-10-CM ICD-9-CM   1. Closed displaced transverse fracture of shaft of left femur, initial encounter (CMS/[facility]) S72.322A 821.01   2. Difficulty walking R26.2 719.7     Patient Active Problem List   Diagnosis   • Rheumatoid arthritis involving both hands with positive rheumatoid factor (CMS/[facility])   • Gastroesophageal reflux disease   • Essential hypertension   • Osteopenia   • History of lobular carcinoma of breast   • Health care maintenance   • Acute cystitis with hematuria   • Vaginal yeast infection   • Rash   • Long-term use of immunosuppressant medication   • Closed displaced transverse fracture of shaft of left femur (CMS/[facility])       Therapy Treatment          Rehabilitation Treatment Summary     Row Name 18 1555 18 1028          Treatment Time/Intention    Discipline physical therapy assistant  -  --     Document Type therapy note (daily note)  -DANIE  --     Subjective Information complains of;fatigue;pain;weakness  -DANIE  --     Care Plan Review patient/other agree to care plan  -DANIE  --     Existing Precautions/Restrictions fall  -DANIE  --     Equipment Issued to Patient  -- walker, front wheeled  -MS     Recorded by [JM] Leida Bledsoe, PTA 18 1557 [MS] Cuco Vega, PT 18 1034     Row Name 18 1552             Bed Mobility Assessment/Treatment    Comment (Bed Mobility) up in room w/nsg  -DANIE      Recorded by [JM] Leida Bledsoe, PTA 18 1556      Row Name 18 8006             Stand-Sit Transfer    Stand-Sit [place] (Transfers) supervision;verbal cues  -DANIE      Assistive Device (Stand-Sit Transfers) walker, front-wheeled  -DANIE      Recorded by  [JM] Leida Bledsoe, PTA 11/09/18 1559      Row Name 11/09/18 1555             Gait/Stairs Assessment/Training    [place] Level (Gait) supervision;verbal cues  -JM      Assistive Device (Gait) walker, front-wheeled  -JM      Distance in Feet (Gait) 120  -JM      Deviations/Abnormal Patterns (Gait) uziel decreased;antalgic  -JM      [place] Level (Stairs) contact guard;verbal cues  -JM      Assistive Device (Stairs) walker, front-wheeled  -JM      Handrail Location (Stairs) none  -JM      Number of Steps (Stairs) 1   no consecutive steps, 1 at a time and shallow  -JM      Ascending Technique (Stairs) step-to-step  -JM      Descending Technique (Stairs) step-to-step  -JM      Comment (Gait/Stairs) backward w/supported wx  -JM      Recorded by [JM] Leida Bledsoe, PTA 11/09/18 1559      Row Name 11/09/18 1555             Therapeutic Exercise    Comment (Therapeutic Exercise) Lhip ORIF protocol x 15 reps , slight assist to initiate hip abd  -JM      Recorded by [JM] Leida Bledsoe, PTA 11/09/18 1559      Row Name 11/09/18 1555             Positioning and Restraints    Pre-Treatment Position standing in room  -JM      In Chair reclined;call light within reach;encouraged to call for assist  -JM      Recorded by [JM] Leida Bledsoe, PTA 11/09/18 1559      Row Name 11/09/18 1555             Pain Scale: Numbers Pre/Post-Treatment    Pain Scale: Numbers, Pretreatment 6/10  -JM      Pain Scale: Numbers, Post-Treatment 6/10  -JM      Pain Location - Side Left  -JM      Pain Location hip  -JM      Pain Intervention(s) Medication (See MAR);Repositioned  -JM      Recorded by [JM] Leida Bledsoe, PTA 11/09/18 1559      Row Name                Wound 11/08/18 1647 Left hip incision    Wound - Properties Group Date first assessed: 11/08/18 [MB] Time first assessed: 1647 [MB] Side: Left [MB] Location: hip [MB] Type: incision [MB] Recorded by:  [MB] Karen Ruiz RN 11/08/18 1647      User Key  (r) = Recorded  By, (t) = Taken By, (c) = Cosigned By    Initials Name Effective Dates Discipline    Karen Hernandez, RN 06/16/16 -  Nurse    Leida Terrell, PTA 03/07/18 -  PT    Cuco Rae, PT 04/03/18 -  PT          Wound 11/08/18 1647 Left hip incision (Active)   Dressing Appearance dry;intact;no drainage 11/9/2018 12:15 PM   Closure ESTUARDO 11/9/2018 12:15 PM   Base dressing in place, unable to visualize 11/9/2018 12:15 PM   Drainage Amount none 11/9/2018  4:10 AM   Dressing Care, Wound other (see comments) 11/8/2018  8:54 PM               PT Rehab Goals     Row Name 11/09/18 1028             Bed Mobility Goal 1 (PT)    Activity/Assistive Device (Bed Mobility Goal 1, PT) bed mobility activities, all  -MS      [place] Level/Cues Needed (Bed Mobility Goal 1, PT) independent  -MS      Time Frame (Bed Mobility Goal 1, PT) long term goal (LTG);3 days  -MS         Transfer Goal 1 (PT)    Activity/Assistive Device (Transfer Goal 1, PT) transfers, all;walker, rolling  -MS      [place] Level/Cues Needed (Transfer Goal 1, PT) independent  -MS      Time Frame (Transfer Goal 1, PT) long term goal (LTG);2 - 3 days  -MS         Gait Training Goal 1 (PT)    Activity/Assistive Device (Gait Training Goal 1, PT) gait (walking locomotion);walker, rolling  -MS      [place] Level (Gait Training Goal 1, PT) independent  -MS      Distance (Gait Goal 1, PT) 200 feet  -MS      Time Frame (Gait Training Goal 1, PT) long term goal (LTG);2 - 3 days  -MS         Stairs Goal 1 (PT)    Activity/Assistive Device (Stairs Goal 1, PT) stairs, all skills  -MS      [place] Level/Cues Needed (Stairs Goal 1, PT) contact guard assist  -MS      Number of Stairs (Stairs Goal 1, PT) 2  -MS      Time Frame (Stairs Goal 1, PT) long term goal (LTG);3 days  -MS        User Key  (r) = Recorded By, (t) = Taken By, (c) = Cosigned By    Initials Name Provider Type Discipline    Cuco Rae, PT Physical Therapist PT          Physical  Therapy Education     Title: PT OT SLP Therapies (Done)     Topic: Physical Therapy (Done)     Point: Mobility training (Done)    Learning Progress Summary     Learner Status Readiness Method Response Comment Documented by    Patient Done Acceptance BILL SPENCER NR  MS 11/09/18 1034          Point: Home exercise program (Done)    Learning Progress Summary     Learner Status Readiness Method Response Comment Documented by    Patient Done Acceptance BILL SPENCER NR  MS 11/09/18 1034          Point: Body mechanics (Done)    Learning Progress Summary     Learner Status Readiness Method Response Comment Documented by    Patient Done Acceptance BILL SPENCER NR  MS 11/09/18 1034          Point: Precautions (Done)    Learning Progress Summary     Learner Status Readiness Method Response Comment Documented by    Patient Done Acceptance BILL SPENCER NR  MS 11/09/18 1034                      User Key     Initials Effective Dates Name Provider Type Discipline    MS 04/03/18 -  Cuco Vega, PT Physical Therapist PT                    PT Recommendation and Plan                Outcome Measures     Row Name 11/09/18 1000             How much help from another person do you currently need...    Turning from your back to your side while in flat bed without using bedrails? 3  -MS      Moving from lying on back to sitting on the side of a flat bed without bedrails? 3  -MS      Moving to and from a bed to a chair (including a wheelchair)? 3  -MS      Standing up from a chair using your arms (e.g., wheelchair, bedside chair)? 3  -MS      Climbing 3-5 steps with a railing? 3  -MS      To walk in hospital room? 3  -MS      AM-PAC 6 Clicks Score 18  -MS         Functional Assessment    Outcome Measure Options AM-PAC 6 Clicks Basic Mobility (PT)  -MS        User Key  (r) = Recorded By, (t) = Taken By, (c) = Cosigned By    Initials Name Provider Type    Cuco Rae, PT Physical Therapist           Time Calculation:         PT Charges     Row Name  11/09/18 1559 11/09/18 1035          Time Calculation    Start Time 0330  - 1014  -MS     Stop Time 0355  - 1031  -MS     Time Calculation (min) 25 min  - 17 min  -MS     PT Received On 11/09/18  - 11/09/18  -MS     PT - Next Appointment 11/10/18  - 11/09/18  -MS     PT Goal Re-Cert Due Date  -- 11/14/18  -MS        Time Calculation- PT    Total Timed Code Minutes- PT 25 minute(s)  -JM 12 minute(s)  -MS       User Key  (r) = Recorded By, (t) = Taken By, (c) = Cosigned By    Initials Name Provider Type    Leida Terrell PTA Physical Therapy Assistant    Cuco Rae, PT Physical Therapist        Therapy Suggested Charges     Code   Minutes Charges    None           Therapy Charges for Today     Code Description Service Date Service Provider Modifiers Qty    87762028876 HC PT THER PROC EA 15 MIN 11/9/2018 Leida Bledsoe PTA GP 2          PT G-Codes  Outcome Measure Options: AM-PAC 6 Clicks Basic Mobility (PT)  AM-PAC 6 Clicks Score: 18    Leida Bledsoe PTA  11/9/2018

## 2018-11-10 VITALS
WEIGHT: 135 LBS | HEART RATE: 95 BPM | DIASTOLIC BLOOD PRESSURE: 76 MMHG | OXYGEN SATURATION: 97 % | SYSTOLIC BLOOD PRESSURE: 101 MMHG | RESPIRATION RATE: 16 BRPM | HEIGHT: 64 IN | BODY MASS INDEX: 23.05 KG/M2 | TEMPERATURE: 98.6 F

## 2018-11-10 LAB
BASOPHILS # BLD AUTO: 0.02 10*3/MM3 (ref 0–0.2)
BASOPHILS NFR BLD AUTO: 0.2 % (ref 0–1.5)
DEPRECATED RDW RBC AUTO: 42.5 FL (ref 37–54)
EOSINOPHIL # BLD AUTO: 0.29 10*3/MM3 (ref 0–0.7)
EOSINOPHIL NFR BLD AUTO: 2.8 % (ref 0.3–6.2)
ERYTHROCYTE [DISTWIDTH] IN BLOOD BY AUTOMATED COUNT: 13.4 % (ref 11.7–13)
HCT VFR BLD AUTO: 38.9 % (ref 35.6–45.5)
HGB BLD-MCNC: 12.2 G/DL (ref 11.9–15.5)
IMM GRANULOCYTES # BLD: 0 10*3/MM3 (ref 0–0.03)
IMM GRANULOCYTES NFR BLD: 0 % (ref 0–0.5)
LYMPHOCYTES # BLD AUTO: 1.94 10*3/MM3 (ref 0.9–4.8)
LYMPHOCYTES NFR BLD AUTO: 18.7 % (ref 19.6–45.3)
MCH RBC QN AUTO: 27.2 PG (ref 26.9–32)
MCHC RBC AUTO-ENTMCNC: 31.4 G/DL (ref 32.4–36.3)
MCV RBC AUTO: 86.6 FL (ref 80.5–98.2)
MONOCYTES # BLD AUTO: 1.23 10*3/MM3 (ref 0.2–1.2)
MONOCYTES NFR BLD AUTO: 11.9 % (ref 5–12)
NEUTROPHILS # BLD AUTO: 6.89 10*3/MM3 (ref 1.9–8.1)
NEUTROPHILS NFR BLD AUTO: 66.4 % (ref 42.7–76)
PLATELET # BLD AUTO: 184 10*3/MM3 (ref 140–500)
PMV BLD AUTO: 11.3 FL (ref 6–12)
RBC # BLD AUTO: 4.49 10*6/MM3 (ref 3.9–5.2)
WBC NRBC COR # BLD: 10.37 10*3/MM3 (ref 4.5–10.7)

## 2018-11-10 PROCEDURE — 85025 COMPLETE CBC W/AUTO DIFF WBC: CPT | Performed by: INTERNAL MEDICINE

## 2018-11-10 RX ORDER — HYDROCODONE BITARTRATE AND ACETAMINOPHEN 5; 325 MG/1; MG/1
1-2 TABLET ORAL EVERY 4 HOURS PRN
Qty: 36 TABLET | Refills: 0 | Status: SHIPPED | OUTPATIENT
Start: 2018-11-10 | End: 2018-12-28

## 2018-11-10 RX ORDER — ASPIRIN 325 MG
325 TABLET ORAL DAILY
Qty: 21 TABLET | Refills: 0 | Status: SHIPPED | OUTPATIENT
Start: 2018-11-11 | End: 2018-12-02

## 2018-11-10 RX ORDER — CYCLOBENZAPRINE HCL 5 MG
5 TABLET ORAL 3 TIMES DAILY PRN
Qty: 90 TABLET | Refills: 0 | Status: SHIPPED | OUTPATIENT
Start: 2018-11-10 | End: 2018-12-28

## 2018-11-10 RX ADMIN — POLYETHYLENE GLYCOL 3350 17 G: 17 POWDER, FOR SOLUTION ORAL at 09:30

## 2018-11-10 RX ADMIN — Medication 3 ML: at 09:00

## 2018-11-10 RX ADMIN — HYDROCODONE BITARTRATE AND ACETAMINOPHEN 2 TABLET: 5; 325 TABLET ORAL at 14:18

## 2018-11-10 RX ADMIN — PANTOPRAZOLE SODIUM 40 MG: 40 TABLET, DELAYED RELEASE ORAL at 09:31

## 2018-11-10 RX ADMIN — HYDROCODONE BITARTRATE AND ACETAMINOPHEN 2 TABLET: 5; 325 TABLET ORAL at 06:16

## 2018-11-10 RX ADMIN — ATENOLOL 25 MG: 25 TABLET ORAL at 09:31

## 2018-11-10 RX ADMIN — HYDROCODONE BITARTRATE AND ACETAMINOPHEN 2 TABLET: 5; 325 TABLET ORAL at 01:15

## 2018-11-10 RX ADMIN — ASPIRIN 325 MG: 325 TABLET ORAL at 09:30

## 2018-11-10 RX ADMIN — HYDROCODONE BITARTRATE AND ACETAMINOPHEN 2 TABLET: 5; 325 TABLET ORAL at 10:24

## 2018-11-10 RX ADMIN — CYCLOBENZAPRINE 5 MG: 10 TABLET, FILM COATED ORAL at 14:17

## 2018-11-10 NOTE — PLAN OF CARE
Problem: Patient Care Overview  Goal: Plan of Care Review  Outcome: Ongoing (interventions implemented as appropriate)   11/10/18 0409   Coping/Psychosocial   Plan of Care Reviewed With patient   OTHER   Outcome Summary VSS. ambulating well with only stand by assistance, percocets every 4 hours effective. voiding. discussed monitoring blood pressure, anticipating discharge soon.     Goal: Individualization and Mutuality  Outcome: Ongoing (interventions implemented as appropriate)    Goal: Discharge Needs Assessment  Outcome: Ongoing (interventions implemented as appropriate)      Problem: Fall Risk (Adult)  Goal: Identify Related Risk Factors and Signs and Symptoms  Outcome: Ongoing (interventions implemented as appropriate)    Goal: Absence of Fall  Outcome: Ongoing (interventions implemented as appropriate)      Problem: Skin Injury Risk (Adult)  Goal: Skin Health and Integrity  Outcome: Ongoing (interventions implemented as appropriate)      Problem: Fracture Orthopaedic (Adult)  Goal: Signs and Symptoms of Listed Potential Problems Will be Absent, Minimized or Managed (Fracture Orthopaedic)  Outcome: Ongoing (interventions implemented as appropriate)

## 2018-11-10 NOTE — PROGRESS NOTES
Patient is doing very well status post IM nail.  Physical therapy is recommending home with home health.  I am fine with discharge home when cleared by medicine.  She may go home from my standpoint later this afternoon or evening tomorrow.  Her labs all look okay.  Her dressings are clean and dry.  I will have them changed today.  Daily dry dressing changes until she sees me.  No showers for 1 week.  Follow-up with me 10-14 days after surgery for staple removal    Samy Altamirano II, MD  Orthopaedic Surgery  [facility]

## 2018-11-10 NOTE — DISCHARGE SUMMARY
"Date of Admission: 11/7/2018  Date of Discharge:  11/10/2018  Primary Care Physician: Cory Mancilla MD     Discharge Diagnosis:  Active Hospital Problems    Diagnosis Date Noted   • **Closed displaced transverse fracture of shaft of left femur (CMS/[facility]) [S72.322A] 11/07/2018   • Long-term use of immunosuppressant medication [Z79.899] 08/20/2018   • Rheumatoid arthritis involving both hands with positive rheumatoid factor (CMS/[facility]) [M05.741, M05.742] 03/10/2017   • Essential hypertension [I10] 03/10/2017   • Osteopenia [M85.80] 03/10/2017      Resolved Hospital Problems   No resolved problems to display.       Presenting Problem/History of Present Illness:  Closed displaced transverse fracture of shaft of left femur, initial encounter (CMS/[facility]) [S72.322A]     Hospital Course:  The patient is a 58 y.o. female with a history of rheumatoid arthritis and osteopenia on ibandronate who presented with a \"popping and crunching\" sensation followed by left thigh pain which occurred while walking down a flight of stairs.  This had been preceded by months of left thigh/groin pain.  Please see admission H&P from 11/7/18 for further details.  She was evaluated by Dr. Altamirano with orthopedic surgery and he took her to the OR on 11/8/18 for left femur IM nailing on 11/8/18 which she tolerated well.  Her postoperative course was uneventful.  Her pain was well-controlled with oral medications and she was ambulating with contact guard assist and a walker with PT.  She will be discharged home with home health.  She will need to follow up with Dr. Altamirano in about a week for staple removal.  Given the nature of her fracture as well as the prodromal thigh pain this was thought to have been due to bisphosphonate therapy rather than simply osteopenia.  Her bisphosphonate therapy will be discontinued.    Exam Today:  Blood pressure 101/76, pulse 95, temperature 98.6 °F (37 °C), temperature source Oral, resp. rate 16, height 162.6 cm (64\"), " weight 61.2 kg (135 lb), SpO2 97 %.  General Appearance:  Comfortable and in no acute distress.    Lungs:  Normal effort and normal respiratory rate.    Heart: Normal rate.  Regular rhythm.    Abdomen: Abdomen is soft.  Bowel sounds are normal.   There is no abdominal tenderness.     Extremities: There is no dependent edema or local swelling.    MSK: left thigh surgical sites dressed, c/d/i without significant tenderness, edema, erythema, or hematoma  Neurological: Patient is alert and oriented to person, place and time.    Skin:  Warm and dry.      Procedures Performed:  Procedure(s):  FEMUR INTRAMEDULLARY NAILING/RODDING      Consults:   Consults     Date and Time Order Name Status Description    11/7/2018 0847 Ortho (on-call MD unless specified) Completed     11/7/2018 0847 [facility] (on-call MD unless specified) Completed            Discharge Disposition:  Home or Self Care    Discharge Medications:     Discharge Medications      New Medications      Instructions Start Date   aspirin 325 MG tablet   325 mg, Oral, Daily      cyclobenzaprine 5 MG tablet  Commonly known as:  FLEXERIL   5 mg, Oral, 3 Times Daily PRN      HYDROcodone-acetaminophen 5-325 MG per tablet  Commonly known as:  NORCO   1-2 tablets, Oral, Every 4 Hours PRN      polyethylene glycol pack packet  Commonly known as:  MIRALAX   17 g, Oral, Daily PRN         Continue These Medications      Instructions Start Date   atenolol 25 MG tablet  Commonly known as:  TENORMIN   25 mg, Oral, Daily      busPIRone 5 MG tablet  Commonly known as:  BUSPAR   5 mg, Oral, 3 Times Daily PRN      calcium carbonate-vitamin d 600-400 MG-UNIT per tablet  Commonly known as:  CALTRATE 600+D   One tab po BID for osteopenia      fluconazole 150 MG tablet  Commonly known as:  DIFLUCAN   1 tab by mouth for vaginitis and may repeat if needed after 72 hours      leflunomide 20 MG tablet  Commonly known as:  ARAVA   20 mg, Oral, Daily      montelukast 10 MG tablet  Commonly known as:   SINGULAIR   No dose, route, or frequency recorded.      multivitamin capsule   1 capsule, Oral      pantoprazole 40 MG EC tablet  Commonly known as:  PROTONIX   40 mg, Oral, Daily      REMICADE IV   Intravenous         Stop These Medications    BONIVA 150 MG tablet  Generic drug:  ibandronate     cefdinir 300 MG capsule  Commonly known as:  OMNICEF            Discharge Diet:   Diet Instructions     Diet: Regular; Thin      Discharge Diet:  Regular    Fluid Consistency:  Thin          Activity at Discharge:   Activity Instructions     Activity as Tolerated            Follow-up Appointments:  Future Appointments   Date Time Provider Department Center   12/4/2018  3:00 PM Pa Abbott MD [facility] None     Additional Instructions for the Follow-ups that You Need to Schedule     Discharge Follow-up with Specified Provider: Dr. Altamirano (Orthopedic Surgery); 1 Week   As directed      To:  Dr. Altamirano (Orthopedic Surgery)    Follow Up:  1 Week         Referral to Home Health   As directed      Face to Face Visit Date:  11/10/2018    Follow-up Provider for Plan of Care?:  I treated the patient in an acute care facility and will not continue treatment after discharge.    Follow-up Provider:  ANDERS NEVES [[id number]]    Reason/Clinical Findings:  left femur fracture    Describe mobility limitations that make leaving home difficult:  left femur fracture    Nursing/Therapeutic Services Requested:  Physical Therapy    PT orders:  Strengthening Therapeutic exercise    Frequency:  1 Week 1               Cuco Newton MD  11/10/18  1:19 PM    Time Spent on Discharge Activities: Greater than 30 minutes.

## 2018-11-11 ENCOUNTER — READMISSION MANAGEMENT (OUTPATIENT)
Dept: CALL CENTER | Facility: HOSPITAL | Age: 58
End: 2018-11-11

## 2018-11-11 NOTE — OUTREACH NOTE
Prep Survey      Responses   Facility patient discharged from?  [place]   Is patient eligible?  Yes   Discharge diagnosis  FEMUR FRACTURE  left femur IM nailing rheumatoid arthritis    Does the patient have one of the following disease processes/diagnoses(primary or secondary)?  General Surgery   Does the patient have Home health ordered?  Yes   What is the Home health agency?   [facility]   Is there a DME ordered?  No   Prep survey completed?  Yes          Kate Samano RN

## 2018-11-12 ENCOUNTER — TELEPHONE (OUTPATIENT)
Dept: FAMILY MEDICINE CLINIC | Facility: CLINIC | Age: 58
End: 2018-11-12

## 2018-11-12 NOTE — TELEPHONE ENCOUNTER
Pt admitted in hospital on 11/7   She had closed fracture   And had some nailing by orthopedic   [facility] called asking for verbal orders for physical therapy at home  Best# [phone]

## 2018-11-14 ENCOUNTER — READMISSION MANAGEMENT (OUTPATIENT)
Dept: CALL CENTER | Facility: HOSPITAL | Age: 58
End: 2018-11-14

## 2018-11-21 ENCOUNTER — READMISSION MANAGEMENT (OUTPATIENT)
Dept: CALL CENTER | Facility: HOSPITAL | Age: 58
End: 2018-11-21

## 2018-11-21 NOTE — OUTREACH NOTE
General Surgery Week 2 Survey      Responses   Facility patient discharged from?  [place]   Does the patient have one of the following disease processes/diagnoses(primary or secondary)?  General Surgery   Week 2 attempt successful?  Yes   Call start time  1612   Call end time  1617   Discharge diagnosis  FEMUR FRACTURE  left femur IM nailing rheumatoid arthritis    Meds reviewed with patient/caregiver?  Yes   Is the patient taking all medications as directed (includes completed medication regime)?  Yes   Has the patient kept scheduled appointments due by today?  Yes   Home health comments    D/C pt yesterday.    What is the patient's perception of their health status since discharge?  Improving   Is the patient /caregiver able to teach back basic post-op care?  Take showers only when approved by MD-sponge bathe until then, Keep incision areas clean,dry and protected   Is the patient/caregiver able to teach back signs and symptoms of incisional infection?  Increased redness, swelling or pain at the incisonal site, Increased drainage or bleeding, Incisional warmth, Pus or odor from incision, Fever   Is the patient/caregiver able to teach back steps to recovery at home?  Rest and rebuild strength, gradually increase activity, Eat a well-balance diet, Practice good oral hygiene   Additional teach back comments  Pt says incision looks good. Had stitches out today. Advised to leave area open to air. May shower and clean gently with soup and water. Pat  dry. Pt is using walker. Will start OP PT next week.    Week 2 call completed?  Yes          Hailee Regalado RN

## 2018-11-29 ENCOUNTER — READMISSION MANAGEMENT (OUTPATIENT)
Dept: CALL CENTER | Facility: HOSPITAL | Age: 58
End: 2018-11-29

## 2018-11-29 NOTE — OUTREACH NOTE
General Surgery Week 3 Survey      Responses   Facility patient discharged from?  [place]   Does the patient have one of the following disease processes/diagnoses(primary or secondary)?  General Surgery   Week 3 attempt successful?  Yes   Call start time  1144   Call end time  1148   Discharge diagnosis  FEMUR FRACTURE  left femur IM nailing rheumatoid arthritis    Meds reviewed with patient/caregiver?  Yes   Is the patient taking all medications as directed (includes completed medication regime)?  Yes   Has the patient kept scheduled appointments due by today?  Yes   What is the Home health agency?   [facility]   Home health comments  Outpatient therapy started yesterday   What DME was ordered?  Using walker   Psychosocial issues?  No   What is the patient's perception of their health status since discharge?  Improving   Week 3 call completed?  Yes   Revoked  No further contact(revokes)-requires comment [Goals met]          Kaykay Melendez RN

## 2018-12-28 ENCOUNTER — OFFICE VISIT (OUTPATIENT)
Dept: FAMILY MEDICINE CLINIC | Facility: CLINIC | Age: 58
End: 2018-12-28

## 2018-12-28 VITALS
OXYGEN SATURATION: 96 % | SYSTOLIC BLOOD PRESSURE: 106 MMHG | HEIGHT: 64 IN | TEMPERATURE: 97.9 F | WEIGHT: 131.4 LBS | HEART RATE: 91 BPM | BODY MASS INDEX: 22.43 KG/M2 | DIASTOLIC BLOOD PRESSURE: 79 MMHG

## 2018-12-28 DIAGNOSIS — K21.9 GASTROESOPHAGEAL REFLUX DISEASE, ESOPHAGITIS PRESENCE NOT SPECIFIED: ICD-10-CM

## 2018-12-28 DIAGNOSIS — Z85.3 PERSONAL HISTORY OF BREAST CANCER: ICD-10-CM

## 2018-12-28 DIAGNOSIS — M05.741 RHEUMATOID ARTHRITIS INVOLVING BOTH HANDS WITH POSITIVE RHEUMATOID FACTOR (HCC): Primary | ICD-10-CM

## 2018-12-28 DIAGNOSIS — I10 ESSENTIAL HYPERTENSION: ICD-10-CM

## 2018-12-28 DIAGNOSIS — M05.742 RHEUMATOID ARTHRITIS INVOLVING BOTH HANDS WITH POSITIVE RHEUMATOID FACTOR (HCC): Primary | ICD-10-CM

## 2018-12-28 DIAGNOSIS — M85.80 OSTEOPENIA, UNSPECIFIED LOCATION: ICD-10-CM

## 2018-12-28 PROBLEM — N81.10 PROLAPSE OF ANTERIOR VAGINAL WALL: Status: ACTIVE | Noted: 2018-12-06

## 2018-12-28 PROBLEM — N30.01 ACUTE CYSTITIS WITH HEMATURIA: Status: RESOLVED | Noted: 2017-03-17 | Resolved: 2018-12-28

## 2018-12-28 PROBLEM — B37.31 VAGINAL YEAST INFECTION: Status: RESOLVED | Noted: 2017-08-07 | Resolved: 2018-12-28

## 2018-12-28 PROBLEM — N95.2 ATROPHIC VAGINITIS: Status: ACTIVE | Noted: 2018-12-06

## 2018-12-28 PROBLEM — Z00.00 HEALTH CARE MAINTENANCE: Status: RESOLVED | Noted: 2017-03-10 | Resolved: 2018-12-28

## 2018-12-28 PROBLEM — R21 RASH: Status: RESOLVED | Noted: 2018-05-11 | Resolved: 2018-12-28

## 2018-12-28 PROCEDURE — 99214 OFFICE O/P EST MOD 30 MIN: CPT | Performed by: FAMILY MEDICINE

## 2018-12-28 RX ORDER — CYCLOBENZAPRINE HCL 5 MG
5 TABLET ORAL 3 TIMES DAILY PRN
Qty: 90 TABLET | Refills: 0 | Status: CANCELLED | OUTPATIENT
Start: 2018-12-28

## 2018-12-28 RX ORDER — RANITIDINE 150 MG/1
150 TABLET ORAL 2 TIMES DAILY PRN
COMMUNITY
End: 2019-10-30

## 2018-12-28 NOTE — PROGRESS NOTES
"Torres Syed is a 58 y.o. female.     Chief Complaint   Patient presents with   • Establish Care     Transfer Dr. Mancilla  pt ius not fasting   • Hypertension   • Rheumatoid Arthritis   • Heartburn   • Hip Injury     fracture left femur x 7 weeks   • Nail Problem     left toe        History of Present Illness    New patient.  Here to get established.    Hypertension.  She's been taking atenolol for number of years.  Well-controlled.  No cardiovascular symptoms.  No known low blood pressure readings.  No asthma.    Rheumatoid arthritis.  She continues Remicade and leflunomide.  She is followed closely by her rheumatologist.  She has been off the Remicade for the last number of weeks.  After hip fracture.    Left hip fracture.  Reportedly spontaneous.  Reportedly related to stress fracture and possible rue of bisphosphonate for maybe 12 years.  She states her most recent DEXA scan showed osteopenia.  She continues calcium and vitamin D.  She continues physical therapy.    GERD.  She has now off the proton X.  She's taking as needed ranitidine.    Yellow discolored nail.  Toenail.  She was interested in antifungal treatment.    Breast cancer.  Remote.  She was released from the [facility].  She continues to follow with her gynecologist.  Mammogram up-to-date.    The following portions of the patient's history were reviewed and updated as appropriate: allergies, current medications, past family history, past medical history, past social history, past surgical history and problem list.          Review of Systems   Constitutional: Negative.    Respiratory: Negative.    Cardiovascular: Negative.    Musculoskeletal: Positive for arthralgias. Negative for joint swelling.   Psychiatric/Behavioral: Negative.        Objective   Blood pressure 106/79, pulse 91, temperature 97.9 °F (36.6 °C), temperature source Oral, height 162.6 cm (64.02\"), weight 59.6 kg (131 lb 6.4 oz), SpO2 96 %.  Physical Exam "   Constitutional: She appears well-developed and well-nourished. No distress.   HENT:   Head: Atraumatic.   Eyes: Conjunctivae are normal.   Neck: No thyromegaly present.   Cardiovascular: Normal rate, regular rhythm, normal heart sounds and intact distal pulses.   Pulmonary/Chest: Effort normal and breath sounds normal.   Musculoskeletal: She exhibits deformity. She exhibits no edema.   Rheumatoid arthritis changes noted in hands.   Skin: Skin is warm and dry.   Psychiatric: She has a normal mood and affect. Her behavior is normal. Judgment and thought content normal.   Nursing note and vitals reviewed.      Assessment/Plan   Bonnie was seen today for establish care, hypertension, rheumatoid arthritis, heartburn, hip injury and nail problem.    Diagnoses and all orders for this visit:    Rheumatoid arthritis involving both hands with positive rheumatoid factor (CMS/[facility])    Essential hypertension    Gastroesophageal reflux disease, esophagitis presence not specified    Personal history of breast cancer    Osteopenia, unspecified location    Other orders  -     Cancel: cyclobenzaprine (FLEXERIL) 5 MG tablet; Take 1 tablet by mouth 3 (Three) Times a Day As Needed for Muscle Spasms.    New patient visit.     Rheumatoid arthritis.  Both hands.  She's taking the leflunomide currently.  Currently not taking Remicade.  She complains of toenail fungus symptoms.  Mild.  I do not recommend treatment for mild toenail fungus.  The medication could cause liver trouble, especially given her above medications.    GERD.  Essentially resolved.  She takes occasional Zantac.    Breast cancer.  In reported remission.  Released from oncology some years ago.  She has yearly mammograms from GYN.  Reportedly recently normal.    Osteopenia.  Recent hip fracture.  She no longer takes bisphosphonates after 12 years of therapy.    Hypertension.  Stable.  Continue low-dose atenolol.    Follow-up in 6 months.  For annual wellness visit

## 2019-01-24 ENCOUNTER — TELEPHONE (OUTPATIENT)
Dept: FAMILY MEDICINE CLINIC | Facility: CLINIC | Age: 59
End: 2019-01-24

## 2019-01-24 ENCOUNTER — OFFICE VISIT (OUTPATIENT)
Dept: FAMILY MEDICINE CLINIC | Facility: CLINIC | Age: 59
End: 2019-01-24

## 2019-01-24 VITALS
SYSTOLIC BLOOD PRESSURE: 151 MMHG | HEART RATE: 93 BPM | WEIGHT: 131.9 LBS | OXYGEN SATURATION: 98 % | BODY MASS INDEX: 22.63 KG/M2 | TEMPERATURE: 97.5 F | DIASTOLIC BLOOD PRESSURE: 84 MMHG

## 2019-01-24 DIAGNOSIS — M05.741 RHEUMATOID ARTHRITIS INVOLVING BOTH HANDS WITH POSITIVE RHEUMATOID FACTOR (HCC): ICD-10-CM

## 2019-01-24 DIAGNOSIS — M05.742 RHEUMATOID ARTHRITIS INVOLVING BOTH HANDS WITH POSITIVE RHEUMATOID FACTOR (HCC): ICD-10-CM

## 2019-01-24 DIAGNOSIS — Z79.60 LONG-TERM USE OF IMMUNOSUPPRESSANT MEDICATION: ICD-10-CM

## 2019-01-24 DIAGNOSIS — B35.1 TOENAIL FUNGUS: Primary | ICD-10-CM

## 2019-01-24 PROCEDURE — 99213 OFFICE O/P EST LOW 20 MIN: CPT | Performed by: NURSE PRACTITIONER

## 2019-01-24 RX ORDER — CLOBETASOL PROPIONATE 0.5 MG/G
OINTMENT TOPICAL
COMMUNITY
Start: 2019-01-15 | End: 2019-10-30

## 2019-01-24 NOTE — PROGRESS NOTES
Subjective   Bonnie Syed is a 58 y.o. female.     Chief Complaint   Patient presents with   • Nail Problem     has a fungas but its red now and she is on remicad and they want her checked out left foot      HPI   Patient is new to me.  She is here for an evaluation and for paronychia.  She has ongoing rheumatoid arthritis and is on Remicade for this.  She has had chronic left foot nail fungus and is using topicals without much relief.  She has noticed a blister on her left third toe, a couple days ago which is not painful but is mildly erythematous.  She has been covering it with Band-Aid to avoid any rubbing and tries to avoid shoes when able.  Rheumatologist wanted her evaluated for an infection.    Social History     Tobacco Use   • Smoking status: Never Smoker   • Smokeless tobacco: Never Used   Substance Use Topics   • Alcohol use: No   • Drug use: No       The following portions of the patient's history were reviewed and updated as appropriate: allergies, current medications, past family history, past medical history, past social history, past surgical history and problem list.    Review of Systems   Constitutional: Negative for chills and fever.   Musculoskeletal: Positive for arthralgias and joint swelling.        Has long history of rheumatoid arthritis for which she is currently seen by rheumatology and taking Remicade  Had recent femur fracture due to being on Boniva for a long period of time   Skin: Negative for pallor, rash and wound.        Has noticed small blister left dorsal third toe is not painful  Has toenail fungus ongoing   Neurological: Negative for numbness.       Objective   Blood pressure 151/84, pulse 93, temperature 97.5 °F (36.4 °C), weight 59.8 kg (131 lb 14.4 oz), SpO2 98 %.    Physical Exam   Constitutional: She is oriented to person, place, and time. She appears well-developed and well-nourished. No distress.   Musculoskeletal:   Has obvious deformities to joints of left foot and  toes with lateral shifting  There is overlapping of toes and rubbing   Neurological: She is alert and oriented to person, place, and time.   Has good sensation in his left foot and toes   Skin: Skin is warm and dry. Capillary refill takes 2 to 3 seconds. No rash noted.   Toes on left foot are yellow and thickened from nail fungus  Left third toe dorsum has a small superficial blister that is no longer intact there is very mild erythema that appears to be more from rubbing against shoe  No apparent paronychia at this time noted there is no tenderness drainage or erythema around toenail   Psychiatric: She has a normal mood and affect. Her behavior is normal.       Assessment   Problem List Items Addressed This Visit        Musculoskeletal and Integument    Rheumatoid arthritis involving both hands with positive rheumatoid factor (CMS/HCC)    Overview     Dr Neftali Sotomayor         Toenail fungus - Primary       Other    Long-term use of immunosuppressant medication                 Impression and Plan: Appears to have rheumatoid arthritis and is followed by her rheumatologist.  She has chronic nail fungal infections of her toes.  We discussed options for treatment including by mouth medications and need for liver monitoring, however patient prefers to wait on any treatment at this time.  I recommended that she see a podiatrist to see if there are any orthotics  to prevent any future infections due to rubbing of her toes or any other treatments for toenail fungus.  We discussed the signs and symptoms of any worsening of her blister and she will call the office if she notices any worsening.  She will see which podiatrist is in her network and make an appointment.      Health Maintenance Due   Topic Date Due   • ZOSTER VACCINE (1 of 2) 09/18/2010   • ANNUAL PHYSICAL  03/11/2018   • INFLUENZA VACCINE  08/01/2018

## 2019-01-24 NOTE — TELEPHONE ENCOUNTER
Pt is calling saying that she has been trying to fight toe fungus on her left foot the 3rd and 4th digit with over the counter medication. She said that they are not helping and now the 4th digit is red. She is not running a fever, no swelling, and its not warm to the touch. She is wondering if you could call something in. Please advise.

## 2019-01-24 NOTE — PATIENT INSTRUCTIONS
Fungal Nail Infection  Fungal nail infection is a common fungal infection of the toenails or fingernails. This condition affects toenails more often than fingernails. More than one nail may be infected. The condition can be passed from person to person (is contagious).  What are the causes?  This condition is caused by a fungus. Several types of funguses can cause the infection. These funguses are common in moist and warm areas. If your hands or feet come into contact with the fungus, it may get into a crack in your fingernail or toenail and cause the infection.  What increases the risk?  The following factors may make you more likely to develop this condition:  · Being male.  · Having diabetes.  · Being of older age.  · Living with someone who has the fungus.  · Walking barefoot in areas where the fungus thrives, such as showers or locker rooms.  · Having poor circulation.  · Wearing shoes and socks that cause your feet to sweat.  · Having athlete’s foot.  · Having a nail injury or history of a recent nail surgery.  · Having psoriasis.  · Having a weak body defense system (immune system).    What are the signs or symptoms?  Symptoms of this condition include:  · A pale spot on the nail.  · Thickening of the nail.  · A nail that becomes yellow or brown.  · A brittle or ragged nail edge.  · A crumbling nail.  · A nail that has lifted away from the nail bed.    How is this diagnosed?  This condition is diagnosed with a physical exam. Your health care provider may take a scraping or clipping from your nail to test for the fungus.  How is this treated?  Mild infections do not need treatment. If you have significant nail changes, treatment may include:  · Oral antifungal medicines. You may need to take the medicine for several weeks or several months, and you may not see the results for a long time. These medicines can cause side effects. Ask your health care provider what problems to watch for.  · Antifungal nail polish  and nail cream. These may be used along with oral antifungal medicines.  · Laser treatment of the nail.  · Surgery to remove the nail. This may be needed for the most severe infections.    Treatment takes a long time, and the infection may come back.  Follow these instructions at home:  Medicines  · Take or apply over-the-counter and prescription medicines only as told by your health care provider.  · Ask your health care provider about using over-the-counter mentholated ointment on your nails.  Lifestyle    · Do not share personal items, such as towels or nail clippers.  · Trim your nails often.  · Wash and dry your hands and feet every day.  · Wear absorbent socks, and change your socks frequently.  · Wear shoes that allow air to circulate, such as sandals or canvas tennis shoes. Throw out old shoes.  · Wear rubber gloves if you are working with your hands in wet areas.  · Do not walk barefoot in shower rooms or locker rooms.  · Do not use a nail salon that does not use clean instruments.  · Do not use artificial nails.  General instructions  · Keep all follow-up visits as told by your health care provider. This is important.  · Use antifungal foot powder on your feet and in your shoes.  Contact a health care provider if:  Your infection is not getting better or it is getting worse after several months.  This information is not intended to replace advice given to you by your health care provider. Make sure you discuss any questions you have with your health care provider.  Document Released: 12/15/2001 Document Revised: 05/25/2017 Document Reviewed: 06/20/2016  [employer] Interactive Patient Education © 2018 [employer]

## 2019-01-24 NOTE — TELEPHONE ENCOUNTER
Given her history, needs to be seen in the office within the next 24 hours.  I'm out of the office this afternoon.  She is welcome to see Nena if she wishes.

## 2019-06-19 RX ORDER — ATENOLOL 25 MG/1
25 TABLET ORAL DAILY
Qty: 90 TABLET | Refills: 1 | Status: SHIPPED | OUTPATIENT
Start: 2019-06-19 | End: 2019-11-18 | Stop reason: SDUPTHER

## 2019-10-22 DIAGNOSIS — I10 ESSENTIAL HYPERTENSION: Primary | ICD-10-CM

## 2019-10-22 DIAGNOSIS — Z00.00 HEALTH CARE MAINTENANCE: ICD-10-CM

## 2019-10-24 LAB
ALBUMIN SERPL-MCNC: 4.9 G/DL (ref 3.5–5.2)
ALBUMIN/GLOB SERPL: 2.2 G/DL
ALP SERPL-CCNC: 95 U/L (ref 39–117)
ALT SERPL-CCNC: 21 U/L (ref 1–33)
AST SERPL-CCNC: 21 U/L (ref 1–32)
BASOPHILS # BLD AUTO: 0.05 10*3/MM3 (ref 0–0.2)
BASOPHILS NFR BLD AUTO: 1 % (ref 0–1.5)
BILIRUB SERPL-MCNC: 0.3 MG/DL (ref 0.2–1.2)
BUN SERPL-MCNC: 13 MG/DL (ref 6–20)
BUN/CREAT SERPL: 16.9 (ref 7–25)
CALCIUM SERPL-MCNC: 9.6 MG/DL (ref 8.6–10.5)
CHLORIDE SERPL-SCNC: 103 MMOL/L (ref 98–107)
CHOLEST SERPL-MCNC: 232 MG/DL (ref 0–200)
CO2 SERPL-SCNC: 25.5 MMOL/L (ref 22–29)
CREAT SERPL-MCNC: 0.77 MG/DL (ref 0.57–1)
EOSINOPHIL # BLD AUTO: 0.26 10*3/MM3 (ref 0–0.4)
EOSINOPHIL NFR BLD AUTO: 5.1 % (ref 0.3–6.2)
ERYTHROCYTE [DISTWIDTH] IN BLOOD BY AUTOMATED COUNT: 13.1 % (ref 12.3–15.4)
GLOBULIN SER CALC-MCNC: 2.2 GM/DL
GLUCOSE SERPL-MCNC: 94 MG/DL (ref 65–99)
HCT VFR BLD AUTO: 43.2 % (ref 34–46.6)
HDLC SERPL-MCNC: 48 MG/DL (ref 40–60)
HGB BLD-MCNC: 14 G/DL (ref 12–15.9)
IMM GRANULOCYTES # BLD AUTO: 0.02 10*3/MM3 (ref 0–0.05)
IMM GRANULOCYTES NFR BLD AUTO: 0.4 % (ref 0–0.5)
LDLC SERPL CALC-MCNC: 126 MG/DL (ref 0–100)
LYMPHOCYTES # BLD AUTO: 1.8 10*3/MM3 (ref 0.7–3.1)
LYMPHOCYTES NFR BLD AUTO: 35.4 % (ref 19.6–45.3)
MCH RBC QN AUTO: 27.3 PG (ref 26.6–33)
MCHC RBC AUTO-ENTMCNC: 32.4 G/DL (ref 31.5–35.7)
MCV RBC AUTO: 84.4 FL (ref 79–97)
MONOCYTES # BLD AUTO: 0.84 10*3/MM3 (ref 0.1–0.9)
MONOCYTES NFR BLD AUTO: 16.5 % (ref 5–12)
NEUTROPHILS # BLD AUTO: 2.11 10*3/MM3 (ref 1.7–7)
NEUTROPHILS NFR BLD AUTO: 41.6 % (ref 42.7–76)
NRBC BLD AUTO-RTO: 0 /100 WBC (ref 0–0.2)
PLATELET # BLD AUTO: 223 10*3/MM3 (ref 140–450)
POTASSIUM SERPL-SCNC: 3.9 MMOL/L (ref 3.5–5.2)
PROT SERPL-MCNC: 7.1 G/DL (ref 6–8.5)
RBC # BLD AUTO: 5.12 10*6/MM3 (ref 3.77–5.28)
SODIUM SERPL-SCNC: 143 MMOL/L (ref 136–145)
TRIGL SERPL-MCNC: 289 MG/DL (ref 0–150)
VLDLC SERPL CALC-MCNC: 57.8 MG/DL
WBC # BLD AUTO: 5.08 10*3/MM3 (ref 3.4–10.8)

## 2019-10-24 NOTE — PROGRESS NOTES
Cholesterol and triglycerides are up slightly moderately elevated.  We will discuss more at upcoming visit.  Remainder of the lab work looks okay.

## 2019-10-30 ENCOUNTER — OFFICE VISIT (OUTPATIENT)
Dept: FAMILY MEDICINE CLINIC | Facility: CLINIC | Age: 59
End: 2019-10-30

## 2019-10-30 VITALS
SYSTOLIC BLOOD PRESSURE: 135 MMHG | WEIGHT: 133.3 LBS | HEART RATE: 85 BPM | HEIGHT: 64 IN | DIASTOLIC BLOOD PRESSURE: 89 MMHG | TEMPERATURE: 98 F | OXYGEN SATURATION: 98 % | BODY MASS INDEX: 22.76 KG/M2

## 2019-10-30 DIAGNOSIS — Z00.00 HEALTH CARE MAINTENANCE: Primary | ICD-10-CM

## 2019-10-30 PROCEDURE — 99396 PREV VISIT EST AGE 40-64: CPT | Performed by: FAMILY MEDICINE

## 2019-10-30 RX ORDER — AMOXICILLIN 875 MG/1
TABLET, COATED ORAL
COMMUNITY
Start: 2019-10-23 | End: 2019-11-02

## 2019-10-30 NOTE — PROGRESS NOTES
Torres Syed is a 59 y.o. female.     Chief Complaint   Patient presents with   • Annual Exam     follow up labs         History of Present Illness     Here for annual wellness visit.  She is been watching her diet.  Triglycerides and total cholesterol up slightly.  HDL mid range.  Ten-year risk based on ACC guidelines is 5.5%.  She continues with her treatments for rheumatoid arthritis through her rheumatologist.  Things have been stable.  She continues low-dose atenolol for hypertension.  Blood pressure is been running high at times.  She states she gets whitecoat hypertension.    Lab Results   Component Value Date    CHLPL 232 (H) 10/23/2019    TRIG 289 (H) 10/23/2019    HDL 48 10/23/2019     (H) 10/23/2019         The 10-year ASCVD risk score ([place] LEGGETT Jr., et al., 2013) is: 5.5%    Values used to calculate the score:      Age: 59 years      Sex: Female      Is Non- : No      Diabetic: No      Tobacco smoker: No      Systolic Blood Pressure: 135 mmHg      Is BP treated: Yes      HDL Cholesterol: 48 mg/dL      Total Cholesterol: 232 mg/dL        Social History     Tobacco Use   • Smoking status: Never Smoker   • Smokeless tobacco: Never Used   Substance Use Topics   • Alcohol use: No   • Drug use: No       There is no immunization history on file for this patient.  Family History   Problem Relation Age of Onset   • Hypertension Mother    • Hypertension Father    • Heart attack Maternal Grandfather    • Breast cancer Neg Hx    • Colon cancer Neg Hx          The following portions of the patient's history were reviewed and updated as appropriate: allergies, current medications, past family history, past medical history, past social history, past surgical history and problem list.          Review of Systems   Constitutional: Negative.    HENT: Negative.    Eyes: Negative.    Respiratory: Negative.    Cardiovascular: Negative.    Gastrointestinal: Negative.         No trouble  "swallowing.  No severe GERD symptoms.   Endocrine: Negative.    Genitourinary: Negative.    Musculoskeletal: Positive for arthralgias.   Skin: Negative.    Psychiatric/Behavioral: Negative.    All other systems reviewed and are negative.      Objective   Blood pressure 135/89, pulse 85, temperature 98 °F (36.7 °C), temperature source Oral, height 162.6 cm (64.02\"), weight 60.5 kg (133 lb 4.8 oz), SpO2 98 %.  Physical Exam   Constitutional: She is oriented to person, place, and time. She appears well-developed and well-nourished.   HENT:   Head: Normocephalic and atraumatic.   Right Ear: Tympanic membrane, external ear and ear canal normal.   Left Ear: Tympanic membrane, external ear and ear canal normal.   Nose: Nose normal.   Mouth/Throat: Oropharynx is clear and moist. No oropharyngeal exudate.   Eyes: EOM are normal. Pupils are equal, round, and reactive to light. No scleral icterus.   Neck: Normal range of motion. Neck supple. No thyromegaly present.   Cardiovascular: Normal rate, regular rhythm, normal heart sounds and intact distal pulses.   No murmur heard.  Pulmonary/Chest: Effort normal and breath sounds normal. She has no wheezes.   Abdominal: Soft. Bowel sounds are normal. She exhibits no distension and no mass. There is no tenderness. No hernia.   Musculoskeletal: Normal range of motion. She exhibits deformity ( Joints in the hands). She exhibits no edema.   Lymphadenopathy:     She has no cervical adenopathy.   Neurological: She is alert and oriented to person, place, and time. She exhibits normal muscle tone.   Skin: Skin is warm and dry. No rash noted.   Psychiatric: She has a normal mood and affect. Her behavior is normal. Judgment and thought content normal.   Nursing note and vitals reviewed.      Assessment/Plan   Bonnie was seen today for annual exam.    Diagnoses and all orders for this visit:    Health care maintenance        Annual wellness visit.    Immunizations.  Patient refused Tdap and " influenza vaccine today.  The benefits and risks of influenza vaccine discussed in detail.  She is interested in the Shingrix vaccine.  She is partially immunosuppressed.  Recommend she ask her rheumatologist first.    Rheumatoid arthritis.  She continues with a rheumatologist.  Symptoms have been stable.    Hypertension.  On low-dose atenolol.  I recommend she check her blood pressure at home on a regular basis and send us readings within few weeks.  Otherwise see me in 6 months to 12 months for follow-up.  If blood pressures running okay, 12-month follow-up.    Mild hyperlipidemia.  Lower carbohydrate diet low saturated fat diet discussed.    Breast cancer screening up-to-date.      Colon cancer screening up-to-date.    Osteoporosis screening, she has known osteoporosis.  She had a possible bisphosphonate associated hip fracture in months.  She had been on bisphosphonate therapy for 12 years.  She has no longer been taking the medication since her surgery.  This seems very reasonable.    Preventative health practices discussed including annual flu vaccine.

## 2019-11-20 RX ORDER — ATENOLOL 25 MG/1
25 TABLET ORAL DAILY
Qty: 90 TABLET | Refills: 1 | Status: SHIPPED | OUTPATIENT
Start: 2019-11-20 | End: 2020-04-21

## 2019-12-20 RX ORDER — PANTOPRAZOLE SODIUM 40 MG/1
40 TABLET, DELAYED RELEASE ORAL DAILY
Qty: 90 TABLET | Refills: 1 | Status: SHIPPED | OUTPATIENT
Start: 2019-12-20 | End: 2020-01-22

## 2020-01-22 ENCOUNTER — HOSPITAL ENCOUNTER (OUTPATIENT)
Dept: GENERAL RADIOLOGY | Facility: HOSPITAL | Age: 60
Discharge: HOME | End: 2020-01-22

## 2020-01-22 ENCOUNTER — APPOINTMENT (OUTPATIENT)
Dept: PREADMISSION TESTING | Facility: HOSPITAL | Age: 60
End: 2020-01-22

## 2020-01-22 ENCOUNTER — HOSPITAL ENCOUNTER (OUTPATIENT)
Dept: GENERAL RADIOLOGY | Facility: HOSPITAL | Age: 60
Discharge: HOME | End: 2020-01-22
Admitting: ORTHOPAEDIC SURGERY

## 2020-01-22 VITALS
SYSTOLIC BLOOD PRESSURE: 147 MMHG | OXYGEN SATURATION: 99 % | RESPIRATION RATE: 16 BRPM | TEMPERATURE: 98.1 F | HEART RATE: 91 BPM | WEIGHT: 133 LBS | BODY MASS INDEX: 22.71 KG/M2 | HEIGHT: 64 IN | DIASTOLIC BLOOD PRESSURE: 93 MMHG

## 2020-01-22 LAB
ABO GROUP BLD: NORMAL
ALBUMIN SERPL-MCNC: 4.9 G/DL (ref 3.5–5.2)
ALBUMIN/GLOB SERPL: 1.9 G/DL
ALP SERPL-CCNC: 98 U/L (ref 39–117)
ALT SERPL W P-5'-P-CCNC: 35 U/L (ref 1–33)
ANION GAP SERPL CALCULATED.3IONS-SCNC: 14.1 MMOL/L (ref 5–15)
APTT PPP: 30.4 SECONDS (ref 22.7–35.4)
AST SERPL-CCNC: 32 U/L (ref 1–32)
BACTERIA UR QL AUTO: ABNORMAL /HPF
BASOPHILS # BLD AUTO: 0.09 10*3/MM3 (ref 0–0.2)
BASOPHILS NFR BLD AUTO: 1.2 % (ref 0–1.5)
BILIRUB SERPL-MCNC: 0.3 MG/DL (ref 0.2–1.2)
BILIRUB UR QL STRIP: NEGATIVE
BLD GP AB SCN SERPL QL: NEGATIVE
BUN BLD-MCNC: 22 MG/DL (ref 6–20)
BUN/CREAT SERPL: 27.8 (ref 7–25)
CALCIUM SPEC-SCNC: 9.9 MG/DL (ref 8.6–10.5)
CHLORIDE SERPL-SCNC: 101 MMOL/L (ref 98–107)
CLARITY UR: CLEAR
CO2 SERPL-SCNC: 22.9 MMOL/L (ref 22–29)
COLOR UR: YELLOW
CREAT BLD-MCNC: 0.79 MG/DL (ref 0.57–1)
DEPRECATED RDW RBC AUTO: 38.1 FL (ref 37–54)
EOSINOPHIL # BLD AUTO: 0.44 10*3/MM3 (ref 0–0.4)
EOSINOPHIL NFR BLD AUTO: 5.9 % (ref 0.3–6.2)
ERYTHROCYTE [DISTWIDTH] IN BLOOD BY AUTOMATED COUNT: 12.8 % (ref 12.3–15.4)
GFR SERPL CREATININE-BSD FRML MDRD: 74 ML/MIN/1.73
GLOBULIN UR ELPH-MCNC: 2.6 GM/DL
GLUCOSE BLD-MCNC: 109 MG/DL (ref 65–99)
GLUCOSE UR STRIP-MCNC: NEGATIVE MG/DL
HCT VFR BLD AUTO: 46 % (ref 34–46.6)
HGB BLD-MCNC: 15.3 G/DL (ref 12–15.9)
HGB UR QL STRIP.AUTO: NEGATIVE
HYALINE CASTS UR QL AUTO: ABNORMAL /LPF
IMM GRANULOCYTES # BLD AUTO: 0.02 10*3/MM3 (ref 0–0.05)
IMM GRANULOCYTES NFR BLD AUTO: 0.3 % (ref 0–0.5)
INR PPP: 0.97 (ref 0.9–1.1)
KETONES UR QL STRIP: NEGATIVE
LEUKOCYTE ESTERASE UR QL STRIP.AUTO: ABNORMAL
LYMPHOCYTES # BLD AUTO: 2.07 10*3/MM3 (ref 0.7–3.1)
LYMPHOCYTES NFR BLD AUTO: 27.9 % (ref 19.6–45.3)
MCH RBC QN AUTO: 27.5 PG (ref 26.6–33)
MCHC RBC AUTO-ENTMCNC: 33.3 G/DL (ref 31.5–35.7)
MCV RBC AUTO: 82.6 FL (ref 79–97)
MONOCYTES # BLD AUTO: 0.76 10*3/MM3 (ref 0.1–0.9)
MONOCYTES NFR BLD AUTO: 10.3 % (ref 5–12)
NEUTROPHILS # BLD AUTO: 4.03 10*3/MM3 (ref 1.7–7)
NEUTROPHILS NFR BLD AUTO: 54.4 % (ref 42.7–76)
NITRITE UR QL STRIP: NEGATIVE
NRBC BLD AUTO-RTO: 0 /100 WBC (ref 0–0.2)
PH UR STRIP.AUTO: 5.5 [PH] (ref 5–8)
PLATELET # BLD AUTO: 280 10*3/MM3 (ref 140–450)
PMV BLD AUTO: 10.8 FL (ref 6–12)
POTASSIUM BLD-SCNC: 4.1 MMOL/L (ref 3.5–5.2)
PROT SERPL-MCNC: 7.5 G/DL (ref 6–8.5)
PROT UR QL STRIP: NEGATIVE
PROTHROMBIN TIME: 12.6 SECONDS (ref 11.7–14.2)
RBC # BLD AUTO: 5.57 10*6/MM3 (ref 3.77–5.28)
RBC # UR: ABNORMAL /HPF
REF LAB TEST METHOD: ABNORMAL
RH BLD: POSITIVE
SODIUM BLD-SCNC: 138 MMOL/L (ref 136–145)
SP GR UR STRIP: 1.02 (ref 1–1.03)
SQUAMOUS #/AREA URNS HPF: ABNORMAL /HPF
T&S EXPIRATION DATE: NORMAL
UROBILINOGEN UR QL STRIP: ABNORMAL
WBC NRBC COR # BLD: 7.41 10*3/MM3 (ref 3.4–10.8)
WBC UR QL AUTO: ABNORMAL /HPF

## 2020-01-22 PROCEDURE — 36415 COLL VENOUS BLD VENIPUNCTURE: CPT

## 2020-01-22 PROCEDURE — 93010 ELECTROCARDIOGRAM REPORT: CPT | Performed by: INTERNAL MEDICINE

## 2020-01-22 PROCEDURE — 85025 COMPLETE CBC W/AUTO DIFF WBC: CPT | Performed by: ORTHOPAEDIC SURGERY

## 2020-01-22 PROCEDURE — 86901 BLOOD TYPING SEROLOGIC RH(D): CPT | Performed by: ORTHOPAEDIC SURGERY

## 2020-01-22 PROCEDURE — 71046 X-RAY EXAM CHEST 2 VIEWS: CPT

## 2020-01-22 PROCEDURE — 73502 X-RAY EXAM HIP UNI 2-3 VIEWS: CPT

## 2020-01-22 PROCEDURE — 85730 THROMBOPLASTIN TIME PARTIAL: CPT | Performed by: ORTHOPAEDIC SURGERY

## 2020-01-22 PROCEDURE — 80053 COMPREHEN METABOLIC PANEL: CPT | Performed by: ORTHOPAEDIC SURGERY

## 2020-01-22 PROCEDURE — 93005 ELECTROCARDIOGRAM TRACING: CPT

## 2020-01-22 PROCEDURE — 86900 BLOOD TYPING SEROLOGIC ABO: CPT | Performed by: ORTHOPAEDIC SURGERY

## 2020-01-22 PROCEDURE — 85610 PROTHROMBIN TIME: CPT | Performed by: ORTHOPAEDIC SURGERY

## 2020-01-22 PROCEDURE — 81001 URINALYSIS AUTO W/SCOPE: CPT | Performed by: ORTHOPAEDIC SURGERY

## 2020-01-22 PROCEDURE — 86850 RBC ANTIBODY SCREEN: CPT | Performed by: ORTHOPAEDIC SURGERY

## 2020-01-22 RX ORDER — ACETAMINOPHEN 500 MG
1000 TABLET ORAL EVERY 6 HOURS PRN
COMMUNITY
End: 2021-11-03

## 2020-01-22 ASSESSMENT — HOOS JR
HOOS JR SCORE: 19
HOOS JR SCORE: 29.009

## 2020-01-22 NOTE — DISCHARGE INSTRUCTIONS
Take the following medications the morning of surgery:    ATENOLOL    General Instructions:  • Do not eat solid food after midnight the night before surgery.  • You may drink clear liquids day of surgery but must stop at least one hour before your hospital arrival time @ 0430 AM STOP DRINKING!!!  • It is beneficial for you to have a clear drink that contains carbohydrates the day of surgery.  We suggest a 12 to 20 ounce bottle of Gatorade or Powerade for non-diabetic patients or a 12 to 20 ounce bottle of G2 or Powerade Zero for diabetic patients.    Clear liquids are liquids you can see through.  Nothing red in color.     Plain water                               Sports drinks  Sodas                                   Gelatin (Jell-O)  Fruit juices without pulp such as white grape juice and apple juice  Popsicles that contain no fruit or yogurt  Tea or coffee (no cream or milk added)  Gatorade / Powerade  G2 / Powerade Zero    • Patients who avoid smoking, chewing tobacco and alcohol for 4 weeks prior to surgery have a reduced risk of post-operative complications.  Quit smoking as many days before surgery as you can.  • Do not smoke, use chewing tobacco or drink alcohol the day of surgery.   • If applicable bring your C-PAP/ BI-PAP machine.  • Bring any papers given to you in the doctor’s office.  • Wear clean comfortable clothes.  • Do not wear contact lenses, false eyelashes or make-up.  Bring a case for your glasses.   • Bring crutches or walker if applicable.  • Remove all piercings.  Leave jewelry and any other valuables at home.  • Hair extensions with metal clips must be removed prior to surgery.  • The Pre-Admission Testing nurse will instruct you to bring medications if unable to obtain an accurate list in Pre-Admission Testing.        You were given a blood bank ID arm band remember to bring it with you the day of surgery.    Preventing a Surgical Site Infection:  • For 2 to 3 days before surgery, avoid  shaving with a razor because the razor can irritate skin and make it easier to develop an infection.    • Any areas of open skin can increase the risk of a post-operative wound infection by allowing bacteria to enter and travel throughout the body.  Notify your surgeon if you have any skin wounds / rashes even if it is not near the expected surgical site.  The area will need assessed to determine if surgery should be delayed until it is healed.  • The night prior to surgery sleep in a clean bed with clean clothing.  Do not allow pets to sleep with you.  • Shower on the morning of surgery using a fresh bar of anti-bacterial soap (such as Dial) and clean washcloth.  Dry with a clean towel and dress in clean clothing.  • Ask your surgeon if you will be receiving antibiotics prior to surgery.  • Make sure you, your family, and all healthcare providers clean their hands with soap and water or an alcohol based hand  before caring for you or your wound.    Day of surgery:01- ARRIVE @ 0530 AM REPORT TO THE MAIN OR   Your arrival time is approximately two hours before your scheduled surgery time.  Upon arrival, a Pre-op nurse and Anesthesiologist will review your health history, obtain vital signs, and answer questions you may have.  The only belongings needed at this time will be a list of your home medications and if applicable your C-PAP/BI-PAP machine.  If you are staying overnight your family can leave the rest of your belongings in the car and bring them to your room later.  A Pre-op nurse will start an IV and you may receive medication in preparation for surgery, including something to help you relax.  Your family will be able to see you in the Pre-op area.  Two visitors at a time will be allowed in the Pre-op room.  While you are in surgery your family should notify the waiting room  if they leave the waiting room area and provide a contact phone number.    Please be aware that surgery  does come with discomfort.  We want to make every effort to control your discomfort so please discuss any uncontrolled symptoms with your nurse.   Your doctor will most likely have prescribed pain medications.      If you are going home after surgery you will receive individualized written care instructions before being discharged.  A responsible adult must drive you to and from the hospital on the day of your surgery and stay with you for 24 hours.    If you are staying overnight following surgery, you will be transported to your hospital room following the recovery period.  [facility] has all private rooms.    If you have any questions please call Pre-Admission Testing at [phone].  Deductibles and co-payments are collected on the day of service. Please be prepared to pay the required co-pay, deductible or deposit on the day of service as defined by your plan.  2% CHLORHEXIDINE GLUCONATE* CLOTH  Preparing or “prepping” skin before surgery can reduce the risk of infection at the surgical site. To make the process easier, [facility] has chosen disposable cloths moistened with a rinse-free, 2% Chlorhexidine Gluconate (CHG) antiseptic solution. The steps below outline the prepping process and should be carefully followed.        Use the prep cloth on the area that is circled in the diagram             Directions Night before Surgery 01- PM PRIOR TO SURGERY (LEFT LEG)  1) Shower using a fresh bar of anti-bacterial soap (such as Dial) and clean washcloth.  Use a clean towel to completely dry your skin.  2) Do not use any lotions, oils or creams on your skin.  3) Open the package and remove 1 cloth, wipe your skin for 30 seconds in a circular motion.  Allow to dry for 3 minutes.  4) Repeat #3 with second cloth.  5) Do not touch your eyes, ears, or mouth with the prep cloth.  6) Allow the wet prep solution to air dry.  7) Discard the prep cloth and wash your hands with soap and water.    8) Dress in clean bed clothes and sleep on fresh clean bed sheets.   9) You may experience some temporary itching after the prep.    Directions Day of Surgery 01- AM PRIOR TO SURGERY (LEFT LEG)  1) Repeat steps 1,2,3,4,5,6,7, and 9.   2) Dress in clean clothes before coming to the hospital.    BACTROBAN NASAL OINTMENT  There are many germs normally in your nose. Bactroban is an ointment that will help reduce these germs. Please follow these instructions for Bactroban use:      ____The day before surgery in the morning  [id number]__[id number]______    ____The day before surgery in the evening              [id number]__[id number]______    ____The day of surgery in the morning    [id number]__[id number]______    **Squirt ½ package of Bactroban Ointment onto a cotton applicator and apply to inside of 1st nostril.  Squirt the remaining Bactroban and apply to the inside of the other nostril.

## 2020-01-25 ENCOUNTER — ANESTHESIA EVENT (OUTPATIENT)
Dept: PERIOP | Facility: HOSPITAL | Age: 60
End: 2020-01-25

## 2020-01-25 ENCOUNTER — ANESTHESIA (OUTPATIENT)
Dept: PERIOP | Facility: HOSPITAL | Age: 60
End: 2020-01-25

## 2020-01-25 ENCOUNTER — APPOINTMENT (OUTPATIENT)
Dept: GENERAL RADIOLOGY | Facility: HOSPITAL | Age: 60
End: 2020-01-25

## 2020-01-25 ENCOUNTER — HOSPITAL ENCOUNTER (INPATIENT)
Facility: HOSPITAL | Age: 60
LOS: 1 days | Discharge: HOME HEALTH | End: 2020-01-26
Attending: ORTHOPAEDIC SURGERY | Admitting: ORTHOPAEDIC SURGERY

## 2020-01-25 DIAGNOSIS — Z96.649 STATUS POST TOTAL REPLACEMENT OF HIP, UNSPECIFIED LATERALITY: Primary | ICD-10-CM

## 2020-01-25 LAB
HCT VFR BLD AUTO: 36.6 % (ref 34–46.6)
HGB BLD-MCNC: 12.4 G/DL (ref 12–15.9)

## 2020-01-25 PROCEDURE — 25010000002 MIDAZOLAM PER 1 MG: Performed by: ANESTHESIOLOGY

## 2020-01-25 PROCEDURE — C1713 ANCHOR/SCREW BN/BN,TIS/BN: HCPCS | Performed by: ORTHOPAEDIC SURGERY

## 2020-01-25 PROCEDURE — 25010000002 ONDANSETRON PER 1 MG: Performed by: NURSE ANESTHETIST, CERTIFIED REGISTERED

## 2020-01-25 PROCEDURE — C1776 JOINT DEVICE (IMPLANTABLE): HCPCS | Performed by: ORTHOPAEDIC SURGERY

## 2020-01-25 PROCEDURE — 25010000002 VANCOMYCIN 1 G RECONSTITUTED SOLUTION: Performed by: ORTHOPAEDIC SURGERY

## 2020-01-25 PROCEDURE — 25010000002 ONDANSETRON PER 1 MG: Performed by: ORTHOPAEDIC SURGERY

## 2020-01-25 PROCEDURE — C9290 INJ, BUPIVACAINE LIPOSOME: HCPCS | Performed by: ORTHOPAEDIC SURGERY

## 2020-01-25 PROCEDURE — 25010000002 PROMETHAZINE PER 50 MG: Performed by: PHYSICIAN ASSISTANT

## 2020-01-25 PROCEDURE — 25010000002 VANCOMYCIN PER 500 MG: Performed by: ORTHOPAEDIC SURGERY

## 2020-01-25 PROCEDURE — 0SRB0JZ REPLACEMENT OF LEFT HIP JOINT WITH SYNTHETIC SUBSTITUTE, OPEN APPROACH: ICD-10-PCS | Performed by: ORTHOPAEDIC SURGERY

## 2020-01-25 PROCEDURE — 25010000002 PROPOFOL 10 MG/ML EMULSION: Performed by: NURSE ANESTHETIST, CERTIFIED REGISTERED

## 2020-01-25 PROCEDURE — 76000 FLUOROSCOPY <1 HR PHYS/QHP: CPT

## 2020-01-25 PROCEDURE — 25010000002 DEXAMETHASONE PER 1 MG: Performed by: NURSE ANESTHETIST, CERTIFIED REGISTERED

## 2020-01-25 PROCEDURE — 25010000003 BUPIVACAINE LIPOSOME 1.3 % SUSPENSION 20 ML VIAL: Performed by: ORTHOPAEDIC SURGERY

## 2020-01-25 PROCEDURE — 25010000002 KETOROLAC TROMETHAMINE PER 15 MG: Performed by: NURSE ANESTHETIST, CERTIFIED REGISTERED

## 2020-01-25 PROCEDURE — 25010000002 FENTANYL CITRATE (PF) 100 MCG/2ML SOLUTION: Performed by: NURSE ANESTHETIST, CERTIFIED REGISTERED

## 2020-01-25 PROCEDURE — C1769 GUIDE WIRE: HCPCS | Performed by: ORTHOPAEDIC SURGERY

## 2020-01-25 PROCEDURE — 73501 X-RAY EXAM HIP UNI 1 VIEW: CPT

## 2020-01-25 PROCEDURE — 25010000002 HYDROMORPHONE PER 4 MG: Performed by: NURSE ANESTHETIST, CERTIFIED REGISTERED

## 2020-01-25 PROCEDURE — 25010000003 CEFAZOLIN IN DEXTROSE 2-4 GM/100ML-% SOLUTION: Performed by: ORTHOPAEDIC SURGERY

## 2020-01-25 PROCEDURE — 85014 HEMATOCRIT: CPT | Performed by: ORTHOPAEDIC SURGERY

## 2020-01-25 PROCEDURE — 85018 HEMOGLOBIN: CPT | Performed by: ORTHOPAEDIC SURGERY

## 2020-01-25 PROCEDURE — 72170 X-RAY EXAM OF PELVIS: CPT

## 2020-01-25 PROCEDURE — 25010000002 PHENYLEPHRINE PER 1 ML: Performed by: NURSE ANESTHETIST, CERTIFIED REGISTERED

## 2020-01-25 DEVICE — ACCORD STANDARD 195MM 8 CABLE                                    TROCHANTERIC GRIP
Type: IMPLANTABLE DEVICE | Status: FUNCTIONAL
Brand: ACCORD

## 2020-01-25 DEVICE — R3 0 DEGREE XLPE ACETABULAR LINER                                    32MM ID X OD 50MM
Type: IMPLANTABLE DEVICE | Status: FUNCTIONAL
Brand: R3

## 2020-01-25 DEVICE — REFLECTION SPHERICAL HEAD SCREW 35MM
Type: IMPLANTABLE DEVICE | Status: FUNCTIONAL
Brand: REFLECTION

## 2020-01-25 DEVICE — OXINIUM FEMORAL HEAD 12/14 TAPER                                    32MM +0
Type: IMPLANTABLE DEVICE | Status: FUNCTIONAL
Brand: OXINIUM

## 2020-01-25 DEVICE — ACCORD 2.0MM COBALT CHROME CABLE
Type: IMPLANTABLE DEVICE | Status: FUNCTIONAL
Brand: ACCORD

## 2020-01-25 DEVICE — REDAPT 240MM SLEEVELESS REVISION                                    STEM SIZE 15 STANDARD OFFSET
Type: IMPLANTABLE DEVICE | Status: FUNCTIONAL
Brand: REDAPT

## 2020-01-25 DEVICE — R3 3 HOLE ACETABULAR SHELL 50MM
Type: IMPLANTABLE DEVICE | Status: FUNCTIONAL
Brand: R3 ACETABULAR

## 2020-01-25 DEVICE — REFLECTION SPHERICAL HEAD SCREW 25MM
Type: IMPLANTABLE DEVICE | Status: FUNCTIONAL
Brand: REFLECTION

## 2020-01-25 DEVICE — IMPLANTABLE DEVICE
Type: IMPLANTABLE DEVICE | Status: FUNCTIONAL
Brand: CERAMENT™BONE VOID FILLER

## 2020-01-25 RX ORDER — PROMETHAZINE HYDROCHLORIDE 25 MG/ML
12.5 INJECTION, SOLUTION INTRAMUSCULAR; INTRAVENOUS EVERY 6 HOURS PRN
Status: DISCONTINUED | OUTPATIENT
Start: 2020-01-25 | End: 2020-01-26 | Stop reason: HOSPADM

## 2020-01-25 RX ORDER — NALOXONE HCL 0.4 MG/ML
0.2 VIAL (ML) INJECTION AS NEEDED
Status: DISCONTINUED | OUTPATIENT
Start: 2020-01-25 | End: 2020-01-25 | Stop reason: HOSPADM

## 2020-01-25 RX ORDER — ONDANSETRON 2 MG/ML
4 INJECTION INTRAMUSCULAR; INTRAVENOUS ONCE AS NEEDED
Status: DISCONTINUED | OUTPATIENT
Start: 2020-01-25 | End: 2020-01-25 | Stop reason: HOSPADM

## 2020-01-25 RX ORDER — VANCOMYCIN HYDROCHLORIDE 1 G/200ML
1000 INJECTION, SOLUTION INTRAVENOUS ONCE
Status: COMPLETED | OUTPATIENT
Start: 2020-01-25 | End: 2020-01-25

## 2020-01-25 RX ORDER — FENTANYL CITRATE 50 UG/ML
INJECTION, SOLUTION INTRAMUSCULAR; INTRAVENOUS
Status: COMPLETED
Start: 2020-01-25 | End: 2020-01-25

## 2020-01-25 RX ORDER — HYDRALAZINE HYDROCHLORIDE 20 MG/ML
5 INJECTION INTRAMUSCULAR; INTRAVENOUS
Status: DISCONTINUED | OUTPATIENT
Start: 2020-01-25 | End: 2020-01-25 | Stop reason: HOSPADM

## 2020-01-25 RX ORDER — IPRATROPIUM BROMIDE AND ALBUTEROL SULFATE 2.5; .5 MG/3ML; MG/3ML
3 SOLUTION RESPIRATORY (INHALATION) ONCE AS NEEDED
Status: DISCONTINUED | OUTPATIENT
Start: 2020-01-25 | End: 2020-01-25 | Stop reason: HOSPADM

## 2020-01-25 RX ORDER — LIDOCAINE HYDROCHLORIDE 40 MG/ML
SOLUTION TOPICAL
Status: DISPENSED
Start: 2020-01-25 | End: 2020-01-25

## 2020-01-25 RX ORDER — DIPHENHYDRAMINE HCL 25 MG
25 CAPSULE ORAL
Status: DISCONTINUED | OUTPATIENT
Start: 2020-01-25 | End: 2020-01-25 | Stop reason: HOSPADM

## 2020-01-25 RX ORDER — MELOXICAM 15 MG/1
15 TABLET ORAL DAILY
Status: DISCONTINUED | OUTPATIENT
Start: 2020-01-25 | End: 2020-01-26 | Stop reason: HOSPADM

## 2020-01-25 RX ORDER — ONDANSETRON 2 MG/ML
4 INJECTION INTRAMUSCULAR; INTRAVENOUS EVERY 6 HOURS PRN
Status: DISCONTINUED | OUTPATIENT
Start: 2020-01-25 | End: 2020-01-26 | Stop reason: HOSPADM

## 2020-01-25 RX ORDER — TRANEXAMIC ACID 100 MG/ML
1000 INJECTION, SOLUTION INTRAVENOUS ONCE
Status: DISCONTINUED | OUTPATIENT
Start: 2020-01-25 | End: 2020-01-26 | Stop reason: HOSPADM

## 2020-01-25 RX ORDER — FENTANYL CITRATE 50 UG/ML
INJECTION, SOLUTION INTRAMUSCULAR; INTRAVENOUS AS NEEDED
Status: DISCONTINUED | OUTPATIENT
Start: 2020-01-25 | End: 2020-01-25 | Stop reason: SURG

## 2020-01-25 RX ORDER — ASPIRIN 325 MG
325 TABLET, DELAYED RELEASE (ENTERIC COATED) ORAL DAILY
Status: DISCONTINUED | OUTPATIENT
Start: 2020-01-26 | End: 2020-01-26 | Stop reason: HOSPADM

## 2020-01-25 RX ORDER — PROMETHAZINE HYDROCHLORIDE 25 MG/ML
6.25 INJECTION, SOLUTION INTRAMUSCULAR; INTRAVENOUS
Status: DISCONTINUED | OUTPATIENT
Start: 2020-01-25 | End: 2020-01-25 | Stop reason: HOSPADM

## 2020-01-25 RX ORDER — ONDANSETRON 2 MG/ML
INJECTION INTRAMUSCULAR; INTRAVENOUS AS NEEDED
Status: DISCONTINUED | OUTPATIENT
Start: 2020-01-25 | End: 2020-01-25 | Stop reason: SURG

## 2020-01-25 RX ORDER — SODIUM CHLORIDE 9 MG/ML
100 INJECTION, SOLUTION INTRAVENOUS CONTINUOUS
Status: DISCONTINUED | OUTPATIENT
Start: 2020-01-25 | End: 2020-01-26 | Stop reason: HOSPADM

## 2020-01-25 RX ORDER — HYDROMORPHONE HYDROCHLORIDE 1 MG/ML
0.5 INJECTION, SOLUTION INTRAMUSCULAR; INTRAVENOUS; SUBCUTANEOUS
Status: DISCONTINUED | OUTPATIENT
Start: 2020-01-25 | End: 2020-01-25 | Stop reason: HOSPADM

## 2020-01-25 RX ORDER — SCOLOPAMINE TRANSDERMAL SYSTEM 1 MG/1
1 PATCH, EXTENDED RELEASE TRANSDERMAL
Status: DISCONTINUED | OUTPATIENT
Start: 2020-01-25 | End: 2020-01-26 | Stop reason: HOSPADM

## 2020-01-25 RX ORDER — FLUMAZENIL 0.1 MG/ML
0.2 INJECTION INTRAVENOUS AS NEEDED
Status: DISCONTINUED | OUTPATIENT
Start: 2020-01-25 | End: 2020-01-25 | Stop reason: HOSPADM

## 2020-01-25 RX ORDER — HYDROMORPHONE HCL 110MG/55ML
PATIENT CONTROLLED ANALGESIA SYRINGE INTRAVENOUS AS NEEDED
Status: DISCONTINUED | OUTPATIENT
Start: 2020-01-25 | End: 2020-01-25 | Stop reason: SURG

## 2020-01-25 RX ORDER — HYDROCODONE BITARTRATE AND ACETAMINOPHEN 5; 325 MG/1; MG/1
2 TABLET ORAL EVERY 4 HOURS PRN
Status: DISCONTINUED | OUTPATIENT
Start: 2020-01-25 | End: 2020-01-26 | Stop reason: HOSPADM

## 2020-01-25 RX ORDER — ACETAMINOPHEN 650 MG/1
650 SUPPOSITORY RECTAL EVERY 4 HOURS PRN
Status: DISCONTINUED | OUTPATIENT
Start: 2020-01-25 | End: 2020-01-26 | Stop reason: HOSPADM

## 2020-01-25 RX ORDER — LIDOCAINE HYDROCHLORIDE 20 MG/ML
INJECTION, SOLUTION INFILTRATION; PERINEURAL AS NEEDED
Status: DISCONTINUED | OUTPATIENT
Start: 2020-01-25 | End: 2020-01-25 | Stop reason: SURG

## 2020-01-25 RX ORDER — MONTELUKAST SODIUM 10 MG/1
10 TABLET ORAL DAILY
Status: DISCONTINUED | OUTPATIENT
Start: 2020-01-25 | End: 2020-01-26 | Stop reason: HOSPADM

## 2020-01-25 RX ORDER — FAMOTIDINE 10 MG/ML
20 INJECTION, SOLUTION INTRAVENOUS ONCE
Status: COMPLETED | OUTPATIENT
Start: 2020-01-25 | End: 2020-01-25

## 2020-01-25 RX ORDER — FENTANYL CITRATE 50 UG/ML
50 INJECTION, SOLUTION INTRAMUSCULAR; INTRAVENOUS
Status: DISCONTINUED | OUTPATIENT
Start: 2020-01-25 | End: 2020-01-25 | Stop reason: HOSPADM

## 2020-01-25 RX ORDER — SODIUM CHLORIDE, SODIUM LACTATE, POTASSIUM CHLORIDE, CALCIUM CHLORIDE 600; 310; 30; 20 MG/100ML; MG/100ML; MG/100ML; MG/100ML
100 INJECTION, SOLUTION INTRAVENOUS CONTINUOUS
Status: DISCONTINUED | OUTPATIENT
Start: 2020-01-25 | End: 2020-01-26 | Stop reason: HOSPADM

## 2020-01-25 RX ORDER — VANCOMYCIN HYDROCHLORIDE 1 G/200ML
15 INJECTION, SOLUTION INTRAVENOUS EVERY 12 HOURS
Status: COMPLETED | OUTPATIENT
Start: 2020-01-25 | End: 2020-01-26

## 2020-01-25 RX ORDER — ONDANSETRON 4 MG/1
4 TABLET, FILM COATED ORAL EVERY 6 HOURS PRN
Status: DISCONTINUED | OUTPATIENT
Start: 2020-01-25 | End: 2020-01-26 | Stop reason: HOSPADM

## 2020-01-25 RX ORDER — MIDAZOLAM HYDROCHLORIDE 1 MG/ML
2 INJECTION INTRAMUSCULAR; INTRAVENOUS
Status: DISCONTINUED | OUTPATIENT
Start: 2020-01-25 | End: 2020-01-25 | Stop reason: HOSPADM

## 2020-01-25 RX ORDER — SODIUM CHLORIDE, SODIUM LACTATE, POTASSIUM CHLORIDE, CALCIUM CHLORIDE 600; 310; 30; 20 MG/100ML; MG/100ML; MG/100ML; MG/100ML
9 INJECTION, SOLUTION INTRAVENOUS CONTINUOUS
Status: DISCONTINUED | OUTPATIENT
Start: 2020-01-25 | End: 2020-01-26 | Stop reason: HOSPADM

## 2020-01-25 RX ORDER — DOCUSATE SODIUM 100 MG/1
100 CAPSULE, LIQUID FILLED ORAL 2 TIMES DAILY PRN
Status: DISCONTINUED | OUTPATIENT
Start: 2020-01-25 | End: 2020-01-26 | Stop reason: HOSPADM

## 2020-01-25 RX ORDER — PROMETHAZINE HYDROCHLORIDE 25 MG/1
25 TABLET ORAL ONCE AS NEEDED
Status: DISCONTINUED | OUTPATIENT
Start: 2020-01-25 | End: 2020-01-25 | Stop reason: HOSPADM

## 2020-01-25 RX ORDER — SODIUM CHLORIDE 0.9 % (FLUSH) 0.9 %
3-10 SYRINGE (ML) INJECTION AS NEEDED
Status: DISCONTINUED | OUTPATIENT
Start: 2020-01-25 | End: 2020-01-25 | Stop reason: HOSPADM

## 2020-01-25 RX ORDER — CEFAZOLIN SODIUM 2 G/100ML
2 INJECTION, SOLUTION INTRAVENOUS ONCE
Status: COMPLETED | OUTPATIENT
Start: 2020-01-25 | End: 2020-01-25

## 2020-01-25 RX ORDER — PROMETHAZINE HYDROCHLORIDE 12.5 MG/1
12.5 TABLET ORAL EVERY 6 HOURS PRN
Status: DISCONTINUED | OUTPATIENT
Start: 2020-01-25 | End: 2020-01-26 | Stop reason: HOSPADM

## 2020-01-25 RX ORDER — TRANEXAMIC ACID 100 MG/ML
INJECTION, SOLUTION INTRAVENOUS AS NEEDED
Status: DISCONTINUED | OUTPATIENT
Start: 2020-01-25 | End: 2020-01-25 | Stop reason: SURG

## 2020-01-25 RX ORDER — PROMETHAZINE HYDROCHLORIDE 25 MG/ML
12.5 INJECTION, SOLUTION INTRAMUSCULAR; INTRAVENOUS ONCE AS NEEDED
Status: DISCONTINUED | OUTPATIENT
Start: 2020-01-25 | End: 2020-01-25 | Stop reason: HOSPADM

## 2020-01-25 RX ORDER — LIDOCAINE HYDROCHLORIDE 10 MG/ML
0.5 INJECTION, SOLUTION EPIDURAL; INFILTRATION; INTRACAUDAL; PERINEURAL ONCE AS NEEDED
Status: DISCONTINUED | OUTPATIENT
Start: 2020-01-25 | End: 2020-01-25 | Stop reason: HOSPADM

## 2020-01-25 RX ORDER — ATENOLOL 25 MG/1
25 TABLET ORAL DAILY
Status: DISCONTINUED | OUTPATIENT
Start: 2020-01-25 | End: 2020-01-26 | Stop reason: HOSPADM

## 2020-01-25 RX ORDER — OXYCODONE AND ACETAMINOPHEN 7.5; 325 MG/1; MG/1
1 TABLET ORAL ONCE AS NEEDED
Status: DISCONTINUED | OUTPATIENT
Start: 2020-01-25 | End: 2020-01-25 | Stop reason: HOSPADM

## 2020-01-25 RX ORDER — MIDAZOLAM HYDROCHLORIDE 1 MG/ML
1 INJECTION INTRAMUSCULAR; INTRAVENOUS
Status: DISCONTINUED | OUTPATIENT
Start: 2020-01-25 | End: 2020-01-25 | Stop reason: HOSPADM

## 2020-01-25 RX ORDER — SODIUM CHLORIDE 0.9 % (FLUSH) 0.9 %
3 SYRINGE (ML) INJECTION EVERY 12 HOURS SCHEDULED
Status: DISCONTINUED | OUTPATIENT
Start: 2020-01-25 | End: 2020-01-25 | Stop reason: HOSPADM

## 2020-01-25 RX ORDER — VANCOMYCIN HYDROCHLORIDE 1 G/20ML
INJECTION, POWDER, LYOPHILIZED, FOR SOLUTION INTRAVENOUS AS NEEDED
Status: DISCONTINUED | OUTPATIENT
Start: 2020-01-25 | End: 2020-01-25 | Stop reason: HOSPADM

## 2020-01-25 RX ORDER — ACETAMINOPHEN 500 MG
1000 TABLET ORAL ONCE
Status: COMPLETED | OUTPATIENT
Start: 2020-01-25 | End: 2020-01-25

## 2020-01-25 RX ORDER — MEPERIDINE HYDROCHLORIDE 25 MG/ML
12.5 INJECTION INTRAMUSCULAR; INTRAVENOUS; SUBCUTANEOUS
Status: DISCONTINUED | OUTPATIENT
Start: 2020-01-25 | End: 2020-01-25 | Stop reason: HOSPADM

## 2020-01-25 RX ORDER — DEXAMETHASONE SODIUM PHOSPHATE 10 MG/ML
INJECTION INTRAMUSCULAR; INTRAVENOUS AS NEEDED
Status: DISCONTINUED | OUTPATIENT
Start: 2020-01-25 | End: 2020-01-25 | Stop reason: SURG

## 2020-01-25 RX ORDER — KETOROLAC TROMETHAMINE 30 MG/ML
INJECTION, SOLUTION INTRAMUSCULAR; INTRAVENOUS AS NEEDED
Status: DISCONTINUED | OUTPATIENT
Start: 2020-01-25 | End: 2020-01-25 | Stop reason: SURG

## 2020-01-25 RX ORDER — ACETAMINOPHEN 325 MG/1
650 TABLET ORAL EVERY 4 HOURS PRN
Status: DISCONTINUED | OUTPATIENT
Start: 2020-01-25 | End: 2020-01-26 | Stop reason: HOSPADM

## 2020-01-25 RX ORDER — HYDROCODONE BITARTRATE AND ACETAMINOPHEN 5; 325 MG/1; MG/1
1 TABLET ORAL EVERY 4 HOURS PRN
Status: DISCONTINUED | OUTPATIENT
Start: 2020-01-25 | End: 2020-01-26 | Stop reason: HOSPADM

## 2020-01-25 RX ORDER — BISACODYL 10 MG
10 SUPPOSITORY, RECTAL RECTAL DAILY PRN
Status: DISCONTINUED | OUTPATIENT
Start: 2020-01-25 | End: 2020-01-26 | Stop reason: HOSPADM

## 2020-01-25 RX ORDER — CELECOXIB 200 MG/1
200 CAPSULE ORAL ONCE
Status: DISCONTINUED | OUTPATIENT
Start: 2020-01-25 | End: 2020-01-25 | Stop reason: HOSPADM

## 2020-01-25 RX ORDER — LEFLUNOMIDE 20 MG/1
20 TABLET ORAL DAILY
Status: DISCONTINUED | OUTPATIENT
Start: 2020-01-25 | End: 2020-01-26 | Stop reason: HOSPADM

## 2020-01-25 RX ORDER — PROMETHAZINE HYDROCHLORIDE 25 MG/1
25 SUPPOSITORY RECTAL ONCE AS NEEDED
Status: DISCONTINUED | OUTPATIENT
Start: 2020-01-25 | End: 2020-01-25 | Stop reason: HOSPADM

## 2020-01-25 RX ORDER — DIPHENHYDRAMINE HYDROCHLORIDE 50 MG/ML
12.5 INJECTION INTRAMUSCULAR; INTRAVENOUS
Status: DISCONTINUED | OUTPATIENT
Start: 2020-01-25 | End: 2020-01-25 | Stop reason: HOSPADM

## 2020-01-25 RX ORDER — PROPOFOL 10 MG/ML
VIAL (ML) INTRAVENOUS AS NEEDED
Status: DISCONTINUED | OUTPATIENT
Start: 2020-01-25 | End: 2020-01-25 | Stop reason: SURG

## 2020-01-25 RX ORDER — HYDROCODONE BITARTRATE AND ACETAMINOPHEN 7.5; 325 MG/1; MG/1
1 TABLET ORAL ONCE AS NEEDED
Status: DISCONTINUED | OUTPATIENT
Start: 2020-01-25 | End: 2020-01-25 | Stop reason: HOSPADM

## 2020-01-25 RX ORDER — ROCURONIUM BROMIDE 10 MG/ML
INJECTION, SOLUTION INTRAVENOUS AS NEEDED
Status: DISCONTINUED | OUTPATIENT
Start: 2020-01-25 | End: 2020-01-25 | Stop reason: SURG

## 2020-01-25 RX ORDER — FAMOTIDINE 40 MG/1
40 TABLET, FILM COATED ORAL DAILY
Status: DISCONTINUED | OUTPATIENT
Start: 2020-01-25 | End: 2020-01-26 | Stop reason: HOSPADM

## 2020-01-25 RX ORDER — NALOXONE HCL 0.4 MG/ML
0.1 VIAL (ML) INJECTION
Status: DISCONTINUED | OUTPATIENT
Start: 2020-01-25 | End: 2020-01-26 | Stop reason: HOSPADM

## 2020-01-25 RX ORDER — SENNA AND DOCUSATE SODIUM 50; 8.6 MG/1; MG/1
2 TABLET, FILM COATED ORAL NIGHTLY
Status: DISCONTINUED | OUTPATIENT
Start: 2020-01-25 | End: 2020-01-26 | Stop reason: HOSPADM

## 2020-01-25 RX ORDER — FENTANYL CITRATE 50 UG/ML
INJECTION, SOLUTION INTRAMUSCULAR; INTRAVENOUS
Status: DISPENSED
Start: 2020-01-25 | End: 2020-01-25

## 2020-01-25 RX ORDER — EPHEDRINE SULFATE 50 MG/ML
5 INJECTION, SOLUTION INTRAVENOUS ONCE AS NEEDED
Status: DISCONTINUED | OUTPATIENT
Start: 2020-01-25 | End: 2020-01-25 | Stop reason: HOSPADM

## 2020-01-25 RX ORDER — ACETAMINOPHEN 325 MG/1
650 TABLET ORAL ONCE AS NEEDED
Status: DISCONTINUED | OUTPATIENT
Start: 2020-01-25 | End: 2020-01-25 | Stop reason: HOSPADM

## 2020-01-25 RX ORDER — CEFAZOLIN SODIUM 2 G/100ML
2 INJECTION, SOLUTION INTRAVENOUS EVERY 8 HOURS
Status: COMPLETED | OUTPATIENT
Start: 2020-01-25 | End: 2020-01-26

## 2020-01-25 RX ORDER — OXYCODONE HYDROCHLORIDE 5 MG/1
5 TABLET ORAL ONCE
Status: COMPLETED | OUTPATIENT
Start: 2020-01-25 | End: 2020-01-25

## 2020-01-25 RX ADMIN — PROPOFOL 100 MG: 10 INJECTION, EMULSION INTRAVENOUS at 07:49

## 2020-01-25 RX ADMIN — ROCURONIUM BROMIDE 10 MG: 10 INJECTION INTRAVENOUS at 10:50

## 2020-01-25 RX ADMIN — TRANEXAMIC ACID 1000 MG: 100 INJECTION, SOLUTION INTRAVENOUS at 08:12

## 2020-01-25 RX ADMIN — LIDOCAINE HYDROCHLORIDE 50 MG: 20 INJECTION, SOLUTION INFILTRATION; PERINEURAL at 07:49

## 2020-01-25 RX ADMIN — FENTANYL CITRATE 25 MCG: 50 INJECTION INTRAMUSCULAR; INTRAVENOUS at 09:54

## 2020-01-25 RX ADMIN — CEFAZOLIN SODIUM 2 G: 2 INJECTION, SOLUTION INTRAVENOUS at 07:44

## 2020-01-25 RX ADMIN — HYDROMORPHONE HYDROCHLORIDE 0.5 MG: 2 INJECTION, SOLUTION INTRAMUSCULAR; INTRAVENOUS; SUBCUTANEOUS at 11:09

## 2020-01-25 RX ADMIN — ROCURONIUM BROMIDE 10 MG: 10 INJECTION INTRAVENOUS at 11:11

## 2020-01-25 RX ADMIN — PHENYLEPHRINE HYDROCHLORIDE 50 MCG: 10 INJECTION INTRAVENOUS at 08:29

## 2020-01-25 RX ADMIN — MIDAZOLAM 1 MG: 1 INJECTION INTRAMUSCULAR; INTRAVENOUS at 07:16

## 2020-01-25 RX ADMIN — SODIUM CHLORIDE, POTASSIUM CHLORIDE, SODIUM LACTATE AND CALCIUM CHLORIDE: 600; 310; 30; 20 INJECTION, SOLUTION INTRAVENOUS at 10:10

## 2020-01-25 RX ADMIN — SODIUM CHLORIDE, POTASSIUM CHLORIDE, SODIUM LACTATE AND CALCIUM CHLORIDE 100 ML/HR: 600; 310; 30; 20 INJECTION, SOLUTION INTRAVENOUS at 12:47

## 2020-01-25 RX ADMIN — FAMOTIDINE 20 MG: 10 INJECTION INTRAVENOUS at 07:15

## 2020-01-25 RX ADMIN — SODIUM CHLORIDE, POTASSIUM CHLORIDE, SODIUM LACTATE AND CALCIUM CHLORIDE 9 ML/HR: 600; 310; 30; 20 INJECTION, SOLUTION INTRAVENOUS at 06:06

## 2020-01-25 RX ADMIN — ROCURONIUM BROMIDE 10 MG: 10 INJECTION INTRAVENOUS at 09:10

## 2020-01-25 RX ADMIN — PHENYLEPHRINE HYDROCHLORIDE 50 MCG: 10 INJECTION INTRAVENOUS at 08:23

## 2020-01-25 RX ADMIN — SUGAMMADEX 200 MG: 100 INJECTION, SOLUTION INTRAVENOUS at 11:44

## 2020-01-25 RX ADMIN — FENTANYL CITRATE 25 MCG: 50 INJECTION INTRAMUSCULAR; INTRAVENOUS at 08:51

## 2020-01-25 RX ADMIN — ROCURONIUM BROMIDE 40 MG: 10 INJECTION INTRAVENOUS at 07:49

## 2020-01-25 RX ADMIN — ACETAMINOPHEN 1000 MG: 500 TABLET, FILM COATED ORAL at 06:19

## 2020-01-25 RX ADMIN — FENTANYL CITRATE 50 MCG: 50 INJECTION INTRAMUSCULAR; INTRAVENOUS at 07:44

## 2020-01-25 RX ADMIN — FENTANYL CITRATE 50 MCG: 50 INJECTION INTRAMUSCULAR; INTRAVENOUS at 08:14

## 2020-01-25 RX ADMIN — HYDROCODONE BITARTRATE AND ACETAMINOPHEN 1 TABLET: 5; 325 TABLET ORAL at 21:43

## 2020-01-25 RX ADMIN — HYDROMORPHONE HYDROCHLORIDE 0.5 MG: 2 INJECTION, SOLUTION INTRAMUSCULAR; INTRAVENOUS; SUBCUTANEOUS at 10:49

## 2020-01-25 RX ADMIN — CEFAZOLIN SODIUM 2 G: 2 INJECTION, SOLUTION INTRAVENOUS at 20:42

## 2020-01-25 RX ADMIN — VANCOMYCIN HYDROCHLORIDE 1000 MG: 1 INJECTION, SOLUTION INTRAVENOUS at 18:03

## 2020-01-25 RX ADMIN — SENNOSIDES AND DOCUSATE SODIUM 2 TABLET: 8.6; 5 TABLET ORAL at 20:42

## 2020-01-25 RX ADMIN — OXYCODONE 5 MG: 5 TABLET ORAL at 06:19

## 2020-01-25 RX ADMIN — KETOROLAC TROMETHAMINE 30 MG: 30 INJECTION, SOLUTION INTRAMUSCULAR; INTRAVENOUS at 11:44

## 2020-01-25 RX ADMIN — HYDROMORPHONE HYDROCHLORIDE 0.5 MG: 2 INJECTION, SOLUTION INTRAMUSCULAR; INTRAVENOUS; SUBCUTANEOUS at 11:42

## 2020-01-25 RX ADMIN — SCOPALAMINE 1 PATCH: 1 PATCH, EXTENDED RELEASE TRANSDERMAL at 20:42

## 2020-01-25 RX ADMIN — ONDANSETRON 4 MG: 2 INJECTION INTRAMUSCULAR; INTRAVENOUS at 14:26

## 2020-01-25 RX ADMIN — FENTANYL CITRATE 25 MCG: 50 INJECTION INTRAMUSCULAR; INTRAVENOUS at 09:48

## 2020-01-25 RX ADMIN — PROMETHAZINE HYDROCHLORIDE 12.5 MG: 25 INJECTION INTRAMUSCULAR; INTRAVENOUS at 18:13

## 2020-01-25 RX ADMIN — PHENYLEPHRINE HYDROCHLORIDE 100 MCG: 10 INJECTION INTRAVENOUS at 08:35

## 2020-01-25 RX ADMIN — FENTANYL CITRATE 25 MCG: 50 INJECTION INTRAMUSCULAR; INTRAVENOUS at 09:12

## 2020-01-25 RX ADMIN — ROCURONIUM BROMIDE 10 MG: 10 INJECTION INTRAVENOUS at 09:48

## 2020-01-25 RX ADMIN — HYDROMORPHONE HYDROCHLORIDE 0.5 MG: 2 INJECTION, SOLUTION INTRAMUSCULAR; INTRAVENOUS; SUBCUTANEOUS at 12:03

## 2020-01-25 RX ADMIN — MELOXICAM 15 MG: 7.5 TABLET ORAL at 16:31

## 2020-01-25 RX ADMIN — DEXAMETHASONE SODIUM PHOSPHATE 6 MG: 10 INJECTION INTRAMUSCULAR; INTRAVENOUS at 07:44

## 2020-01-25 RX ADMIN — ROCURONIUM BROMIDE 10 MG: 10 INJECTION INTRAVENOUS at 08:12

## 2020-01-25 RX ADMIN — VANCOMYCIN HYDROCHLORIDE 1000 MG: 1 INJECTION, SOLUTION INTRAVENOUS at 06:19

## 2020-01-25 RX ADMIN — CEFAZOLIN SODIUM 2 G: 2 INJECTION, SOLUTION INTRAVENOUS at 11:44

## 2020-01-25 RX ADMIN — ONDANSETRON HYDROCHLORIDE 4 MG: 2 SOLUTION INTRAMUSCULAR; INTRAVENOUS at 11:44

## 2020-01-25 NOTE — ANESTHESIA PREPROCEDURE EVALUATION
Anesthesia Evaluation     Patient summary reviewed and Nursing notes reviewed   NPO Solid Status: > 8 hours             Airway   Mallampati: I  TM distance: >3 FB  Neck ROM: full  No difficulty expected  Dental - normal exam     Pulmonary - negative pulmonary ROS and normal exam   Cardiovascular - normal exam    (+) hypertension,       Neuro/Psych- negative ROS  GI/Hepatic/Renal/Endo - negative ROS     Musculoskeletal     Abdominal  - normal exam    Bowel sounds: normal.   Substance History - negative use     OB/GYN negative ob/gyn ROS         Other   arthritis,    history of cancer      Other Comment: Rheumatoid arthritis                  Anesthesia Plan    ASA 3     general       Anesthetic plan, all risks, benefits, and alternatives have been provided, discussed and informed consent has been obtained with: patient.

## 2020-01-25 NOTE — ANESTHESIA PROCEDURE NOTES
Airway  Urgency: elective    Date/Time: 1/25/2020 7:52 AM  Airway not difficult    General Information and Staff    Patient location during procedure: OR  Anesthesiologist: Cuco Cat MD  CRNA: Bárbara Sanchez CRNA    Indications and Patient Condition  Indications for airway management: airway protection    Preoxygenated: yes  MILS maintained throughout  Mask difficulty assessment: 2 - vent by mask + OA or adjuvant +/- NMBA    Final Airway Details  Final airway type: endotracheal airway      Successful airway: ETT  Cuffed: yes   Successful intubation technique: direct laryngoscopy  Facilitating devices/methods: intubating stylet  Endotracheal tube insertion site: oral  Blade: Carroll  Blade size: 3  ETT size (mm): 7.0  Cormack-Lehane Classification: grade IIa - partial view of glottis  Placement verified by: chest auscultation and capnometry   Cuff volume (mL): 4  Measured from: lips  ETT/EBT  to lips (cm): 20  Number of attempts at approach: 1  Assessment: lips, teeth, and gum same as pre-op and atraumatic intubation    Additional Comments  Atraumatic ET Tube placement.  Teeth as pre-op. BLEBS.  -ABD sounds.  +ET CO2.  Secured to face

## 2020-01-25 NOTE — ANESTHESIA POSTPROCEDURE EVALUATION
"Patient: Bonnie Syed    Procedure Summary     Date:  01/25/20 Room / Location:  [facility] OR [address] [place] [address] MAIN OR    Anesthesia Start:  0744 Anesthesia Stop:  1208    Procedure:  left TOTAL HIP arthroplasty, orif femur and removal of hardware (Left Hip) Diagnosis:      Surgeon:  Samy Altamirano II, MD Provider:  Cuco Cat MD    Anesthesia Type:  general ASA Status:  3          Anesthesia Type: general    Vitals  Vitals Value Taken Time   /86 1/25/2020 12:30 PM   Temp     Pulse 97 1/25/2020 12:33 PM   Resp     SpO2 96 % 1/25/2020 12:33 PM   Vitals shown include unvalidated device data.        Post Anesthesia Care and Evaluation    Patient location during evaluation: bedside  Patient participation: complete - patient participated  Level of consciousness: awake and alert  Pain score: 0  Pain management: adequate  Airway patency: patent  Anesthetic complications: No anesthetic complications  PONV Status: none  Cardiovascular status: acceptable and hemodynamically stable  Respiratory status: acceptable, nasal cannula and spontaneous ventilation  Hydration status: acceptable    Comments: /94 (BP Location: Right arm, Patient Position: Lying)   Pulse 87   Temp 36.6 °C (97.8 °F) (Oral)   Resp 16   Ht 162.6 cm (64\")   Wt 61 kg (134 lb 7 oz)   SpO2 95% Comment: POST SEDATION   BMI 23.08 kg/m²         "

## 2020-01-26 VITALS
WEIGHT: 134.44 LBS | HEIGHT: 64 IN | BODY MASS INDEX: 22.95 KG/M2 | RESPIRATION RATE: 16 BRPM | DIASTOLIC BLOOD PRESSURE: 63 MMHG | SYSTOLIC BLOOD PRESSURE: 107 MMHG | OXYGEN SATURATION: 95 % | HEART RATE: 83 BPM | TEMPERATURE: 97.8 F

## 2020-01-26 LAB
ANION GAP SERPL CALCULATED.3IONS-SCNC: 12 MMOL/L (ref 5–15)
BUN BLD-MCNC: 15 MG/DL (ref 6–20)
BUN/CREAT SERPL: 17.9 (ref 7–25)
CALCIUM SPEC-SCNC: 8.4 MG/DL (ref 8.6–10.5)
CHLORIDE SERPL-SCNC: 103 MMOL/L (ref 98–107)
CO2 SERPL-SCNC: 22 MMOL/L (ref 22–29)
CREAT BLD-MCNC: 0.84 MG/DL (ref 0.57–1)
GFR SERPL CREATININE-BSD FRML MDRD: 69 ML/MIN/1.73
GLUCOSE BLD-MCNC: 112 MG/DL (ref 65–99)
HCT VFR BLD AUTO: 27.8 % (ref 34–46.6)
HGB BLD-MCNC: 9.5 G/DL (ref 12–15.9)
POTASSIUM BLD-SCNC: 3.9 MMOL/L (ref 3.5–5.2)
SODIUM BLD-SCNC: 137 MMOL/L (ref 136–145)

## 2020-01-26 PROCEDURE — 25010000003 CEFAZOLIN IN DEXTROSE 2-4 GM/100ML-% SOLUTION: Performed by: ORTHOPAEDIC SURGERY

## 2020-01-26 PROCEDURE — 80048 BASIC METABOLIC PNL TOTAL CA: CPT | Performed by: ORTHOPAEDIC SURGERY

## 2020-01-26 PROCEDURE — 97110 THERAPEUTIC EXERCISES: CPT | Performed by: PHYSICAL THERAPIST

## 2020-01-26 PROCEDURE — 85018 HEMOGLOBIN: CPT | Performed by: ORTHOPAEDIC SURGERY

## 2020-01-26 PROCEDURE — 25010000002 VANCOMYCIN PER 500 MG: Performed by: ORTHOPAEDIC SURGERY

## 2020-01-26 PROCEDURE — 97161 PT EVAL LOW COMPLEX 20 MIN: CPT | Performed by: PHYSICAL THERAPIST

## 2020-01-26 PROCEDURE — 85014 HEMATOCRIT: CPT | Performed by: ORTHOPAEDIC SURGERY

## 2020-01-26 RX ORDER — HYDROCODONE BITARTRATE AND ACETAMINOPHEN 5; 325 MG/1; MG/1
1 TABLET ORAL EVERY 4 HOURS PRN
Qty: 40 TABLET | Refills: 0 | Status: SHIPPED | OUTPATIENT
Start: 2020-01-26 | End: 2020-02-04

## 2020-01-26 RX ADMIN — CEFAZOLIN SODIUM 2 G: 2 INJECTION, SOLUTION INTRAVENOUS at 03:06

## 2020-01-26 RX ADMIN — HYDROCODONE BITARTRATE AND ACETAMINOPHEN 2 TABLET: 5; 325 TABLET ORAL at 11:10

## 2020-01-26 RX ADMIN — VANCOMYCIN HYDROCHLORIDE 1000 MG: 1 INJECTION, SOLUTION INTRAVENOUS at 06:19

## 2020-01-26 RX ADMIN — MONTELUKAST SODIUM 10 MG: 10 TABLET, FILM COATED ORAL at 08:21

## 2020-01-26 RX ADMIN — ATENOLOL 25 MG: 25 TABLET ORAL at 08:21

## 2020-01-26 RX ADMIN — HYDROCODONE BITARTRATE AND ACETAMINOPHEN 2 TABLET: 5; 325 TABLET ORAL at 03:06

## 2020-01-26 RX ADMIN — MELOXICAM 15 MG: 7.5 TABLET ORAL at 08:21

## 2020-01-26 RX ADMIN — ASPIRIN 325 MG: 325 TABLET, COATED ORAL at 08:21

## 2020-01-26 RX ADMIN — HYDROCODONE BITARTRATE AND ACETAMINOPHEN 2 TABLET: 5; 325 TABLET ORAL at 07:07

## 2020-01-26 RX ADMIN — LEFLUNOMIDE 20 MG: 20 TABLET ORAL at 08:21

## 2020-01-27 ENCOUNTER — READMISSION MANAGEMENT (OUTPATIENT)
Dept: CALL CENTER | Facility: HOSPITAL | Age: 60
End: 2020-01-27

## 2020-01-27 NOTE — OUTREACH NOTE
Prep Survey      Responses   Facility patient discharged from?  [facility]   Is patient eligible?  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  Total Joint Replacement   Does the patient have Home health ordered?  Yes   What is the Home health agency?   [facility]    Is there a DME ordered?  No   Medication alerts for this patient  ASA    Prep survey completed?  Yes          Kimberly Gillis RN

## 2020-01-28 ENCOUNTER — READMISSION MANAGEMENT (OUTPATIENT)
Dept: CALL CENTER | Facility: HOSPITAL | Age: 60
End: 2020-01-28

## 2020-01-28 NOTE — OUTREACH NOTE
Total Joint Week 1 Survey      Responses   Facility patient discharged from?  [facility]   Does the patient have one of the following disease processes/diagnoses(primary or secondary)?  Total Joint Replacement   Is there a successful TCM telephone encounter documented?  No   Joint surgery performed?  Hip   Week 1 attempt successful?  Yes   Call start time  1716   Call end time  1727   Discharge diagnosis  : Status post total replacement of hip    Does the patient have all medications related to this admission filled (includes all antibiotics, pain medications, etc.)  Yes   Prescription comments  having nausea from Norco, has stopped Norco, taking Tylenol and prescrip Phenergan given   Is the patient taking all medications as directed (includes completed medication regime)?  Yes   Is the patient able to teach back alternate methods of pain control?  Ice, Correct alignment, Reposition   Comments regarding appointments  Feb 10 with Dr Altamirano   Does the patient have a follow up appointment with their surgeon?  Yes   Has the patient kept scheduled appointments due by today?  N/A   What is the Home health agency?   [facility]    Has home health visited the patient within 72 hours of discharge?  N/A   Psychosocial issues?  No   When is the first therapy visit scheduled (PO Day) including how many days per week   2 days after d/c   Has the patient began therapy sessions (either in the home or as an out patient)?  Yes   Does the patient have a wound vac in place?  Yes   Has the patient fallen since discharge?  No   Did the patient receive a copy of their discharge instructions?  Yes   Nursing interventions  Reviewed instructions with patient   What is the patient's perception of their functional status since discharge?  Improving   Is the patient able to teach back signs and symptoms of infection?  Temp >100.4 for 24h or longer, Blisters around incision, Increased swelling or redness around incision (not associated with surgical  edema), Severe discomfort or pain, Changes in mobility, Shortness of breath or chest pain, Incisional drainage   Is the patient able to teach back how to prevent infection?  Check incision daily, Wash hands before and after touching incision, Keep incision covered if drainage, Keep incision covered if pets in house, Shower only as directed by surgeon, Monitor blood sugar if diabetic, Eat well-balanced diet, No tub baths, hot tub or swimming, No lotion or creams   Is the patient able to teach back signs and symptoms of DVT?  Shortness of breath or chest pain, Area hot to touch, Redness in calf, Swelling in calf, Severe pain in calf   Is the patient able to teach back home safety measures?  Ability to shower, Accessibility to necessary areas in home, Modifications to reach items, Modifications with ADLs such as dressing, cooking, toileting   Did the patient implement home safety suggestions from pre-surgery classes if attended?  N/A   Is the patient/caregiver able to teach back the hierarchy of who to call/visit for symptoms/problems? PCP, Specialist, Home health nurse, Urgent Care, ED, 911  Yes   Additional teach back comments  discussed methods to relieve nausea, crackers, meds with food, etc, pt states is hoping new Phenergan prescrip will solve problem   Week 1 call completed?  Yes          Corie Arrington RN

## 2020-02-06 ENCOUNTER — READMISSION MANAGEMENT (OUTPATIENT)
Dept: CALL CENTER | Facility: HOSPITAL | Age: 60
End: 2020-02-06

## 2020-02-06 NOTE — OUTREACH NOTE
Total Joint Week 2 Survey      Responses   Facility patient discharged from?  [facility]   Does the patient have one of the following disease processes/diagnoses(primary or secondary)?  Total Joint Replacement   Joint surgery performed?  Hip   Week 2 attempt successful?  Yes   Call start time  1634   Call end time  1638   Has the patient been back in either the hospital or Emergency Department since discharge?  No   Discharge diagnosis  : Status post total replacement of hip    Does the patient have a follow up appointment with their surgeon?  Yes [Surgeon on monday]   Has the patient kept scheduled appointments due by today?  N/A   What is the Home health agency?    [facility]- they are finished as of this week.   Has the patient began therapy sessions (either in the home or as an out patient)?  Yes   If the patient has started attending therapy, what post op day did they begin to attend (either in home or as an out patient)?    in home.  Pt reports she expects to start outpt PT following appointment with surgeon on monday.   Has the patient fallen since discharge?  No   Comments  Pt reports she plans to see [facility].  she will check with surgeon    Did the patient receive a copy of their discharge instructions?  Yes   Nursing interventions  Reviewed instructions with patient   What is the patient's perception of their functional status since discharge?  Improving   Additional teach back comments  Pt reports she loves the [facility] provided at discharge.    Week 2 call completed?  Yes   Wrap up additional comments  Pt denies any needs at this time.  She reports she feels she is doing well to be 2 weeks postop.          Winifred Bellamy RN

## 2020-02-20 ENCOUNTER — READMISSION MANAGEMENT (OUTPATIENT)
Dept: CALL CENTER | Facility: HOSPITAL | Age: 60
End: 2020-02-20

## 2020-02-20 NOTE — OUTREACH NOTE
Total Joint Month 1 Survey      Responses   Facility patient discharged from?  [facility]   Does the patient have one of the following disease processes/diagnoses(primary or secondary)?  Total Joint Replacement   Joint surgery performed?  Hip   Month 1 attempt successful?  No   Unsuccessful attempts  Attempt 1          Yee Hassan RN

## 2020-02-21 ENCOUNTER — READMISSION MANAGEMENT (OUTPATIENT)
Dept: CALL CENTER | Facility: HOSPITAL | Age: 60
End: 2020-02-21

## 2020-02-21 NOTE — OUTREACH NOTE
Total Joint Month 1 Survey      Responses   Facility patient discharged from?  [facility]   Does the patient have one of the following disease processes/diagnoses(primary or secondary)?  Total Joint Replacement   Joint surgery performed?  Hip   Month 1 attempt successful?  Yes   Call start time  0959   Call end time  1001   Has the patient been back in either the hospital or Emergency Department since discharge?  No   Discharge diagnosis  : Status post total replacement of hip    What is the patient's perception of their functional status since discharge?  Improving   Is the patient/caregiver able to teach back the hierarchy of who to call/visit for symptoms/problems? PCP, Specialist, Home health nurse, Urgent Care, ED, 911  Yes   Additional teach back comments  Pt says she is doing really well, no questions or concerns at this time.   Month 1 call completed?  Yes   Revoked  No further contact(revokes)-requires comment   Graduated/Revoked comments  pt says that no further calls will be needed, goals are met.          Yee Hassan RN

## 2020-03-20 ENCOUNTER — TELEPHONE (OUTPATIENT)
Dept: FAMILY MEDICINE CLINIC | Facility: CLINIC | Age: 60
End: 2020-03-20

## 2020-04-02 ENCOUNTER — TELEPHONE (OUTPATIENT)
Dept: FAMILY MEDICINE CLINIC | Facility: CLINIC | Age: 60
End: 2020-04-02

## 2020-04-02 ENCOUNTER — TELEMEDICINE (OUTPATIENT)
Dept: FAMILY MEDICINE CLINIC | Facility: CLINIC | Age: 60
End: 2020-04-02

## 2020-04-02 DIAGNOSIS — J01.10 ACUTE NON-RECURRENT FRONTAL SINUSITIS: Primary | ICD-10-CM

## 2020-04-02 PROCEDURE — 99213 OFFICE O/P EST LOW 20 MIN: CPT | Performed by: NURSE PRACTITIONER

## 2020-04-02 RX ORDER — AMOXICILLIN 875 MG/1
875 TABLET, COATED ORAL 2 TIMES DAILY
Qty: 20 TABLET | Refills: 0 | Status: SHIPPED | OUTPATIENT
Start: 2020-04-02 | End: 2020-04-12

## 2020-04-02 RX ORDER — FLUCONAZOLE 150 MG/1
150 TABLET ORAL ONCE
Qty: 1 TABLET | Refills: 1 | Status: SHIPPED | OUTPATIENT
Start: 2020-04-02 | End: 2020-04-02

## 2020-04-02 NOTE — PATIENT INSTRUCTIONS
Sinusitis, Adult  Sinusitis is soreness and swelling (inflammation) of your sinuses. Sinuses are hollow spaces in the bones around your face. They are located:  · Around your eyes.  · In the middle of your forehead.  · Behind your nose.  · In your cheekbones.  Your sinuses and nasal passages are lined with a fluid called mucus. Mucus drains out of your sinuses. Swelling can trap mucus in your sinuses. This lets germs (bacteria, virus, or fungus) grow, which leads to infection. Most of the time, this condition is caused by a virus.  What are the causes?  This condition is caused by:  · Allergies.  · Asthma.  · Germs.  · Things that block your nose or sinuses.  · Growths in the nose (nasal polyps).  · Chemicals or irritants in the air.  · Fungus (rare).  What increases the risk?  You are more likely to develop this condition if:  · You have a weak body defense system (immune system).  · You do a lot of swimming or diving.  · You use nasal sprays too much.  · You smoke.  What are the signs or symptoms?  The main symptoms of this condition are pain and a feeling of pressure around the sinuses. Other symptoms include:  · Stuffy nose (congestion).  · Runny nose (drainage).  · Swelling and warmth in the sinuses.  · Headache.  · Toothache.  · A cough that may get worse at night.  · Mucus that collects in the throat or the back of the nose (postnasal drip).  · Being unable to smell and taste.  · Being very tired (fatigue).  · A fever.  · Sore throat.  · Bad breath.  How is this diagnosed?  This condition is diagnosed based on:  · Your symptoms.  · Your medical history.  · A physical exam.  · Tests to find out if your condition is short-term (acute) or long-term (chronic). Your doctor may:  ? Check your nose for growths (polyps).  ? Check your sinuses using a tool that has a light (endoscope).  ? Check for allergies or germs.  ? Do imaging tests, such as an MRI or CT scan.  How is this treated?  Treatment for this condition  depends on the cause and whether it is short-term or long-term.  · If caused by a virus, your symptoms should go away on their own within 10 days. You may be given medicines to relieve symptoms. They include:  ? Medicines that shrink swollen tissue in the nose.  ? Medicines that treat allergies (antihistamines).  ? A spray that treats swelling of the nostrils.   ? Rinses that help get rid of thick mucus in your nose (nasal saline washes).  · If caused by bacteria, your doctor may wait to see if you will get better without treatment. You may be given antibiotic medicine if you have:  ? A very bad infection.  ? A weak body defense system.  · If caused by growths in the nose, you may need to have surgery.  Follow these instructions at home:  Medicines  · Take, use, or apply over-the-counter and prescription medicines only as told by your doctor. These may include nasal sprays.  · If you were prescribed an antibiotic medicine, take it as told by your doctor. Do not stop taking the antibiotic even if you start to feel better.  Hydrate and humidify    · Drink enough water to keep your pee (urine) pale yellow.  · Use a cool mist humidifier to keep the humidity level in your home above 50%.  · Breathe in steam for 10-15 minutes, 3-4 times a day, or as told by your doctor. You can do this in the bathroom while a hot shower is running.  · Try not to spend time in cool or dry air.  Rest  · Rest as much as you can.  · Sleep with your head raised (elevated).  · Make sure you get enough sleep each night.  General instructions    · Put a warm, moist washcloth on your face 3-4 times a day, or as often as told by your doctor. This will help with discomfort.  · Wash your hands often with soap and water. If there is no soap and water, use hand .  · Do not smoke. Avoid being around people who are smoking (secondhand smoke).  · Keep all follow-up visits as told by your doctor. This is important.  Contact a doctor if:  · You  have a fever.  · Your symptoms get worse.  · Your symptoms do not get better within 10 days.  Get help right away if:  · You have a very bad headache.  · You cannot stop throwing up (vomiting).  · You have very bad pain or swelling around your face or eyes.  · You have trouble seeing.  · You feel confused.  · Your neck is stiff.  · You have trouble breathing.  Summary  · Sinusitis is swelling of your sinuses. Sinuses are hollow spaces in the bones around your face.  · This condition is caused by tissues in your nose that become inflamed or swollen. This traps germs. These can lead to infection.  · If you were prescribed an antibiotic medicine, take it as told by your doctor. Do not stop taking it even if you start to feel better.  · Keep all follow-up visits as told by your doctor. This is important.  This information is not intended to replace advice given to you by your health care provider. Make sure you discuss any questions you have with your health care provider.  Document Released: 06/05/2009 Document Revised: 05/20/2019 Document Reviewed: 05/20/2019  [employer] Patient Education © 2020 [employer]

## 2020-04-02 NOTE — PROGRESS NOTES
Bonnie Syed    1960  [id number]        [facility] pt is new to me.  She has requested video visit for sinus symptoms. She was not able to connect to video and so we conducted visit on telephone.     She has had ongoing sinus pain mostly since February and though it would improve with OTC meds.  She has a dull daily HA across her forehead which has progressively worsened.     She also has had some intermittent b/l ear pain and this has worsened over the last week or so.  She had gone to [facility] appr 1 month ago and was told her ears have fluid, but were not infected and not given abx.  Since then they have gradually worsened. The pain is more noticeable at night when lying down.      Is using singulair, nasacort and allegra daily.  Getting weekly immunotherapy. Went off allergy shots x 1 month since surgery, but has now restarted.    She has had sinus infections in past and has taken abx and they have worked well for her.  Does not take often-usually from [facility].      She is on remicade for RA and last infusion was appr 1 month ago.      She is intolerant to decongestants-they raise her pressures up significantly.     Review of Systems - General ROS: negative for - chills, fever or malaise  ENT ROS: positive for - headaches, nasal congestion and sinus pain  negative for - sore throat, vertigo or vocal changes  Respiratory ROS: no cough, shortness of breath, or wheezing  Gastrointestinal ROS: negative for - abdominal pain, diarrhea or nausea/vomiting    Exam:  Limited by phone encounter.  She seemed to be alert and oriented, pleasant.  She was not congested sounding and no cough, dyspnea appreciated.  She was able to complete sentences easily.  Her mood and affect seemed appropriate. Good historian.     Plan:  She is followed by allergist and is using all controlling meds as directed.  Sinusitis has been ongoing almost 2 months with some worsening.  She is intolerant to decongestants.  She is somewhat  immunosuppressed with Remicade.  Will give amox and she will wait and see if she is able to decongest herself with recommendation from allergist and if her symptoms continue to worsen she will start abx.  I have given diflucan for vaginal yeast if she needs it post abx.  We discussed s/s requiring follow up or emergent tx.         Bonnie was seen today for sinusitis and allergic rhinitis.    Diagnoses and all orders for this visit:    Acute non-recurrent frontal sinusitis  -     amoxicillin (AMOXIL) 875 MG tablet; Take 1 tablet by mouth 2 (Two) Times a Day for 10 days.  -     fluconazole (DIFLUCAN) 150 MG tablet; Take 1 tablet by mouth 1 (One) Time for 1 dose.        Any medications prescribed have been sent electronically to   [facility] [address] [place] [address] - [address] [place] [address] AT [address] [facility] - [phone]  - [phone] FX  [address] [facility] [address]  Phone: [phone] Fax: [phone]        Nena Gilman, ADEN  04/02/20  1:10 PM

## 2020-04-02 NOTE — TELEPHONE ENCOUNTER
PATIENT STATES THAT SHE HAS A LOT OF PRESSURE IN HER HEAD, HEADACHE , EARS ARE STUFFED UP AND ACHE. PATIENT REPORTS THAT  THE OVER COUNTER MEDS ARE NOT HELPING. PATIENT IS INQUIRING IF THE DOCTOR COULD PRESCRIBE HER SOMETHING TO MAKE HER FEEL BETTER.      PLEASE ADVISE

## 2020-04-10 ENCOUNTER — TELEPHONE (OUTPATIENT)
Dept: FAMILY MEDICINE CLINIC | Facility: CLINIC | Age: 60
End: 2020-04-10

## 2020-04-10 NOTE — TELEPHONE ENCOUNTER
PATIENT CALLED AND STATES SHE MAY NEED TO CHANGE   atenolol (TENORMIN) 25 MG tablet  SHE FEELS IT IS NOT WORKING. HER BLOOD PRESSURE WAS HIGH AT HER INFUSION YESTERDAY. SHE MAY ALSO EXPERIENCING SOME ANXIETY.     PLEASE CALL AND ADVISE [phone]    [facility] [address] AT [address] [facility] - [phone]  - [phone]   [phone]

## 2020-04-14 ENCOUNTER — TELEMEDICINE (OUTPATIENT)
Dept: FAMILY MEDICINE CLINIC | Facility: CLINIC | Age: 60
End: 2020-04-14

## 2020-04-14 DIAGNOSIS — I10 ESSENTIAL HYPERTENSION: Primary | ICD-10-CM

## 2020-04-14 PROBLEM — L90.0 LICHEN SCLEROSUS: Status: ACTIVE | Noted: 2019-12-12

## 2020-04-14 PROCEDURE — 99213 OFFICE O/P EST LOW 20 MIN: CPT | Performed by: FAMILY MEDICINE

## 2020-04-14 NOTE — PATIENT INSTRUCTIONS
Pleasure interacting with you today during our telehealth visit.  As discussed, buy a home blood pressure monitor and check blood pressure once or twice a day.  You may also check it at work if you wish.  Just remember to sit for a few minutes first.  Send me readings in about 3 to 4 weeks.  We will then decide further treatment options.

## 2020-04-14 NOTE — PROGRESS NOTES
Subjective   Bonnie Syed is a 59 y.o. female.     Chief Complaint   Patient presents with   • Hypertension        History of Present Illness    COVID-19 pandemic telehealth visit.  Audio and video, good connection.  Good lighting.    You have chosen to receive care through a telehealth visit.  Do you consent to use a video/audio connection for your medical care today? Yes    Patient is concerned about elevated blood pressure.  She has history of rheumatoid arthritis and hypertension.  On low-dose atenolol 25 mg for about 20 years.  At her infusions for her rheumatoid arthritis recently she has had some elevated blood pressures.  Although at this time it was normal.  She is not checking her blood pressure at home.  Most recent readings are in the 120s over 80 range.  She has no cardiovascular or neurovascular symptoms.  She had some anxiety.  She had a hip replacement done a few months ago.  She is back to work just last week.        The following portions of the patient's history were reviewed and updated as appropriate: allergies, current medications, past family history, past medical history, past social history, past surgical history and problem list.          Review of Systems   Constitutional: Negative.    Respiratory: Negative.    Neurological: Negative.    Psychiatric/Behavioral: The patient is nervous/anxious.        Objective   There were no vitals taken for this visit.  Physical Exam   Constitutional: She is oriented to person, place, and time.   Good video connection.  No acute distress.  Appears comfortable.   HENT:   Head: Atraumatic.   Neck: Normal range of motion.   Pulmonary/Chest: Effort normal. No respiratory distress.   Neurological: She is alert and oriented to person, place, and time. Coordination normal.   Skin: No pallor.   Psychiatric: She has a normal mood and affect.       Assessment/Plan   Bonnie was seen today for hypertension.    Diagnoses and all orders for this visit:    Essential  hypertension    Essential hypertension.  Uncertain control.  At this time I am recommending further evaluation with home blood pressure monitoring.  Recommend checking blood pressure twice a day after sitting for 5 minutes.  She is going to order a specific brand off of the Internet, we discussed this in detail.  With monitoring is in about 3 or 4 weeks.  If blood pressure remains elevated, I will I would recommend adding low-dose lisinopril.  Follow-up visit pending blood pressure results.    Total duration of telehealth visit approximately 15 minutes.

## 2020-04-21 ENCOUNTER — TELEMEDICINE (OUTPATIENT)
Dept: FAMILY MEDICINE CLINIC | Facility: CLINIC | Age: 60
End: 2020-04-21

## 2020-04-21 DIAGNOSIS — F41.9 ANXIETY: Primary | ICD-10-CM

## 2020-04-21 DIAGNOSIS — I10 ESSENTIAL HYPERTENSION: ICD-10-CM

## 2020-04-21 PROCEDURE — 99214 OFFICE O/P EST MOD 30 MIN: CPT | Performed by: FAMILY MEDICINE

## 2020-04-21 RX ORDER — ALPRAZOLAM 0.25 MG/1
0.25 TABLET ORAL 2 TIMES DAILY PRN
Qty: 15 TABLET | Refills: 0 | Status: SHIPPED | OUTPATIENT
Start: 2020-04-21 | End: 2020-04-22

## 2020-04-21 RX ORDER — ATENOLOL 50 MG/1
50 TABLET ORAL DAILY
Qty: 90 TABLET | Refills: 1 | Status: SHIPPED | OUTPATIENT
Start: 2020-04-21 | End: 2020-04-22 | Stop reason: SDUPTHER

## 2020-04-21 RX ORDER — ESCITALOPRAM OXALATE 10 MG/1
10 TABLET ORAL DAILY
Qty: 90 TABLET | Refills: 1 | Status: SHIPPED | OUTPATIENT
Start: 2020-04-21 | End: 2020-04-22 | Stop reason: SDUPTHER

## 2020-04-21 NOTE — PATIENT INSTRUCTIONS
Per our conversation today, starting on generic Lexapro 10mg daily.  1/2 tablet a day for the first few days to prevent side effects.  I am also prescribing Xanax as needed for more severe anxiety symptoms.  ...15 tablets with no refills.  This is a controlled substance and may be habit-forming.  Take it sparingly.  Otherwise lets discuss again at our telehealth visit in 3 weeks.  Also double up on your atenolol from 25 mg up to 50 mg day.  All medications have been sent to your pharmacy.  Please give us a message if you have any concerns.

## 2020-04-21 NOTE — PROGRESS NOTES
Torres Syed is a 59 y.o. female.     No chief complaint on file.     Chief complaint anxiety and high blood pressure    History of Present Illness     Coronavirus pandemic telehealth visit.  Excellent video and audio quality.    You have chosen to receive care through a telehealth visit.  Do you consent to use a video/audio connection for your medical care today? Yes    Follow-up elevated blood pressure readings.  They are running about 160 systolic at the rheumatoid arthritis infusion treatments.  She bought a home blood pressure monitor but she is been very afraid to check at home.  She has not.  She is afraid the numbers will be high.  She is having no chest pain or chest pressure but headaches.  However anxiety continues to be an issue with some mild depression symptoms of mostly anxiety and situational stressors.  Has been going on for a few weeks now and getting worse.  She had a panic attack-like symptoms now and then.  No alcohol use.  She states she has no suicidal ideation.  She is cut back on caffeine.  She is interested in taking some medication that can help her for the time being.    The following portions of the patient's history were reviewed and updated as appropriate: allergies, current medications, past family history, past medical history, past social history, past surgical history and problem list.          Review of Systems   Constitutional: Negative.    Respiratory: Negative.    Cardiovascular: Negative.    Neurological: Negative.    Psychiatric/Behavioral: Positive for dysphoric mood. Negative for suicidal ideas. The patient is nervous/anxious.        Objective   There were no vitals taken for this visit.  Physical Exam   Constitutional: She is oriented to person, place, and time.   Appears in no severe distress although slightly anxious.   HENT:   Head: Atraumatic.   Neck: Normal range of motion.   Pulmonary/Chest: Effort normal. No respiratory distress.   Neurological: She is  alert and oriented to person, place, and time. Coordination normal.   Skin: No pallor.   Psychiatric:   Mood anxious       Assessment/Plan   Diagnoses and all orders for this visit:    Anxiety    Essential hypertension    Other orders  -     atenolol (TENORMIN) 50 MG tablet; Take 1 tablet by mouth Daily.  -     ALPRAZolam (XANAX) 0.25 MG tablet; Take 1 tablet by mouth 2 (Two) Times a Day As Needed (severe anxiety).  -     escitalopram (Lexapro) 10 MG tablet; Take 1 tablet by mouth Daily.      Anxiety.  Moderate to severe at times.  Probable generalized anxiety disorder.  With without panic attacks.  Recommending Lexapro 10 mg a day, 1/2 tablet a day for the first couple of days.  Side effects including GI upset, mild tremor, and activation symptoms discussed in some detail.  I will see her back in 3 weeks for a telehealth visit to porfirio and see how she is doing.  Also prescribing a limited dose of alprazolam 0.25 mg twice a day as needed for severe anxiety.  Number 15 pills with no refills.  Patient is aware this is a controlled substance and may be habit-forming.  May cause sedation.  To take for severe anxiety.  As needed.    Hypertension.  She is fearful of checking her blood pressure at home.  At this time I am going to increase her atenolol from 25 mg up to 50 mg a day.  She is having no cardiovascular symptoms.  The atenolol may help some of her anxiety adrenergic symptoms.  She will call us with questions.    Total duration of telehealth visit approximately 25 minutes.

## 2020-04-22 RX ORDER — ATENOLOL 50 MG/1
50 TABLET ORAL DAILY
Qty: 90 TABLET | Refills: 1 | Status: SHIPPED | OUTPATIENT
Start: 2020-04-22 | End: 2020-06-01

## 2020-04-22 RX ORDER — ESCITALOPRAM OXALATE 10 MG/1
10 TABLET ORAL DAILY
Qty: 90 TABLET | Refills: 1 | Status: SHIPPED | OUTPATIENT
Start: 2020-04-22 | End: 2020-05-12

## 2020-04-22 RX ORDER — BUSPIRONE HYDROCHLORIDE 5 MG/1
5 TABLET ORAL 2 TIMES DAILY
Qty: 60 TABLET | Refills: 2 | Status: SHIPPED | OUTPATIENT
Start: 2020-04-22 | End: 2020-05-12 | Stop reason: SDUPTHER

## 2020-05-12 ENCOUNTER — TELEMEDICINE (OUTPATIENT)
Dept: FAMILY MEDICINE CLINIC | Facility: CLINIC | Age: 60
End: 2020-05-12

## 2020-05-12 DIAGNOSIS — F41.9 ANXIETY: ICD-10-CM

## 2020-05-12 DIAGNOSIS — I10 ESSENTIAL HYPERTENSION: Primary | ICD-10-CM

## 2020-05-12 PROCEDURE — 99213 OFFICE O/P EST LOW 20 MIN: CPT | Performed by: FAMILY MEDICINE

## 2020-05-12 RX ORDER — BUSPIRONE HYDROCHLORIDE 10 MG/1
10 TABLET ORAL 2 TIMES DAILY
Qty: 60 TABLET | Refills: 2 | Status: SHIPPED | OUTPATIENT
Start: 2020-05-12 | End: 2020-05-26

## 2020-05-12 NOTE — PROGRESS NOTES
Subjective   Bonnie Syed is a 59 y.o. female.     Chief Complaint   Patient presents with   • Anxiety        History of Present Illness    Coronavirus pandemic telehealth visit.  Good audio and video connection.  Patient gave informed consent through the [employer] check in process.    Hypertension.  Patient is afraid to check her blood pressure at home.  Makes her anxious.  Been elevated when she gets her Remicade infusions for rheumatoid arthritis.  She is taking the atenolol at a higher dose now.  Now 50 mg a day.  Previously 25 mg a day.    Anxiety.  Generalized.  I start BuSpar 5 mg twice a day last visit.  She did not want to take the Lexapro because of the side effect concerns she has.  Her son takes Lexapro and is currently coming off of it and the patient is unsure if she wants to come off of it.    The following portions of the patient's history were reviewed and updated as appropriate: allergies, current medications, past family history, past medical history, past social history, past surgical history and problem list.          Review of Systems   Constitutional: Negative.    Musculoskeletal: Positive for arthralgias.   Psychiatric/Behavioral: The patient is nervous/anxious.        Objective   There were no vitals taken for this visit.  Physical Exam   Constitutional: She is oriented to person, place, and time.   HENT:   Head: Atraumatic.   Neck: Normal range of motion.   Pulmonary/Chest: Effort normal. No respiratory distress.   Neurological: She is alert and oriented to person, place, and time. Coordination normal.   Skin: No pallor.   Psychiatric: She has a normal mood and affect. Her behavior is normal.       Assessment/Plan   Bonnie was seen today for anxiety.    Diagnoses and all orders for this visit:    Essential hypertension    Anxiety    Other orders  -     busPIRone (BUSPAR) 10 MG tablet; Take 1 tablet by mouth 2 (Two) Times a Day. Do not take with alprazolam      Hypertension.  Likely needs better  control.  But we need more numbers.  I want her to start checking her blood pressure at home on a regular basis and sending us readings in 2 or 3 weeks.  She understands the importance of this.    Anxiety.  Continue to be an issue.  I am increasing her BuSpar to 10 mg twice a day.  Side effects discussed.  I am also recommend she get to a psychiatrist.  She states she can see her son psychiatrist.  I do recommend that.  She needs a referral she will let me know.  She states she can call directly.    Length of visit 15 minutes.    I want see her back within 6 to 12 weeks.

## 2020-05-26 ENCOUNTER — OFFICE VISIT (OUTPATIENT)
Dept: FAMILY MEDICINE CLINIC | Facility: CLINIC | Age: 60
End: 2020-05-26

## 2020-05-26 ENCOUNTER — APPOINTMENT (OUTPATIENT)
Dept: LAB | Facility: HOSPITAL | Age: 60
End: 2020-05-26

## 2020-05-26 DIAGNOSIS — M05.742 RHEUMATOID ARTHRITIS INVOLVING BOTH HANDS WITH POSITIVE RHEUMATOID FACTOR (HCC): ICD-10-CM

## 2020-05-26 DIAGNOSIS — D64.9 ANEMIA, UNSPECIFIED TYPE: ICD-10-CM

## 2020-05-26 DIAGNOSIS — I10 ESSENTIAL HYPERTENSION: ICD-10-CM

## 2020-05-26 DIAGNOSIS — E87.6 HYPOKALEMIA: Primary | ICD-10-CM

## 2020-05-26 DIAGNOSIS — M05.741 RHEUMATOID ARTHRITIS INVOLVING BOTH HANDS WITH POSITIVE RHEUMATOID FACTOR (HCC): ICD-10-CM

## 2020-05-26 LAB
ANION GAP SERPL CALCULATED.3IONS-SCNC: 11.4 MMOL/L (ref 5–15)
BUN BLD-MCNC: 13 MG/DL (ref 6–20)
BUN/CREAT SERPL: 17.8 (ref 7–25)
CALCIUM SPEC-SCNC: 10.3 MG/DL (ref 8.6–10.5)
CHLORIDE SERPL-SCNC: 100 MMOL/L (ref 98–107)
CO2 SERPL-SCNC: 29.6 MMOL/L (ref 22–29)
CREAT BLD-MCNC: 0.73 MG/DL (ref 0.57–1)
GFR SERPL CREATININE-BSD FRML MDRD: 82 ML/MIN/1.73
GLUCOSE BLD-MCNC: 119 MG/DL (ref 65–99)
POTASSIUM BLD-SCNC: 2.8 MMOL/L (ref 3.5–5.2)
SODIUM BLD-SCNC: 141 MMOL/L (ref 136–145)

## 2020-05-26 PROCEDURE — 80048 BASIC METABOLIC PNL TOTAL CA: CPT | Performed by: FAMILY MEDICINE

## 2020-05-26 PROCEDURE — 36415 COLL VENOUS BLD VENIPUNCTURE: CPT | Performed by: FAMILY MEDICINE

## 2020-05-26 PROCEDURE — 99443 PR PHYS/QHP TELEPHONE EVALUATION 21-30 MIN: CPT | Performed by: FAMILY MEDICINE

## 2020-05-26 RX ORDER — POTASSIUM CHLORIDE 750 MG/1
10 TABLET, FILM COATED, EXTENDED RELEASE ORAL 2 TIMES DAILY
Qty: 60 TABLET | Refills: 1 | Status: SHIPPED | OUTPATIENT
Start: 2020-05-26 | End: 2020-07-20

## 2020-05-26 NOTE — PROGRESS NOTES
Subjective   Bonnie Syed is a 59 y.o. female.     Chief Complaint   Patient presents with   • Diarrhea        History of Present Illness    Coronavirus pandemic telephone visit.    You have chosen to receive care through a telephone visit. Do you consent to use a telephone visit for your medical care today? Yes    59-year-old female history of hypertension, with phobia of checking blood pressure readings, was told by her rheumatologist that she has a potassium level today of 2.6.  Last check was normal about 4 months ago.  She describes diarrhea.  Daily.  But just once a day.  Loose stool.  Has had a lot of stressors lately.  She thinks that caused the diarrhea to get worse.  Not taking laxatives.  She is not taking diuretics.  Previously placed on BuSpar for anxiety but she stopped it did not like the way it made her feel.  He has an appointment coming up with a psychiatrist in June.  She does not have muscle weakness.  Otherwise feels fine.  Other than the anxiety no mental status changes.    I repeated the potassium level today.  It is now 2.8.  The bicarb is moderately elevated.  Remainder of BMP is normal including sodium glucose and creatinine.  The glucose is minimally elevated nonfasting but not in high enough range to cause hypokalemia.    She is not having vomiting.  She is not on insulin.  She does not have diabetes.  She has had the diarrhea as above.    She has had elevated blood pressure readings.  She has been afraid to take any blood pressure medication with the exception of the atenolol.  Which are recently doubled.  She is very afraid to have a blood pressure check.  She will start to check and then should be very anxious and stop it.  She does have a home blood pressure monitor.    She had a check at the infusion center a week or 2 ago and was elevated approximately 170/110.    Patient had back surgery a number of months ago.  She was anemic and a hemoglobin of 9.  I will have her repeat  yet.        The following portions of the patient's history were reviewed and updated as appropriate: allergies, current medications, past family history, past medical history, past social history, past surgical history and problem list.          Review of Systems   Constitutional: Negative.  Negative for fatigue and fever.   Respiratory: Negative.    Cardiovascular: Negative.    Gastrointestinal: Positive for diarrhea.   Genitourinary: Negative.    Musculoskeletal: Negative.    Neurological: Negative.  Negative for weakness.   Psychiatric/Behavioral: The patient is nervous/anxious.        Objective   There were no vitals taken for this visit.  Physical Exam   Constitutional: She is oriented to person, place, and time.   She sounds anxious but in no acute distress.   Pulmonary/Chest: No respiratory distress.   Neurological: She is alert and oriented to person, place, and time.   Psychiatric:   Anxious.       Assessment/Plan   Bonnie was seen today for diarrhea.    Diagnoses and all orders for this visit:    Hypokalemia  -     Magnesium    Essential hypertension    Rheumatoid arthritis involving both hands with positive rheumatoid factor (CMS/[facility])      Significant hypokalemia, likely asymptomatic.  Likely a prolonged loss.  No diuretics.  She is on Remicade infusions and Arava.  The Arava can cause diarrhea which could lead to potassium loss.  She is describing a significant diarrhea stool once a day.  For some time now.  I am starting her on potassium supplementation which is already been sent to the pharmacy.    Hypertension.  Not controlled.  Complicated by hypertension phobia.  She does have an appointment with a psychiatrist coming up.  The low potassium and high blood pressure readings are suggestive of possible hyperaldosteronism.  I am checking a serum aldosterone renin ratio along with a potassium creatinine ratio of the urine.  To consider spironolactone use if the potassium not improving and the aldosterone  level indicates.  I am also going to place a referral for a nephrologist if things are not improving soon.  She should get to emergency room if any severe symptoms such as severe weakness palpitations or other severe concerns.    Rheumatoid arthritis.  Followed by rheumatology.  Symptoms have been stable.    Total duration of service 25 minutes.

## 2020-05-27 ENCOUNTER — LAB (OUTPATIENT)
Dept: LAB | Facility: HOSPITAL | Age: 60
End: 2020-05-27

## 2020-05-27 DIAGNOSIS — E87.6 HYPOKALEMIA: ICD-10-CM

## 2020-05-27 DIAGNOSIS — I10 ESSENTIAL HYPERTENSION: Primary | ICD-10-CM

## 2020-05-27 LAB
ALBUMIN SERPL-MCNC: 5 G/DL (ref 3.5–5.2)
ALBUMIN/GLOB SERPL: 1.7 G/DL
ALP SERPL-CCNC: 103 U/L (ref 39–117)
ALT SERPL W P-5'-P-CCNC: 23 U/L (ref 1–33)
ANION GAP SERPL CALCULATED.3IONS-SCNC: 15.1 MMOL/L (ref 5–15)
AST SERPL-CCNC: 33 U/L (ref 1–32)
BASOPHILS # BLD AUTO: 0.06 10*3/MM3 (ref 0–0.2)
BASOPHILS NFR BLD AUTO: 0.7 % (ref 0–1.5)
BILIRUB SERPL-MCNC: 0.4 MG/DL (ref 0.1–1.2)
BUN BLD-MCNC: 12 MG/DL (ref 6–20)
BUN/CREAT SERPL: 16.9 (ref 7–25)
CALCIUM SPEC-SCNC: 10.1 MG/DL (ref 8.6–10.5)
CHLORIDE SERPL-SCNC: 99 MMOL/L (ref 98–107)
CO2 SERPL-SCNC: 25.9 MMOL/L (ref 22–29)
CREAT BLD-MCNC: 0.71 MG/DL (ref 0.57–1)
CREAT UR-MCNC: 34 MG/DL
DEPRECATED RDW RBC AUTO: 44.8 FL (ref 37–54)
EOSINOPHIL # BLD AUTO: 0.24 10*3/MM3 (ref 0–0.4)
EOSINOPHIL NFR BLD AUTO: 2.7 % (ref 0.3–6.2)
ERYTHROCYTE [DISTWIDTH] IN BLOOD BY AUTOMATED COUNT: 15.9 % (ref 12.3–15.4)
GFR SERPL CREATININE-BSD FRML MDRD: 84 ML/MIN/1.73
GLOBULIN UR ELPH-MCNC: 2.9 GM/DL
GLUCOSE BLD-MCNC: 99 MG/DL (ref 65–99)
HCT VFR BLD AUTO: 42.8 % (ref 34–46.6)
HGB BLD-MCNC: 13.9 G/DL (ref 12–15.9)
IMM GRANULOCYTES # BLD AUTO: 0.01 10*3/MM3 (ref 0–0.05)
IMM GRANULOCYTES NFR BLD AUTO: 0.1 % (ref 0–0.5)
LYMPHOCYTES # BLD AUTO: 3.28 10*3/MM3 (ref 0.7–3.1)
LYMPHOCYTES NFR BLD AUTO: 37.3 % (ref 19.6–45.3)
MAGNESIUM SERPL-MCNC: 1.9 MG/DL (ref 1.6–2.6)
MCH RBC QN AUTO: 25.7 PG (ref 26.6–33)
MCHC RBC AUTO-ENTMCNC: 32.5 G/DL (ref 31.5–35.7)
MCV RBC AUTO: 79.3 FL (ref 79–97)
MONOCYTES # BLD AUTO: 0.9 10*3/MM3 (ref 0.1–0.9)
MONOCYTES NFR BLD AUTO: 10.2 % (ref 5–12)
NEUTROPHILS # BLD AUTO: 4.31 10*3/MM3 (ref 1.7–7)
NEUTROPHILS NFR BLD AUTO: 49 % (ref 42.7–76)
NRBC BLD AUTO-RTO: 0 /100 WBC (ref 0–0.2)
PLATELET # BLD AUTO: 251 10*3/MM3 (ref 140–450)
PMV BLD AUTO: 10.6 FL (ref 6–12)
POTASSIUM BLD-SCNC: 2.9 MMOL/L (ref 3.5–5.2)
POTASSIUM UR-SCNC: 11.7 MMOL/L
PROT SERPL-MCNC: 7.9 G/DL (ref 6–8.5)
RBC # BLD AUTO: 5.4 10*6/MM3 (ref 3.77–5.28)
SODIUM BLD-SCNC: 140 MMOL/L (ref 136–145)
WBC NRBC COR # BLD: 8.8 10*3/MM3 (ref 3.4–10.8)

## 2020-05-27 PROCEDURE — 82088 ASSAY OF ALDOSTERONE: CPT | Performed by: FAMILY MEDICINE

## 2020-05-27 PROCEDURE — 84244 ASSAY OF RENIN: CPT | Performed by: FAMILY MEDICINE

## 2020-05-27 PROCEDURE — 83735 ASSAY OF MAGNESIUM: CPT | Performed by: FAMILY MEDICINE

## 2020-05-27 PROCEDURE — 84133 ASSAY OF URINE POTASSIUM: CPT | Performed by: FAMILY MEDICINE

## 2020-05-27 PROCEDURE — 82570 ASSAY OF URINE CREATININE: CPT | Performed by: FAMILY MEDICINE

## 2020-05-27 PROCEDURE — 36415 COLL VENOUS BLD VENIPUNCTURE: CPT | Performed by: FAMILY MEDICINE

## 2020-05-27 PROCEDURE — 85025 COMPLETE CBC W/AUTO DIFF WBC: CPT | Performed by: FAMILY MEDICINE

## 2020-05-27 PROCEDURE — 80053 COMPREHEN METABOLIC PANEL: CPT | Performed by: FAMILY MEDICINE

## 2020-05-27 RX ORDER — SPIRONOLACTONE 50 MG/1
50 TABLET, FILM COATED ORAL DAILY
Qty: 30 TABLET | Refills: 2 | Status: SHIPPED | OUTPATIENT
Start: 2020-05-27 | End: 2020-06-22 | Stop reason: SDUPTHER

## 2020-05-27 RX ORDER — SPIRONOLACTONE 50 MG/1
50 TABLET, FILM COATED ORAL DAILY
Qty: 90 TABLET | Refills: 1 | Status: CANCELLED | OUTPATIENT
Start: 2020-05-27

## 2020-05-27 NOTE — TELEPHONE ENCOUNTER
----- Message from Bonnie Syed sent at 5/27/2020  2:11 PM EDT -----  Regarding: Prescription Question  Contact: [phone]  [facility] had a rejection notice when they tried to process Spironolactone. Per my insurance, they are allowed 2 refills only, then the remaining are to be sent into my mail order, [facility].  I called [facility] back, but it was again rejected.  Not sure if you want to try another medicine or call [facility] [phone] to get it mailed to me.  Thanks! Bonnie

## 2020-05-29 ENCOUNTER — OFFICE VISIT (OUTPATIENT)
Dept: FAMILY MEDICINE CLINIC | Facility: CLINIC | Age: 60
End: 2020-05-29

## 2020-05-29 DIAGNOSIS — I10 ESSENTIAL HYPERTENSION: Primary | ICD-10-CM

## 2020-05-29 DIAGNOSIS — F41.9 ANXIETY: ICD-10-CM

## 2020-05-29 DIAGNOSIS — E87.6 HYPOKALEMIA: ICD-10-CM

## 2020-05-29 PROCEDURE — 99213 OFFICE O/P EST LOW 20 MIN: CPT | Performed by: FAMILY MEDICINE

## 2020-05-29 RX ORDER — ALPRAZOLAM 0.25 MG/1
0.25 TABLET ORAL 2 TIMES DAILY PRN
Qty: 6 TABLET | Refills: 0 | Status: SHIPPED | OUTPATIENT
Start: 2020-05-29 | End: 2020-11-02

## 2020-05-29 NOTE — PROGRESS NOTES
"Torres Syed is a 59 y.o. female.     Chief Complaint   Patient presents with   • Hypertension        History of Present Illness    Coronavirus pandemic telehealth visit.  Face time.  Excellent A/V connection.    You have chosen to receive care through a telehealth visit.  Do you consent to use a video/audio connection for your medical care today? Yes    Follow-up hypertension and anxiety.  She has an appointment coming up with a psychiatrist.  Her blood pressure was running high over the last few days, she sent me a [employer] message.  She has had hypokalemia first noted by her rheumatologist.  Her random urine potassium creatinine ratio was 34.  This is suggestive of potassium loss through the kidneys.  She has had some evening diarrhea, 1 loose stool a day, which she attributes to \"nerves\".  But no excessive diarrhea.  She is not on any medication that would at first glance cause hyperkalemia.  She has no weakness or numbness.  No other issues other than anxiety.  She states overall she feels well.  Her repeat potassium level this week was 2.9.  Previously 2.6.  Creatinine normal.  Sodium normal.  Magnesium normal.  Aldosterone renin ratio is pending.    I started her on 20 mEq a day of potassium chloride which she started about half a day prior to the lab work being done.  She was started on spironolactone yesterday.  50 mg a day.    She denies any specific symptoms such as headache chest pain palpitations visual changes or other constitutional or concerning symptoms.          The following portions of the patient's history were reviewed and updated as appropriate: allergies, current medications, past family history, past medical history, past social history, past surgical history and problem list.          Review of Systems   Constitutional: Negative.    Respiratory: Negative.    Cardiovascular: Negative.    Genitourinary: Negative.    Neurological: Negative.    Psychiatric/Behavioral: The patient is " nervous/anxious.        Objective   There were no vitals taken for this visit.  Physical Exam    Assessment/Plan   Bonnie was seen today for hypertension.    Diagnoses and all orders for this visit:    Essential hypertension    Hypokalemia    Other orders  -     ALPRAZolam (XANAX) 0.25 MG tablet; Take 1 tablet by mouth 2 (Two) Times a Day As Needed for Anxiety.      Newly diagnosed hypertension with hypokalemia.  Need to rule out hyperaldosteronism.  Aldosterone renin ratio is pending.  Spironolactone has been started 50 mg daily.  She is otherwise asymptomatic.  She will continue to monitor her blood pressure.    Anxiety.  Most challenging, and a moderate hypertension and hypertension monitoring phobia.  She has an appointment coming up with a psychiatrist.  I gave her a short-term prescription for alprazolam 0.25 mg as needed for severe anxiety.  She is aware this is a controlled substance and may be habit-forming.  I will be prescribing 6 tablets.  She understands not to drive with it.  She is to take this if she feels very anxious.    I will see her back in 1 week for recheck.  We will recheck her potassium then.  May need to stop the potassium supplementation.  We should have the aldosterone renin ratio back by then.    Duration of visit 15 minutes

## 2020-05-29 NOTE — PROGRESS NOTES
The low potassium continues to improve.  Now 2.9.  Continue potassium supplementation and new medication spironolactone.  The urine potassium level suggest the diarrhea is not causing the low potassium.  I am waiting the further aldosterone renin hormone levels.

## 2020-06-01 RX ORDER — METOPROLOL SUCCINATE 100 MG/1
100 TABLET, EXTENDED RELEASE ORAL DAILY
Qty: 30 TABLET | Refills: 2 | Status: SHIPPED | OUTPATIENT
Start: 2020-06-01 | End: 2020-06-22 | Stop reason: SDUPTHER

## 2020-06-04 LAB
ALDOST SERPL-MCNC: <1 NG/DL (ref 0–30)
ALDOST/RENIN PLAS-RTO: ABNORMAL {RATIO}
RENIN PLAS-CCNC: <0.167 NG/ML/HR (ref 0.17–5.38)

## 2020-06-05 ENCOUNTER — LAB (OUTPATIENT)
Dept: LAB | Facility: HOSPITAL | Age: 60
End: 2020-06-05

## 2020-06-05 ENCOUNTER — OFFICE VISIT (OUTPATIENT)
Dept: FAMILY MEDICINE CLINIC | Facility: CLINIC | Age: 60
End: 2020-06-05

## 2020-06-05 VITALS
HEIGHT: 64 IN | OXYGEN SATURATION: 97 % | RESPIRATION RATE: 16 BRPM | HEART RATE: 100 BPM | TEMPERATURE: 97.3 F | DIASTOLIC BLOOD PRESSURE: 80 MMHG | SYSTOLIC BLOOD PRESSURE: 160 MMHG | BODY MASS INDEX: 20.86 KG/M2 | WEIGHT: 122.2 LBS

## 2020-06-05 DIAGNOSIS — I10 ESSENTIAL HYPERTENSION: Primary | ICD-10-CM

## 2020-06-05 LAB
ANION GAP SERPL CALCULATED.3IONS-SCNC: 12.6 MMOL/L (ref 5–15)
BUN BLD-MCNC: 19 MG/DL (ref 6–20)
BUN/CREAT SERPL: 21.6 (ref 7–25)
CALCIUM SPEC-SCNC: 11.7 MG/DL (ref 8.6–10.5)
CHLORIDE SERPL-SCNC: 99 MMOL/L (ref 98–107)
CO2 SERPL-SCNC: 24.4 MMOL/L (ref 22–29)
CREAT BLD-MCNC: 0.88 MG/DL (ref 0.57–1)
GFR SERPL CREATININE-BSD FRML MDRD: 66 ML/MIN/1.73
GLUCOSE BLD-MCNC: 121 MG/DL (ref 65–99)
POTASSIUM BLD-SCNC: 4.5 MMOL/L (ref 3.5–5.2)
SODIUM BLD-SCNC: 136 MMOL/L (ref 136–145)

## 2020-06-05 PROCEDURE — 99213 OFFICE O/P EST LOW 20 MIN: CPT | Performed by: FAMILY MEDICINE

## 2020-06-05 PROCEDURE — 80048 BASIC METABOLIC PNL TOTAL CA: CPT | Performed by: FAMILY MEDICINE

## 2020-06-05 PROCEDURE — 36415 COLL VENOUS BLD VENIPUNCTURE: CPT | Performed by: FAMILY MEDICINE

## 2020-06-05 NOTE — PROGRESS NOTES
Good news.  The potassium is now normal.  Stop the potassium pills, but continue the spironolactone.  The calcium level is slightly high.  We will need to repeat this in the future.  I want to repeat a BMP chemistry test in 3 weeks.  Order has been placed.  For the first floor lab.  Keep follow-up appointment as scheduled.

## 2020-06-05 NOTE — PROGRESS NOTES
"Subjective   Bonnie Syed is a 59 y.o. female.     Chief Complaint   Patient presents with   • Hypertension        History of Present Illness    Follow-up hypertension.  Complicated by hypertension phobia.  And generalized anxiety disorder.  She did get to her psychiatrist.  They are keeping her on the alprazolam.  They are considering an SSRI.  She has been checking her blood pressure previously but she stopped again.  She gets anxious checking her blood pressure.  She has no headache.  No chest pain.  No shortness of breath.  No palpitations.  No focal neurological symptoms.  We have her taking potassium supplementation she had hypokalemia.  I was concerned about hyperaldosteronism.  However her aldosterone level was very low.  Currently she is taking metoprolol  mg a day which is a different beta-blocker for her previously on atenolol.  She is at a much higher dose of beta blocker now.  She states her heart rate will get elevated at times when she is anxious.  About 120.  She is also taking spironolactone that I started a few days ago.  She is due for more lab work today.      The following portions of the patient's history were reviewed and updated as appropriate: allergies, current medications, past family history, past medical history, past social history, past surgical history and problem list.          Review of Systems   Constitutional: Negative.    Respiratory: Negative for shortness of breath.    Cardiovascular: Negative for chest pain and palpitations.   Musculoskeletal: Negative for neck pain.   Neurological: Negative for headaches.   Psychiatric/Behavioral: Positive for dysphoric mood. The patient is nervous/anxious.        Objective   Blood pressure 160/80, pulse 100, temperature 97.3 °F (36.3 °C), resp. rate 16, height 162.6 cm (64\"), weight 55.4 kg (122 lb 3.2 oz), SpO2 97 %.  Physical Exam   Constitutional: She appears well-developed and well-nourished. No distress.   Patient is anxious but no " acute distress otherwise.   Neck: No thyromegaly present.   Cardiovascular: Normal rate, regular rhythm, normal heart sounds and intact distal pulses.   Pulmonary/Chest: Effort normal and breath sounds normal.   Musculoskeletal: She exhibits no edema.   Skin: Skin is warm and dry.   Psychiatric: Her behavior is normal. Judgment and thought content normal.   Mood is anxious.   Nursing note and vitals reviewed.      Assessment/Plan   Bonnie was seen today for hypertension.    Diagnoses and all orders for this visit:    Essential hypertension  -     Basic Metabolic Panel      Hypertension.  Not controlled.  Definitely exacerbated by her generalized anxiety disorder.  I recommend she contact her psychiatrist and see if they can start her on an SSRI or something else recommended by him.  I want her to continue the metoprolol XL 10 milligrams a day.  Continue the spironolactone 50 mg a day.  Continue potassium supplementation for now.  The cause of the hypokalemia is unknown.  Hopefully resolving.  If not I want to get her to a nephrologist.  There is no evidence of hyperaldosteronism.    Follow-up in about 3 or 4 weeks for telehealth visit.  She is to be checking her blood pressure daily and sending us readings soon.  She understands importance of this.         Answers for HPI/ROS submitted by the patient on 6/3/2020   Hypertension  What is the primary reason for your visit?: High Blood Pressure

## 2020-06-08 ENCOUNTER — TELEPHONE (OUTPATIENT)
Dept: FAMILY MEDICINE CLINIC | Facility: CLINIC | Age: 60
End: 2020-06-08

## 2020-06-08 NOTE — TELEPHONE ENCOUNTER
HUB please advise:    Called patient to get further information to get her son scheduled as a New patient with Dr. Pena, preferably this week in the late afternoon.

## 2020-06-22 RX ORDER — SPIRONOLACTONE 50 MG/1
50 TABLET, FILM COATED ORAL DAILY
Qty: 90 TABLET | Refills: 1 | Status: SHIPPED | OUTPATIENT
Start: 2020-06-22 | End: 2020-07-20

## 2020-06-22 RX ORDER — METOPROLOL SUCCINATE 100 MG/1
100 TABLET, EXTENDED RELEASE ORAL DAILY
Qty: 90 TABLET | Refills: 1 | Status: SHIPPED | OUTPATIENT
Start: 2020-06-22 | End: 2020-10-20

## 2020-06-26 ENCOUNTER — LAB (OUTPATIENT)
Dept: LAB | Facility: HOSPITAL | Age: 60
End: 2020-06-26

## 2020-06-26 DIAGNOSIS — I10 ESSENTIAL HYPERTENSION: ICD-10-CM

## 2020-06-26 LAB
ANION GAP SERPL CALCULATED.3IONS-SCNC: 9.4 MMOL/L (ref 5–15)
BUN BLD-MCNC: 16 MG/DL (ref 6–20)
BUN/CREAT SERPL: 19.3 (ref 7–25)
CALCIUM SPEC-SCNC: 10 MG/DL (ref 8.6–10.5)
CHLORIDE SERPL-SCNC: 103 MMOL/L (ref 98–107)
CO2 SERPL-SCNC: 27.6 MMOL/L (ref 22–29)
CREAT BLD-MCNC: 0.83 MG/DL (ref 0.57–1)
GFR SERPL CREATININE-BSD FRML MDRD: 70 ML/MIN/1.73
GLUCOSE BLD-MCNC: 104 MG/DL (ref 65–99)
POTASSIUM BLD-SCNC: 3.6 MMOL/L (ref 3.5–5.2)
SODIUM BLD-SCNC: 140 MMOL/L (ref 136–145)

## 2020-06-26 PROCEDURE — 36415 COLL VENOUS BLD VENIPUNCTURE: CPT

## 2020-06-26 PROCEDURE — 80048 BASIC METABOLIC PNL TOTAL CA: CPT

## 2020-07-20 ENCOUNTER — TELEMEDICINE (OUTPATIENT)
Dept: FAMILY MEDICINE CLINIC | Facility: CLINIC | Age: 60
End: 2020-07-20

## 2020-07-20 DIAGNOSIS — I10 ESSENTIAL HYPERTENSION: Primary | ICD-10-CM

## 2020-07-20 PROCEDURE — 99443 PR PHYS/QHP TELEPHONE EVALUATION 21-30 MIN: CPT | Performed by: FAMILY MEDICINE

## 2020-07-20 NOTE — PROGRESS NOTES
Subjective   Bonnie Syed is a 59 y.o. female.     Chief Complaint   Patient presents with   • Hypertension        History of Present Illness    Coronavirus pandemic telehealth visit.  Good video connection.  Patient gave informed consent through the [employer] check in process.  Her audio was not working, so we did the audio portion by telephone.    Follow-up hypertension.  She is having much less stressors.  She is seeing her psychiatrist now.  Her blood pressures are running mostly all normal.  She taking metoprolol extended release 100 mg daily and spironolactone 50 mg daily.  She is describing frequent urination at nighttime is keeping her awake since starting the spironolactone and also having trouble reportedly getting blood draws when she has her Remicade infusions.  She states she had blood work done recently through her rheumatologist and her potassium was normal.  She is not on potassium supplementation.  She otherwise feels well.  She states she is doing much better since seeing the psychiatrist and stressors are less.  She is taking buspirone.        The following portions of the patient's history were reviewed and updated as appropriate: allergies, current medications, past family history, past medical history, past social history, past surgical history and problem list.          Review of Systems   Constitutional: Negative.    Neurological: Negative.    Psychiatric/Behavioral: Negative.        Objective   There were no vitals taken for this visit.  Physical Exam   Constitutional: She is oriented to person, place, and time.   HENT:   Head: Atraumatic.   Neck: Normal range of motion.   Pulmonary/Chest: Effort normal. No respiratory distress.   Neurological: She is alert and oriented to person, place, and time. Coordination normal.   Skin: No pallor.   Psychiatric: She has a normal mood and affect. Her behavior is normal.       Assessment/Plan   Bonnie was seen today for hypertension.    Diagnoses and all  orders for this visit:    Essential hypertension      Hypertension.  Now with better control.  She is having some nocturia likely related to the spironolactone.  Going to discontinue the spironolactone and continue metoprolol  mg daily.  We will continue to monitor her blood pressure.  If it starts to go up she is going to let us know.  Otherwise I will see her back in November for scheduled appointment.  Note her hyperaldosterone work-up was negative.    Duration of visit 10 minutes.

## 2020-08-10 ENCOUNTER — HOSPITAL ENCOUNTER (EMERGENCY)
Facility: HOSPITAL | Age: 60
Discharge: HOME | End: 2020-08-10
Attending: EMERGENCY MEDICINE | Admitting: EMERGENCY MEDICINE

## 2020-08-10 VITALS
DIASTOLIC BLOOD PRESSURE: 104 MMHG | BODY MASS INDEX: 20.83 KG/M2 | HEIGHT: 64 IN | WEIGHT: 122 LBS | RESPIRATION RATE: 16 BRPM | SYSTOLIC BLOOD PRESSURE: 170 MMHG | TEMPERATURE: 99.4 F | OXYGEN SATURATION: 98 % | HEART RATE: 93 BPM

## 2020-08-10 DIAGNOSIS — R10.9 RIGHT SIDED ABDOMINAL PAIN: Primary | ICD-10-CM

## 2020-08-10 DIAGNOSIS — R74.8 ELEVATED LIVER ENZYMES: ICD-10-CM

## 2020-08-10 DIAGNOSIS — D72.829 LEUKOCYTOSIS, UNSPECIFIED TYPE: ICD-10-CM

## 2020-08-10 LAB
ALBUMIN SERPL-MCNC: 5.3 G/DL (ref 3.5–5.2)
ALBUMIN/GLOB SERPL: 1.7 G/DL
ALP SERPL-CCNC: 92 U/L (ref 39–117)
ALT SERPL W P-5'-P-CCNC: 41 U/L (ref 1–33)
ANION GAP SERPL CALCULATED.3IONS-SCNC: 12.7 MMOL/L (ref 5–15)
AST SERPL-CCNC: 36 U/L (ref 1–32)
BACTERIA UR QL AUTO: ABNORMAL /HPF
BASOPHILS # BLD AUTO: 0.05 10*3/MM3 (ref 0–0.2)
BASOPHILS NFR BLD AUTO: 0.3 % (ref 0–1.5)
BILIRUB SERPL-MCNC: 0.4 MG/DL (ref 0–1.2)
BILIRUB UR QL STRIP: NEGATIVE
BUN SERPL-MCNC: 17 MG/DL (ref 6–20)
BUN/CREAT SERPL: 16.8 (ref 7–25)
CALCIUM SPEC-SCNC: 10.3 MG/DL (ref 8.6–10.5)
CHLORIDE SERPL-SCNC: 96 MMOL/L (ref 98–107)
CLARITY UR: ABNORMAL
CO2 SERPL-SCNC: 22.3 MMOL/L (ref 22–29)
COLOR UR: YELLOW
CREAT SERPL-MCNC: 1.01 MG/DL (ref 0.57–1)
DEPRECATED RDW RBC AUTO: 41.7 FL (ref 37–54)
EOSINOPHIL # BLD AUTO: 0.01 10*3/MM3 (ref 0–0.4)
EOSINOPHIL NFR BLD AUTO: 0.1 % (ref 0.3–6.2)
ERYTHROCYTE [DISTWIDTH] IN BLOOD BY AUTOMATED COUNT: 13.3 % (ref 12.3–15.4)
GFR SERPL CREATININE-BSD FRML MDRD: 56 ML/MIN/1.73
GLOBULIN UR ELPH-MCNC: 3.1 GM/DL
GLUCOSE SERPL-MCNC: 120 MG/DL (ref 65–99)
GLUCOSE UR STRIP-MCNC: NEGATIVE MG/DL
HCT VFR BLD AUTO: 47.5 % (ref 34–46.6)
HGB BLD-MCNC: 15.4 G/DL (ref 12–15.9)
HGB UR QL STRIP.AUTO: ABNORMAL
HOLD SPECIMEN: NORMAL
HOLD SPECIMEN: NORMAL
HYALINE CASTS UR QL AUTO: ABNORMAL /LPF
IMM GRANULOCYTES # BLD AUTO: 0.09 10*3/MM3 (ref 0–0.05)
IMM GRANULOCYTES NFR BLD AUTO: 0.5 % (ref 0–0.5)
KETONES UR QL STRIP: NEGATIVE
LEUKOCYTE ESTERASE UR QL STRIP.AUTO: NEGATIVE
LIPASE SERPL-CCNC: 39 U/L (ref 13–60)
LYMPHOCYTES # BLD AUTO: 1.89 10*3/MM3 (ref 0.7–3.1)
LYMPHOCYTES NFR BLD AUTO: 11.3 % (ref 19.6–45.3)
MCH RBC QN AUTO: 27.8 PG (ref 26.6–33)
MCHC RBC AUTO-ENTMCNC: 32.4 G/DL (ref 31.5–35.7)
MCV RBC AUTO: 85.7 FL (ref 79–97)
MONOCYTES # BLD AUTO: 0.8 10*3/MM3 (ref 0.1–0.9)
MONOCYTES NFR BLD AUTO: 4.8 % (ref 5–12)
NEUTROPHILS NFR BLD AUTO: 13.96 10*3/MM3 (ref 1.7–7)
NEUTROPHILS NFR BLD AUTO: 83 % (ref 42.7–76)
NITRITE UR QL STRIP: NEGATIVE
NRBC BLD AUTO-RTO: 0 /100 WBC (ref 0–0.2)
PH UR STRIP.AUTO: 6.5 [PH] (ref 5–8)
PLATELET # BLD AUTO: 311 10*3/MM3 (ref 140–450)
PMV BLD AUTO: 10.7 FL (ref 6–12)
POTASSIUM SERPL-SCNC: 3.7 MMOL/L (ref 3.5–5.2)
PROT SERPL-MCNC: 8.4 G/DL (ref 6–8.5)
PROT UR QL STRIP: ABNORMAL
RBC # BLD AUTO: 5.54 10*6/MM3 (ref 3.77–5.28)
RBC # UR: ABNORMAL /HPF
REF LAB TEST METHOD: ABNORMAL
SODIUM SERPL-SCNC: 131 MMOL/L (ref 136–145)
SP GR UR STRIP: 1.01 (ref 1–1.03)
SQUAMOUS #/AREA URNS HPF: ABNORMAL /HPF
UROBILINOGEN UR QL STRIP: ABNORMAL
WBC # BLD AUTO: 16.8 10*3/MM3 (ref 3.4–10.8)
WBC UR QL AUTO: ABNORMAL /HPF
WHOLE BLOOD HOLD SPECIMEN: NORMAL
WHOLE BLOOD HOLD SPECIMEN: NORMAL

## 2020-08-10 PROCEDURE — 93005 ELECTROCARDIOGRAM TRACING: CPT

## 2020-08-10 PROCEDURE — 83690 ASSAY OF LIPASE: CPT

## 2020-08-10 PROCEDURE — 80053 COMPREHEN METABOLIC PANEL: CPT

## 2020-08-10 PROCEDURE — 85025 COMPLETE CBC W/AUTO DIFF WBC: CPT

## 2020-08-10 PROCEDURE — 93010 ELECTROCARDIOGRAM REPORT: CPT | Performed by: INTERNAL MEDICINE

## 2020-08-10 PROCEDURE — 96360 HYDRATION IV INFUSION INIT: CPT

## 2020-08-10 PROCEDURE — 81001 URINALYSIS AUTO W/SCOPE: CPT

## 2020-08-10 PROCEDURE — 36415 COLL VENOUS BLD VENIPUNCTURE: CPT

## 2020-08-10 PROCEDURE — 93005 ELECTROCARDIOGRAM TRACING: CPT | Performed by: EMERGENCY MEDICINE

## 2020-08-10 PROCEDURE — 99283 EMERGENCY DEPT VISIT LOW MDM: CPT

## 2020-08-10 RX ORDER — ONDANSETRON 8 MG/1
8 TABLET, ORALLY DISINTEGRATING ORAL EVERY 8 HOURS PRN
Qty: 15 TABLET | Refills: 0 | Status: SHIPPED | OUTPATIENT
Start: 2020-08-10 | End: 2020-11-02

## 2020-08-10 RX ORDER — SODIUM CHLORIDE 0.9 % (FLUSH) 0.9 %
10 SYRINGE (ML) INJECTION AS NEEDED
Status: DISCONTINUED | OUTPATIENT
Start: 2020-08-10 | End: 2020-08-11 | Stop reason: HOSPADM

## 2020-08-10 RX ORDER — MORPHINE SULFATE 2 MG/ML
4 INJECTION, SOLUTION INTRAMUSCULAR; INTRAVENOUS ONCE AS NEEDED
Status: DISCONTINUED | OUTPATIENT
Start: 2020-08-10 | End: 2020-08-11 | Stop reason: HOSPADM

## 2020-08-10 RX ORDER — ONDANSETRON 2 MG/ML
8 INJECTION INTRAMUSCULAR; INTRAVENOUS ONCE
Status: DISCONTINUED | OUTPATIENT
Start: 2020-08-10 | End: 2020-08-11 | Stop reason: HOSPADM

## 2020-08-10 RX ADMIN — SODIUM CHLORIDE 1000 ML: 9 INJECTION, SOLUTION INTRAVENOUS at 20:07

## 2020-08-10 NOTE — ED PROVIDER NOTES
EMERGENCY DEPARTMENT ENCOUNTER    Room Number:  22/22  Date of encounter:  8/10/2020  PCP: Pa Abbott MD    HPI:  Context: Bonnie Syed is a 59 y.o. female who presents to the ED c/o chief complaint of abdominal pain.  Patient reports she began having crampy abdominal pain this morning, right-sided, slowly migrated down to the suprapubic region.  Pain resolved and then returned, again cramping, located in suprapubic region.  Patient had 3 times emesis, nonbloody nonbilious.  Patient reports several episodes of diarrhea, no blood or mucus.  Patient denies any dysuria, no increase in urinary frequency or urgency.  No fever or systemic symptoms.  Patient reports the pain has now resolved, nausea nausea has resolved, states she currently feels better.    MEDICAL HISTORY REVIEW  Reviewed in EPIC    PAST MEDICAL HISTORY  Active Ambulatory Problems     Diagnosis Date Noted   • Rheumatoid arthritis involving both hands with positive rheumatoid factor (CMS/Formerly [facility]) 03/10/2017   • Gastroesophageal reflux disease 03/10/2017   • Essential hypertension 03/10/2017   • Osteopenia 03/10/2017   • Personal history of breast cancer 03/10/2017   • Long-term use of immunosuppressant medication 08/20/2018   • Closed displaced transverse fracture of shaft of left femur (CMS/Formerly [facility]) 11/07/2018   • Prolapse of anterior vaginal wall 12/06/2018   • Atrophic vaginitis 12/06/2018   • Toenail fungus 01/24/2019   • Status post total replacement of hip 01/25/2020   • Lichen sclerosus 12/12/2019     Resolved Ambulatory Problems     Diagnosis Date Noted   • Health care maintenance 03/10/2017   • Acute cystitis with hematuria 03/17/2017   • Vaginal yeast infection 08/07/2017   • Rash 05/11/2018     Past Medical History:   Diagnosis Date   • Allergic    • Breast cancer (CMS/Formerly [facility])    • Cancer (CMS/Formerly [facility])    • Fracture, femur (CMS/Formerly [facility]) 11/2018   • GERD (gastroesophageal reflux disease)    • Hypertension    • Reflux gastritis    • Rheumatoid arthritis  (CMS/[facility])        PAST SURGICAL HISTORY  Past Surgical History:   Procedure Laterality Date   • BREAST SURGERY      MALIGNANT   • BUNIONECTOMY     • COLONOSCOPY     • DILATATION AND CURETTAGE     • FEMUR IM NAILING/RODDING Left 11/8/2018    Procedure: FEMUR INTRAMEDULLARY NAILING/RODDING;  Surgeon: Samy Altamirano II, MD;  Location: [facility];  Service: Orthopedics   • HYSTERECTOMY     • MASTECTOMY Left 2008    MALIGNANT   • TOTAL HIP ARTHROPLASTY Left 1/25/2020    Procedure: left TOTAL HIP arthroplasty, orif femur and removal of hardware;  Surgeon: Samy Altamirano II, MD;  Location: [facility];  Service: Orthopedics   • TRAM FLAP REVISION NIPPLE RECONSTRUCTION BREAST REDUCTION Left 2008       FAMILY HISTORY  Family History   Problem Relation Age of Onset   • Hypertension Mother    • Hypertension Father    • Heart attack Maternal Grandfather    • Breast cancer Neg Hx    • Colon cancer Neg Hx    • Malig Hyperthermia Neg Hx        SOCIAL HISTORY  Social History     Socioeconomic History   • Marital status:      Spouse name: Not on file   • Number of children: Not on file   • Years of education: Not on file   • Highest education level: Not on file   Tobacco Use   • Smoking status: Never Smoker   • Smokeless tobacco: Never Used   Substance and Sexual Activity   • Alcohol use: No   • Drug use: Never   • Sexual activity: Defer       ALLERGIES  Boniva [ibandronic acid]; Monistat [miconazole]; and Sulfa antibiotics    The patient's allergies have been reviewed    REVIEW OF SYSTEMS  All systems reviewed and negative except for those discussed in HPI.     PHYSICAL EXAM  I have reviewed the triage vital signs and nursing notes.  ED Triage Vitals   Temp Heart Rate Resp BP SpO2   08/10/20 1452 08/10/20 1452 08/10/20 1452 08/10/20 1554 08/10/20 1452   99.4 °F (37.4 °C) 108 16 (!) 182/112 99 %      Temp src Heart Rate Source Patient Position BP Location FiO2 (%)   08/10/20 1452 08/10/20 1452 -- --  --   Tympanic Monitor        GENERAL: No acute distress  HENT: NCAT, PERRL, Nares patent  EYES: no scleral icterus  NECK: trachea midline, no ROM limitations  CV: regular rhythm, regular rate  RESPIRATORY: normal effort  ABDOMEN: soft, nondistended, nontender to palpation, no rebound tenderness, no guarding or rigidity  : deferred  MUSCULOSKELETAL: no deformity  NEURO: alert, moves all extremities, follows commands  SKIN: warm, dry    LAB RESULTS  Recent Results (from the past 24 hour(s))   Light Blue Top    Collection Time: 08/10/20  4:03 PM   Result Value Ref Range    Extra Tube hold for add-on    Green Top (Gel)    Collection Time: 08/10/20  4:03 PM   Result Value Ref Range    Extra Tube Hold for add-ons.    Lavender Top    Collection Time: 08/10/20  4:03 PM   Result Value Ref Range    Extra Tube hold for add-on    Gold Top - SST    Collection Time: 08/10/20  4:03 PM   Result Value Ref Range    Extra Tube Hold for add-ons.    Comprehensive Metabolic Panel    Collection Time: 08/10/20  4:03 PM   Result Value Ref Range    Glucose 120 (H) 65 - 99 mg/dL    BUN 17 6 - 20 mg/dL    Creatinine 1.01 (H) 0.57 - 1.00 mg/dL    Sodium 131 (L) 136 - 145 mmol/L    Potassium 3.7 3.5 - 5.2 mmol/L    Chloride 96 (L) 98 - 107 mmol/L    CO2 22.3 22.0 - 29.0 mmol/L    Calcium 10.3 8.6 - 10.5 mg/dL    Total Protein 8.4 6.0 - 8.5 g/dL    Albumin 5.30 (H) 3.50 - 5.20 g/dL    ALT (SGPT) 41 (H) 1 - 33 U/L    AST (SGOT) 36 (H) 1 - 32 U/L    Alkaline Phosphatase 92 39 - 117 U/L    Total Bilirubin 0.4 0.0 - 1.2 mg/dL    eGFR Non African Amer 56 (L) >60 mL/min/1.73    Globulin 3.1 gm/dL    A/G Ratio 1.7 g/dL    BUN/Creatinine Ratio 16.8 7.0 - 25.0    Anion Gap 12.7 5.0 - 15.0 mmol/L   Lipase    Collection Time: 08/10/20  4:03 PM   Result Value Ref Range    Lipase 39 13 - 60 U/L   CBC Auto Differential    Collection Time: 08/10/20  4:03 PM   Result Value Ref Range    WBC 16.80 (H) 3.40 - 10.80 10*3/mm3    RBC 5.54 (H) 3.77 - 5.28 10*6/mm3     Hemoglobin 15.4 12.0 - 15.9 g/dL    Hematocrit 47.5 (H) 34.0 - 46.6 %    MCV 85.7 79.0 - 97.0 fL    MCH 27.8 26.6 - 33.0 pg    MCHC 32.4 31.5 - 35.7 g/dL    RDW 13.3 12.3 - 15.4 %    RDW-SD 41.7 37.0 - 54.0 fl    MPV 10.7 6.0 - 12.0 fL    Platelets 311 140 - 450 10*3/mm3    Neutrophil % 83.0 (H) 42.7 - 76.0 %    Lymphocyte % 11.3 (L) 19.6 - 45.3 %    Monocyte % 4.8 (L) 5.0 - 12.0 %    Eosinophil % 0.1 (L) 0.3 - 6.2 %    Basophil % 0.3 0.0 - 1.5 %    Immature Grans % 0.5 0.0 - 0.5 %    Neutrophils, Absolute 13.96 (H) 1.70 - 7.00 10*3/mm3    Lymphocytes, Absolute 1.89 0.70 - 3.10 10*3/mm3    Monocytes, Absolute 0.80 0.10 - 0.90 10*3/mm3    Eosinophils, Absolute 0.01 0.00 - 0.40 10*3/mm3    Basophils, Absolute 0.05 0.00 - 0.20 10*3/mm3    Immature Grans, Absolute 0.09 (H) 0.00 - 0.05 10*3/mm3    nRBC 0.0 0.0 - 0.2 /100 WBC   Urinalysis With Microscopic If Indicated (No Culture) - Urine, Clean Catch    Collection Time: 08/10/20  5:05 PM   Result Value Ref Range    Color, UA Yellow Yellow, Straw    Appearance, UA Cloudy (A) Clear    pH, UA 6.5 5.0 - 8.0    Specific Gravity, UA 1.015 1.005 - 1.030    Glucose, UA Negative Negative    Ketones, UA Negative Negative    Bilirubin, UA Negative Negative    Blood, UA Large (3+) (A) Negative    Protein, UA >=300 mg/dL (3+) (A) Negative    Leuk Esterase, UA Negative Negative    Nitrite, UA Negative Negative    Urobilinogen, UA 0.2 E.U./dL 0.2 - 1.0 E.U./dL   Urinalysis, Microscopic Only - Urine, Clean Catch    Collection Time: 08/10/20  5:05 PM   Result Value Ref Range    RBC, UA Too Numerous to Count (A) None Seen, 0-2 /HPF    WBC, UA 6-12 (A) None Seen, 0-2 /HPF    Bacteria, UA None Seen None Seen /HPF    Squamous Epithelial Cells, UA 0-2 None Seen, 0-2 /HPF    Hyaline Casts, UA 0-2 None Seen /LPF    Methodology Automated Microscopy        I ordered the above labs and reviewed the results.    RADIOLOGY  No Radiology Exams Resulted Within Past 24 Hours    I ordered the above noted  radiological studies. I reviewed the images and results. I agree with the radiologist interpretation.    PROCEDURES  Procedures    MEDICATIONS GIVEN IN ER  Medications   sodium chloride 0.9 % flush 10 mL (has no administration in time range)   ondansetron (ZOFRAN) injection 8 mg (0 mg Intravenous Hold 8/10/20 2007)   morphine injection 4 mg (0 mg Intravenous Hold 8/10/20 2006)   sodium chloride 0.9 % bolus 1,000 mL (0 mL Intravenous Stopped 8/10/20 2117)       PROGRESS, DATA ANALYSIS, CONSULTS, AND MEDICAL DECISION MAKING  A complete history and physical exam have been performed.  All available laboratory and imaging results have been reviewed by myself prior to disposition.  Patient was placed in face mask in first look. Patient was wearing facemask when I entered the room and throughout our encounter. I wore full protective equipment throughout this patient encounter including a face mask, and gloves. Hand hygiene was performed before donning protective equipment and after removal when leaving the room.  MDM  After the initial H&P, I discussed pertinent information from history and physical exam with patient/family.  Discussed differential diagnosis.  Discussed plan for ED evaluation/work-up/treatment.  All questions answered.  Patient/family is agreeable with plan.  ED Course as of Aug 10 2122   Mon Aug 10, 2020   1942 Patient is tachycardic and hypertensive although denies any signs or symptoms of hypertensive emergency.  Patient reports she has a history of anxiety, states she is currently anxious, believes her heart rate and blood pressure are related to anxiety.  Patient is refusing any blood pressure medication, no anxiety medication at present.  Work-up does show significant leukocytosis, mild elevation of liver enzymes, sterile pyuria.  Concern for acute abdominal pathology, recommended CT abdomen imaging.  Patient states her symptoms have now resolved, declined CT imaging.  Patient would prefer to avoid  radiation as she has no symptoms at present.  I did discuss with patient that if she declines CT imaging, requested she allow us to feed her and observe her for.  To ensure she does well.  Patient is agreeable.    [JG]   2010 EKG independently viewed and contemporaneously interpreted by ED physician. Time: 1621.  Heart rate 104.  Interpretation: Sinus tachycardia, normal axis, left atrial enlargement, Q waves in V1 and V3, no acute ST changes.    [JG]   2120 Patient has eaten a full meal without difficulty, denies any nausea, no abdominal pain.  Patient continues to decline CT imaging.  Repeat abdominal exam: Soft, nontender, nondistended.  Patient is currently requesting discharge.  Patient will be given extensive discussion return precautions, discussed need for close follow-up with primary care physician, discharged with primary care follow-up.    [JG]   2120 The patient was reexamined.  They have had symptomatic improvement during their ED stay.  I discussed today's findings with the patient, explaining the pertinent positives and negatives from today's visit, and the plan of care.  Discussed plan for discharge as there is no emergent indication for admission.  Discussed limitation of the ED work-up and that this is to rule out life-threatening emergencies but that they could require further testing as determined by their primary care and or any referred specialist patient is agreeable and understands need for follow-up and repeat exam/testing.  Patient is aware that discharge does not mean there is nothing wrong, indicates no emergency is present, and that they must continue their care with their primary care physician and/or any referred specialist.  They were given appropriate follow-up with their primary care physician and/or specialist.  I had an extensive discussion on the expected clinical course and return precautions.  Patient understands to return to the emergency department for continuation,  worsening, or new symptoms.  I answered any of the patient's questions. Patient was discharged home in a stable condition.        [JG]      ED Course User Index  [JG] John Bourne MD       AS OF 21:22 VITALS:    BP - (!) 170/104  HR - 93  TEMP - 99.4 °F (37.4 °C) (Tympanic)  O2 SATS - 98%    DIAGNOSIS  Final diagnoses:   Right sided abdominal pain   Leukocytosis, unspecified type   Elevated liver enzymes         DISPOSITION  DISCHARGE    Patient discharged in stable condition.    Reviewed implications of results, diagnosis, meds, responsibility to follow up, warning signs and symptoms of possible worsening, potential complications and reasons to return to ER.    Patient/Family voiced understanding of above instructions.    Discussed plan for discharge, as there is no emergent indication for admission. Patient referred to primary care provider for BP management due to today's BP. Pt/family is agreeable and understands need for follow up and repeat testing.  Pt is aware that discharge does not mean that nothing is wrong but it indicates no emergency is present that requires admission and they must continue care with follow-up as given below or physician of their choice.     FOLLOW-UP  Pa Abbott MD  [address] [facility]  [address] [address] [phone]    Schedule an appointment as soon as possible for a visit   even if well    [facility]  [address] [facility] [place] [address] [phone]  Go to   If symptoms return         Medication List      New Prescriptions    ondansetron ODT 8 MG disintegrating tablet  Commonly known as:  ZOFRAN-ODT  Place 1 tablet on the tongue Every 8 (Eight) Hours As Needed for Nausea or   Vomiting.        Changed    calcium carbonate-vitamin d 600-400 MG-UNIT per tablet  Commonly known as:  Caltrate 600+D  One tab po BID for osteopenia  What changed:    how much to take  how to take this  when to take this            John Bourne MD  08/10/20 2124

## 2020-08-10 NOTE — ED TRIAGE NOTES
"Right lower abd pain strated at 0400.  She has vomited x 3 about an hour ago,  Reports \"a little bit\" of diarrhea today    Patient was placed in face mask during first look triage.  Patient was wearing a face mask throughout encounter.  I wore personal protective equipment throughout the encounter.  Hand hygiene was performed before and after patient encounter.     "

## 2020-08-11 NOTE — ED NOTES
Pt given meal tray and water. For po challenge. Pt denies any nausea or vomiting at this time. Nurse is aware.      Dennis Perkins  08/10/20 2016

## 2020-10-20 RX ORDER — METOPROLOL SUCCINATE 100 MG/1
100 TABLET, EXTENDED RELEASE ORAL DAILY
Qty: 90 TABLET | Refills: 1 | Status: SHIPPED | OUTPATIENT
Start: 2020-10-20 | End: 2021-05-26

## 2020-10-26 DIAGNOSIS — I10 ESSENTIAL HYPERTENSION: Primary | ICD-10-CM

## 2020-10-26 DIAGNOSIS — Z00.00 ROUTINE GENERAL MEDICAL EXAMINATION AT A HEALTH CARE FACILITY: ICD-10-CM

## 2020-10-27 ENCOUNTER — LAB (OUTPATIENT)
Dept: LAB | Facility: HOSPITAL | Age: 60
End: 2020-10-27

## 2020-10-27 LAB
ALBUMIN SERPL-MCNC: 5.1 G/DL (ref 3.5–5.2)
ALBUMIN/GLOB SERPL: 1.8 G/DL
ALP SERPL-CCNC: 100 U/L (ref 39–117)
ALT SERPL W P-5'-P-CCNC: 40 U/L (ref 1–33)
ANION GAP SERPL CALCULATED.3IONS-SCNC: 10.3 MMOL/L (ref 5–15)
AST SERPL-CCNC: 34 U/L (ref 1–32)
BASOPHILS # BLD AUTO: 0.06 10*3/MM3 (ref 0–0.2)
BASOPHILS NFR BLD AUTO: 0.9 % (ref 0–1.5)
BILIRUB SERPL-MCNC: 0.4 MG/DL (ref 0–1.2)
BUN SERPL-MCNC: 17 MG/DL (ref 8–23)
BUN/CREAT SERPL: 17.5 (ref 7–25)
CALCIUM SPEC-SCNC: 9.8 MG/DL (ref 8.6–10.5)
CHLORIDE SERPL-SCNC: 101 MMOL/L (ref 98–107)
CHOLEST SERPL-MCNC: 249 MG/DL (ref 0–200)
CO2 SERPL-SCNC: 25.7 MMOL/L (ref 22–29)
CREAT SERPL-MCNC: 0.97 MG/DL (ref 0.57–1)
DEPRECATED RDW RBC AUTO: 39.8 FL (ref 37–54)
EOSINOPHIL # BLD AUTO: 0.27 10*3/MM3 (ref 0–0.4)
EOSINOPHIL NFR BLD AUTO: 3.9 % (ref 0.3–6.2)
ERYTHROCYTE [DISTWIDTH] IN BLOOD BY AUTOMATED COUNT: 13 % (ref 12.3–15.4)
GFR SERPL CREATININE-BSD FRML MDRD: 59 ML/MIN/1.73
GLOBULIN UR ELPH-MCNC: 2.8 GM/DL
GLUCOSE SERPL-MCNC: 104 MG/DL (ref 65–99)
HCT VFR BLD AUTO: 44.7 % (ref 34–46.6)
HDLC SERPL-MCNC: 68 MG/DL (ref 40–60)
HGB BLD-MCNC: 14.7 G/DL (ref 12–15.9)
IMM GRANULOCYTES # BLD AUTO: 0.02 10*3/MM3 (ref 0–0.05)
IMM GRANULOCYTES NFR BLD AUTO: 0.3 % (ref 0–0.5)
LDLC SERPL CALC-MCNC: 148 MG/DL (ref 0–100)
LDLC/HDLC SERPL: 2.12 {RATIO}
LYMPHOCYTES # BLD AUTO: 2.24 10*3/MM3 (ref 0.7–3.1)
LYMPHOCYTES NFR BLD AUTO: 32.3 % (ref 19.6–45.3)
MCH RBC QN AUTO: 27.6 PG (ref 26.6–33)
MCHC RBC AUTO-ENTMCNC: 32.9 G/DL (ref 31.5–35.7)
MCV RBC AUTO: 84 FL (ref 79–97)
MONOCYTES # BLD AUTO: 0.67 10*3/MM3 (ref 0.1–0.9)
MONOCYTES NFR BLD AUTO: 9.7 % (ref 5–12)
NEUTROPHILS NFR BLD AUTO: 3.68 10*3/MM3 (ref 1.7–7)
NEUTROPHILS NFR BLD AUTO: 52.9 % (ref 42.7–76)
NRBC BLD AUTO-RTO: 0 /100 WBC (ref 0–0.2)
PLATELET # BLD AUTO: 257 10*3/MM3 (ref 140–450)
PMV BLD AUTO: 10.3 FL (ref 6–12)
POTASSIUM SERPL-SCNC: 3.9 MMOL/L (ref 3.5–5.2)
PROT SERPL-MCNC: 7.9 G/DL (ref 6–8.5)
RBC # BLD AUTO: 5.32 10*6/MM3 (ref 3.77–5.28)
SODIUM SERPL-SCNC: 137 MMOL/L (ref 136–145)
TRIGL SERPL-MCNC: 183 MG/DL (ref 0–150)
VLDLC SERPL-MCNC: 33 MG/DL (ref 5–40)
WBC # BLD AUTO: 6.94 10*3/MM3 (ref 3.4–10.8)

## 2020-10-27 PROCEDURE — 36415 COLL VENOUS BLD VENIPUNCTURE: CPT | Performed by: FAMILY MEDICINE

## 2020-10-27 PROCEDURE — 80061 LIPID PANEL: CPT | Performed by: FAMILY MEDICINE

## 2020-10-27 PROCEDURE — 85025 COMPLETE CBC W/AUTO DIFF WBC: CPT | Performed by: FAMILY MEDICINE

## 2020-10-27 PROCEDURE — 80053 COMPREHEN METABOLIC PANEL: CPT | Performed by: FAMILY MEDICINE

## 2020-10-27 NOTE — PROGRESS NOTES
The cholesterol slightly higher compared to previously but the triglycerides are improved.  The liver enzymes are minimally elevated but slightly improved compared to last visit.  We will discuss this and more at upcoming visit.

## 2020-11-02 ENCOUNTER — OFFICE VISIT (OUTPATIENT)
Dept: FAMILY MEDICINE CLINIC | Facility: CLINIC | Age: 60
End: 2020-11-02

## 2020-11-02 VITALS
DIASTOLIC BLOOD PRESSURE: 96 MMHG | HEIGHT: 64 IN | SYSTOLIC BLOOD PRESSURE: 158 MMHG | OXYGEN SATURATION: 98 % | HEART RATE: 117 BPM | WEIGHT: 129.4 LBS | BODY MASS INDEX: 22.09 KG/M2 | TEMPERATURE: 97.8 F

## 2020-11-02 DIAGNOSIS — Z00.00 HEALTH CARE MAINTENANCE: Primary | ICD-10-CM

## 2020-11-02 PROCEDURE — 99396 PREV VISIT EST AGE 40-64: CPT | Performed by: FAMILY MEDICINE

## 2020-11-02 RX ORDER — PANTOPRAZOLE SODIUM 20 MG/1
TABLET, DELAYED RELEASE ORAL
COMMUNITY
Start: 2020-10-22 | End: 2020-11-19

## 2020-11-02 RX ORDER — BUSPIRONE HYDROCHLORIDE 10 MG/1
10 TABLET ORAL AS NEEDED
COMMUNITY
End: 2022-10-18 | Stop reason: SDUPTHER

## 2020-11-02 NOTE — PROGRESS NOTES
"Torres Syed is a 60 y.o. female.     Chief Complaint   Patient presents with   • Annual Exam        History of Present Illness      Patient is here for annual preventative health visit.  She is getting some exercise.  She continues her physical therapy twice a week.  She had a pathological hip fracture after reported Boniva use a number of years ago.  Required revision in January of this year.  She states she is ambulating better.  She continues her physical therapy.  Never smoker.  No alcohol use.    Rheumatoid arthritis.  Longstanding.  She continues leflunomide and Remicade infusions monthly.  She is scheduled for one in a couple of days.    Hypertension.  She continues on metoprolol 100 mg daily, she does well with it she states.  Her blood pressure runs normal at home but she states she is very anxious today and her blood pressure is elevated.    She was in the emergency room about a month ago with some abdominal pain that resolved.    She continues to have trouble with anxiety.  She is on as needed buspirone prescribed by her psychiatrist.    The following portions of the patient's history were reviewed and updated as appropriate: allergies, current medications, past family history, past medical history, past social history, past surgical history and problem list.          Review of Systems   Constitutional: Negative.    HENT: Negative.    Eyes: Negative.    Respiratory: Negative.    Cardiovascular: Negative.    Gastrointestinal: Negative.  Negative for blood in stool.        No trouble swallowing.  No severe GERD symptoms.   Endocrine: Negative.    Genitourinary: Negative.  Negative for hematuria.   Musculoskeletal: Positive for arthralgias.   Skin: Negative.    Psychiatric/Behavioral: Negative.    All other systems reviewed and are negative.      Objective   Blood pressure 158/96, pulse 117, temperature 97.8 °F (36.6 °C), temperature source Temporal, height 162.6 cm (64.02\"), weight 58.7 kg (129 " lb 6.4 oz), SpO2 98 %.  Physical Exam  Constitutional:       Appearance: Normal appearance.   HENT:      Head: Atraumatic.   Eyes:      Conjunctiva/sclera: Conjunctivae normal.   Neck:      Musculoskeletal: Normal range of motion and neck supple. No muscular tenderness.   Cardiovascular:      Rate and Rhythm: Normal rate and regular rhythm.      Pulses: Normal pulses.      Heart sounds: Normal heart sounds.   Pulmonary:      Effort: Pulmonary effort is normal.      Breath sounds: Normal breath sounds.   Abdominal:      General: Abdomen is flat. There is no distension.      Palpations: Abdomen is soft. There is no mass.      Tenderness: There is no abdominal tenderness.      Hernia: No hernia is present.   Musculoskeletal: Normal range of motion.         General: Deformity ( Ulnar deviation noted on a number of digits in her hands.) present.   Lymphadenopathy:      Cervical: No cervical adenopathy.   Skin:     General: Skin is warm and dry.      Findings: No rash.   Neurological:      General: No focal deficit present.      Mental Status: She is alert and oriented to person, place, and time.   Psychiatric:         Mood and Affect: Mood normal.         Behavior: Behavior normal.         Assessment/Plan   Diagnoses and all orders for this visit:    1. Health care maintenance (Primary)      Annual health care maintenance exam.    Immunizations.  Patient declined flu vaccine today.  She states she will get a Pneumovax 23 at her pharmacy in 2 weeks after she has her Remicade infusion.    Hypertension.  Elevated blood pressure today.  Patient is visibly anxious.  She states her blood pressures running normal at home.  She will continue to monitor.  I will see her back in 6 months for blood pressure check.    Rheumatoid arthritis.  She continues to follow with her rheumatologist.    Anxiety.  Stable.    Colonoscopy up-to-date 8 years ago.  She states it was a 10-year follow-up.    She continues to follow with her  gynecologist.  She has a mammogram scheduled for later this month.    Transaminases are minimally elevated today.  They have been elevated on and off for a number of years now.  She states she continues to be followed by her rheumatologist and they are monitoring the numbers.    Other preventative health practices discussed including regular exercise.

## 2020-11-19 RX ORDER — PANTOPRAZOLE SODIUM 40 MG/1
TABLET, DELAYED RELEASE ORAL
Qty: 90 TABLET | Refills: 1 | Status: SHIPPED | OUTPATIENT
Start: 2020-11-19 | End: 2021-05-26

## 2021-02-23 ENCOUNTER — TELEPHONE (OUTPATIENT)
Dept: FAMILY MEDICINE CLINIC | Facility: CLINIC | Age: 61
End: 2021-02-23

## 2021-02-23 NOTE — TELEPHONE ENCOUNTER
Caller: Bonnie Syed    Relationship to patient: Self    Best call back number: [phone]    Patient is needing: Patient called in and would like to do a virtual visit. She does not want to come in the office at all and she needs a 6 month follow up in May. Please call patient and advise.

## 2021-05-18 ENCOUNTER — TELEMEDICINE (OUTPATIENT)
Dept: FAMILY MEDICINE CLINIC | Facility: CLINIC | Age: 61
End: 2021-05-18

## 2021-05-18 DIAGNOSIS — I10 ESSENTIAL HYPERTENSION: Primary | ICD-10-CM

## 2021-05-18 PROCEDURE — 99213 OFFICE O/P EST LOW 20 MIN: CPT | Performed by: FAMILY MEDICINE

## 2021-05-18 NOTE — PROGRESS NOTES
Chief Complaint  Hypertension    Subjective          Bonnie ROSIO Syed presents to [facility]  History of Present Illness       Coronavirus pandemic telehealth visit.  Good audio and video connection.  Patient gave informed consent through the [employer] check in process.    Follow-up hypertension. Patient continues metoprolol without complaint. 100 mg daily. Blood pressure running normal typically when she has her infusions with her rheumatologist. She has no cardiovascular symptoms. No lightheadedness. No dizziness. And has no concerns about her health today. Reviewed lab work. She has lab work done periodically through her rheumatologist, I do not have copies. Her LFTs were just minimally elevated late last year.    Objective   Vital Signs:   There were no vitals taken for this visit.    Physical Exam  HENT:      Head: Atraumatic.   Pulmonary:      Effort: Pulmonary effort is normal. No respiratory distress.   Musculoskeletal:      Cervical back: Normal range of motion.   Skin:     Coloration: Skin is not pale.   Neurological:      Mental Status: She is alert and oriented to person, place, and time.      Coordination: Coordination normal.   Psychiatric:         Behavior: Behavior normal.        Result Review :   The following data was reviewed by: Pa Abbott MD on 05/18/2021:  Common labs    Common Labsle 6/26/20 8/10/20 8/10/20 10/27/20 10/27/20 10/27/20     1603 1603 0803 0803 0803   Glucose 104 (A)  120 (A)  104 (A)    BUN 16  17  17    Creatinine 0.83  1.01 (A)  0.97    eGFR Non African Am 70  56 (A)  59 (A)    Sodium 140  131 (A)  137    Potassium 3.6  3.7  3.9    Chloride 103  96 (A)  101    Calcium 10.0  10.3  9.8    Albumin   5.30 (A)  5.10    Total Bilirubin   0.4  0.4    Alkaline Phosphatase   92  100    AST (SGOT)   36 (A)  34 (A)    ALT (SGPT)   41 (A)  40 (A)    WBC  16.80 (A)  6.94     Hemoglobin  15.4  14.7     Hematocrit  47.5 (A)  44.7     Platelets  311  257     Total  Cholesterol      249 (A)   Triglycerides      183 (A)   HDL Cholesterol      68 (A)   LDL Cholesterol       148 (A)   (A) Abnormal value                      Assessment and Plan    Diagnoses and all orders for this visit:    1. Essential hypertension (Primary)      6 months follow-up hypertension. Continue metoprolol. Recommend checking blood pressure at home more often. I have also asked her to have any lab work forwarded to me from her rheumatologist. I will make sure that her liver enzymes remain stable. Otherwise I will see her back in 6 months for annual complete physical examination in office.    Total duration of visit approximately 10 to 12 minutes.      Follow Up   No follow-ups on file.  Patient was given instructions and counseling regarding her condition or for health maintenance advice. Please see specific information pulled into the AVS if appropriate.

## 2021-05-26 RX ORDER — PANTOPRAZOLE SODIUM 40 MG/1
TABLET, DELAYED RELEASE ORAL
Qty: 90 TABLET | Refills: 1 | Status: SHIPPED | OUTPATIENT
Start: 2021-05-26 | End: 2021-11-04 | Stop reason: SDUPTHER

## 2021-05-26 RX ORDER — METOPROLOL SUCCINATE 100 MG/1
100 TABLET, EXTENDED RELEASE ORAL DAILY
Qty: 90 TABLET | Refills: 1 | Status: SHIPPED | OUTPATIENT
Start: 2021-05-26 | End: 2021-12-08 | Stop reason: SDUPTHER

## 2021-08-03 ENCOUNTER — TELEPHONE (OUTPATIENT)
Dept: FAMILY MEDICINE CLINIC | Facility: CLINIC | Age: 61
End: 2021-08-03

## 2021-08-03 DIAGNOSIS — M05.742 RHEUMATOID ARTHRITIS INVOLVING BOTH HANDS WITH POSITIVE RHEUMATOID FACTOR (HCC): Primary | ICD-10-CM

## 2021-08-03 DIAGNOSIS — M05.741 RHEUMATOID ARTHRITIS INVOLVING BOTH HANDS WITH POSITIVE RHEUMATOID FACTOR (HCC): Primary | ICD-10-CM

## 2021-08-03 NOTE — TELEPHONE ENCOUNTER
We can place the referral, but it is up to the [facility] people whether or not they want to accept her.

## 2021-08-03 NOTE — TELEPHONE ENCOUNTER
Patient is currently seeing [facility] .    Patient would a referral to [facility].  Patient does not want [facility] aware that she will be going to a new provider    [facility] Fax [phone]

## 2021-08-23 ENCOUNTER — OFFICE VISIT (OUTPATIENT)
Dept: FAMILY MEDICINE CLINIC | Facility: CLINIC | Age: 61
End: 2021-08-23

## 2021-08-23 VITALS
WEIGHT: 134.5 LBS | TEMPERATURE: 97.3 F | HEART RATE: 102 BPM | DIASTOLIC BLOOD PRESSURE: 88 MMHG | SYSTOLIC BLOOD PRESSURE: 154 MMHG | BODY MASS INDEX: 22.96 KG/M2 | HEIGHT: 64 IN | OXYGEN SATURATION: 97 %

## 2021-08-23 DIAGNOSIS — N76.0 ACUTE VAGINITIS: ICD-10-CM

## 2021-08-23 DIAGNOSIS — R35.0 FREQUENT URINATION: Primary | ICD-10-CM

## 2021-08-23 LAB
BILIRUB BLD-MCNC: NEGATIVE MG/DL
CLARITY, POC: ABNORMAL
COLOR UR: YELLOW
GLUCOSE UR STRIP-MCNC: NEGATIVE MG/DL
KETONES UR QL: NEGATIVE
LEUKOCYTE EST, POC: ABNORMAL
NITRITE UR-MCNC: NEGATIVE MG/ML
PH UR: 6.5 [PH] (ref 5–8)
PROT UR STRIP-MCNC: NEGATIVE MG/DL
RBC # UR STRIP: ABNORMAL /UL
SP GR UR: 1 (ref 1–1.03)
UROBILINOGEN UR QL: NORMAL

## 2021-08-23 PROCEDURE — 99213 OFFICE O/P EST LOW 20 MIN: CPT | Performed by: NURSE PRACTITIONER

## 2021-08-23 PROCEDURE — 81003 URINALYSIS AUTO W/O SCOPE: CPT | Performed by: NURSE PRACTITIONER

## 2021-08-23 RX ORDER — FLUCONAZOLE 150 MG/1
150 TABLET ORAL ONCE
Qty: 1 TABLET | Refills: 1 | Status: SHIPPED | OUTPATIENT
Start: 2021-08-23 | End: 2021-08-23

## 2021-08-23 RX ORDER — CLOBETASOL PROPIONATE 0.5 MG/G
1 OINTMENT TOPICAL AS NEEDED
COMMUNITY
Start: 2021-08-12 | End: 2022-02-10

## 2021-08-23 RX ORDER — AMOXICILLIN AND CLAVULANATE POTASSIUM 875; 125 MG/1; MG/1
1 TABLET, FILM COATED ORAL 2 TIMES DAILY
Qty: 14 TABLET | Refills: 0 | Status: SHIPPED | OUTPATIENT
Start: 2021-08-23 | End: 2021-08-27 | Stop reason: SDUPTHER

## 2021-08-23 NOTE — PROGRESS NOTES
"Chief Complaint  Urinary Frequency (c/o frequent urination, buring with urination )    Subjective          Bonnie Syed presents to [facility]  History of Present Illness Here for dysuria and bladder spasms that began last Wednesday.  They seem to be worsening.  She recently was switched from Remicade to another biologic and reports she tends to get UTIs post infusion.      Last UTI was July 4th and required a couple different abx to resolve. Initially on keflex and was changed due to cx to macrobid.  She developed facial rash on that and was switched to augmentin which seemed to resolve the infection.     She is requesting abx to take prior to infusions to avoid future UTIs.      Denies fever, chills, n/v/d.  She has some suprapubic tenderness and denies flank pain.      Objective   Vital Signs:   /88   Pulse 102   Temp 97.3 °F (36.3 °C)   Ht 162.6 cm (64\")   Wt 61 kg (134 lb 8 oz)   SpO2 97%   BMI 23.09 kg/m²     Physical Exam  Vitals and nursing note reviewed.   Constitutional:       General: She is not in acute distress.     Appearance: She is well-developed. She is not ill-appearing or diaphoretic.   HENT:      Head: Normocephalic and atraumatic.   Eyes:      General:         Right eye: No discharge.         Left eye: No discharge.      Conjunctiva/sclera: Conjunctivae normal.   Cardiovascular:      Rate and Rhythm: Normal rate and regular rhythm.   Pulmonary:      Effort: Pulmonary effort is normal.      Breath sounds: Normal breath sounds.   Abdominal:      General: Bowel sounds are normal.      Palpations: Abdomen is soft.      Tenderness: There is no abdominal tenderness. There is no right CVA tenderness or left CVA tenderness.   Musculoskeletal:         General: No deformity.      Comments: Ambulates with cane   Skin:     General: Skin is warm and dry.   Neurological:      General: No focal deficit present.      Mental Status: She is alert and oriented to person, " place, and time.   Psychiatric:         Mood and Affect: Mood normal.         Behavior: Behavior normal.        Result Review :                 Assessment and Plan    Diagnoses and all orders for this visit:    1. Frequent urination (Primary)  -     POCT urinalysis dipstick, automated  -     amoxicillin-clavulanate (AUGMENTIN) 875-125 MG per tablet; Take 1 tablet by mouth 2 (Two) Times a Day.  Dispense: 14 tablet; Refill: 0    2. Acute vaginitis  -     fluconazole (DIFLUCAN) 150 MG tablet; Take 1 tablet by mouth 1 (One) Time for 1 dose.  Dispense: 1 tablet; Refill: 1      UA c/w UTI.  Will start augmentin which she prevoiusly neris and change if needed based on c&s.  Side effects reviewed.  Adequate hydration.  We discussed s/s requiring emergent tx.     Recommend checking with rheumatologist to see if he thinks she should start prophylaxis abx with infusions.  F/U with PCP to further discuss if needed.              Follow Up   Return if symptoms worsen or fail to improve.  Patient was given instructions and counseling regarding her condition or for health maintenance advice. Please see specific information pulled into the AVS if appropriate.

## 2021-08-23 NOTE — PATIENT INSTRUCTIONS

## 2021-08-26 LAB
BACTERIA UR CULT: ABNORMAL
BACTERIA UR CULT: ABNORMAL
OTHER ANTIBIOTIC SUSC ISLT: ABNORMAL

## 2021-08-27 ENCOUNTER — TELEMEDICINE (OUTPATIENT)
Dept: FAMILY MEDICINE CLINIC | Facility: CLINIC | Age: 61
End: 2021-08-27

## 2021-08-27 DIAGNOSIS — R35.0 FREQUENT URINATION: ICD-10-CM

## 2021-08-27 PROCEDURE — 99213 OFFICE O/P EST LOW 20 MIN: CPT | Performed by: FAMILY MEDICINE

## 2021-08-27 RX ORDER — FLUCONAZOLE 150 MG/1
150 TABLET ORAL ONCE
Qty: 1 TABLET | Refills: 2 | Status: SHIPPED | OUTPATIENT
Start: 2021-08-27 | End: 2021-08-27

## 2021-08-27 RX ORDER — AMOXICILLIN AND CLAVULANATE POTASSIUM 875; 125 MG/1; MG/1
1 TABLET, FILM COATED ORAL 2 TIMES DAILY
Qty: 10 TABLET | Refills: 2 | Status: SHIPPED | OUTPATIENT
Start: 2021-08-27 | End: 2021-11-04 | Stop reason: ALTCHOICE

## 2021-08-27 NOTE — PROGRESS NOTES
Chief Complaint  Urinary Tract Infection    Subjective          Bonnie H Kunal presents to [facility]  History of Present Illness     Coronavirus pandemic telehealth visit.  Good audio and video connection.  Patient gave informed consent through the mojio check in process.    Recurrent UTI symptoms.  She is on a new biological infusion monthly.  Prescribed by rheumatology for rheumatoid arthritis.  She had a UTI through the [facility] system reportedly in July.  Was placed on a couple of antibiotics.  She was placed on Macrobid and a cause of face rash.  She was put on Keflex for few weeks.  Eventually things got better.  I do not have any these records I will check through the computer system.    Then she had another infusion a few weeks ago.  Developed another urinary tract infection with burning urgency and typical symptoms for her.  No fever.  She was seen by our nurse practitioner.  Placed on Augmentin.  Urine culture demonstrated E. coli sensitive to Augmentin resistant to ampicillin and resistant to sulfa, she is allergic to sulfa.    She currently is finishing up her Augmentin which is helping.  No fever.  Otherwise feels well.  Her rheumatologist is asked that she be placed on antibiotics during every infusion.  However she does not always have urinary tract symptoms related to these infusions.    Objective   Vital Signs:   There were no vitals taken for this visit.    Physical Exam  Constitutional:       Appearance: Normal appearance.   Neurological:      Mental Status: She is alert.        Result Review :                 Assessment and Plan    Diagnoses and all orders for this visit:    1. Frequent urination  -     amoxicillin-clavulanate (AUGMENTIN) 875-125 MG per tablet; Take 1 tablet by mouth 2 (Two) Times a Day.  Dispense: 10 tablet; Refill: 2    Other orders  -     fluconazole (DIFLUCAN) 150 MG tablet; Take 1 tablet by mouth 1 (One) Time for 1 dose. For vaginal yeast  infection  Dispense: 1 tablet; Refill: 2      Dysuria.  Recurrent.  Most recently probable urinary tract infection with urine culture growing out E. coli.  Sensitive to Augmentin.  At this time I am not recommending Augmentin prophylaxis prior to every infusion.  She does not always get the urinary infections with the infusions and they have been uncomplicated.  I am concerned about development of bacterial resistance.  Patient is also aware that dysuria does not necessarily mean bacterial bladder or urinary infection.  At this point I am prescribing Augmentin twice a day for 5 days for future severe dysuria symptoms.  Seek medical attention with severe symptoms such as fever flank pain abdominal pain or other concerns.  If possible, get to a provider before using.  I will see her back otherwise as scheduled.    Duration of visit 15 minutes      Follow Up   No follow-ups on file.  Patient was given instructions and counseling regarding her condition or for health maintenance advice. Please see specific information pulled into the AVS if appropriate.

## 2021-09-13 ENCOUNTER — TELEMEDICINE (OUTPATIENT)
Dept: FAMILY MEDICINE CLINIC | Facility: CLINIC | Age: 61
End: 2021-09-13

## 2021-09-13 DIAGNOSIS — R39.9 LOWER URINARY TRACT SYMPTOMS (LUTS): Primary | ICD-10-CM

## 2021-09-13 PROCEDURE — 99213 OFFICE O/P EST LOW 20 MIN: CPT | Performed by: FAMILY MEDICINE

## 2021-09-13 RX ORDER — NITROFURANTOIN 25; 75 MG/1; MG/1
100 CAPSULE ORAL 2 TIMES DAILY
Qty: 10 CAPSULE | Refills: 1 | Status: SHIPPED | OUTPATIENT
Start: 2021-09-13 | End: 2021-09-27 | Stop reason: SDUPTHER

## 2021-09-13 RX ORDER — PHENAZOPYRIDINE HYDROCHLORIDE 200 MG/1
200 TABLET, FILM COATED ORAL 3 TIMES DAILY PRN
Qty: 30 TABLET | Refills: 0 | Status: SHIPPED | OUTPATIENT
Start: 2021-09-13 | End: 2022-02-10

## 2021-09-13 NOTE — PROGRESS NOTES
Chief Complaint  Urinary Tract Infection    Subjective          Bonnie H Kunal presents to [facility]  History of Present Illness    Coronavirus pandemic telehealth visit.  Good audio and video connection.  Patient gave informed consent through the [employer] check in process.    Recurrent UTI symptoms.  Burning with urination.  Frequent urination.  No fever.  She is had this before.  Late August she had a urine culture with E. coli sensitive to Augmentin and nitrofurantoin.  She took Augmentin last week and the symptoms got better and other coming back a couple days ago.  No flank pain.  She otherwise feels well with no vomiting.  She has not had a recent rheumatological infusion.    Objective   Vital Signs:   There were no vitals taken for this visit.    Physical Exam  Constitutional:       Comments: Patient appears no acute distress        Result Review :                 Assessment and Plan    Diagnoses and all orders for this visit:    1. Lower urinary tract symptoms (LUTS) (Primary)    Other orders  -     nitrofurantoin, macrocrystal-monohydrate, (Macrobid) 100 MG capsule; Take 1 capsule by mouth 2 (Two) Times a Day.  Dispense: 10 capsule; Refill: 1      Lower urinary tract symptoms.  Recurrent UTI probable.  Possible overactive bladder.  Stop Augmentin before diarrhea develops.  Switching to nitrofurantoin 500 mg twice a day for 5 days.  1 refill if needed.  She understands with severe symptoms such as flank pain fever vomiting or other severe concerns she will seek medical attention immediately.  She is aware that nitrofurantoin is only good against bladder infections and not kidney infections.  If symptoms continue to be recurrent, she will need to be seen in the office for further evaluation, examination, and possible further urine studies.  Or referral to urology.    Duration of visit approximately 15 minutes total      Follow Up   No follow-ups on file.  Patient was given  instructions and counseling regarding her condition or for health maintenance advice. Please see specific information pulled into the AVS if appropriate.

## 2021-09-27 ENCOUNTER — TELEPHONE (OUTPATIENT)
Dept: FAMILY MEDICINE CLINIC | Facility: CLINIC | Age: 61
End: 2021-09-27

## 2021-09-27 RX ORDER — NITROFURANTOIN 25; 75 MG/1; MG/1
100 CAPSULE ORAL 2 TIMES DAILY
Qty: 10 CAPSULE | Refills: 1 | Status: SHIPPED | OUTPATIENT
Start: 2021-09-27 | End: 2021-11-04 | Stop reason: ALTCHOICE

## 2021-09-28 RX ORDER — FLUCONAZOLE 150 MG/1
150 TABLET ORAL ONCE
Qty: 1 TABLET | Refills: 1 | Status: SHIPPED | OUTPATIENT
Start: 2021-09-28 | End: 2021-09-28

## 2021-11-04 ENCOUNTER — OFFICE VISIT (OUTPATIENT)
Dept: FAMILY MEDICINE CLINIC | Facility: CLINIC | Age: 61
End: 2021-11-04

## 2021-11-04 VITALS
TEMPERATURE: 97.5 F | WEIGHT: 138 LBS | DIASTOLIC BLOOD PRESSURE: 98 MMHG | HEIGHT: 64 IN | OXYGEN SATURATION: 96 % | SYSTOLIC BLOOD PRESSURE: 160 MMHG | HEART RATE: 88 BPM | BODY MASS INDEX: 23.56 KG/M2

## 2021-11-04 DIAGNOSIS — E78.00 HIGH CHOLESTEROL: ICD-10-CM

## 2021-11-04 DIAGNOSIS — K21.00 GASTROESOPHAGEAL REFLUX DISEASE WITH ESOPHAGITIS, UNSPECIFIED WHETHER HEMORRHAGE: ICD-10-CM

## 2021-11-04 DIAGNOSIS — I10 ESSENTIAL HYPERTENSION: Primary | ICD-10-CM

## 2021-11-04 PROCEDURE — 99213 OFFICE O/P EST LOW 20 MIN: CPT | Performed by: FAMILY MEDICINE

## 2021-11-04 RX ORDER — FLUCONAZOLE 150 MG/1
TABLET ORAL
COMMUNITY
Start: 2021-11-03 | End: 2021-12-15

## 2021-11-04 RX ORDER — PANTOPRAZOLE SODIUM 20 MG/1
20 TABLET, DELAYED RELEASE ORAL DAILY
Start: 2021-11-04 | End: 2022-01-03 | Stop reason: SDUPTHER

## 2021-11-04 NOTE — PROGRESS NOTES
"Chief Complaint  Establish Care    Subjective          Bonnie Syed presents to [facility]  History of Present Illness  HTN BP elevated has White coat Sx, had many Sx, 3 years ago, Femur broke and Hip replacement, using cane, on PT , get UTI often, seen by Urologst on Augmentin x 7 days, on Ramicade, seen by Rheumatologist , RA no fx hx, no DM in family, non smoker, no flu shot, + COVID vaccine, Colonoscopy due next Summer, normal 9 years ago, no fxhx, MY one side Breast cancer per GYN, R remains,last Pap had Hystererctomy, blood pressure at home runs good-120/80, patient uses BuSpar as needed, not daily, very nice patient, no chest pain or shortness of breath  Objective   Vital Signs:   /98 (BP Location: Right arm, Patient Position: Sitting)   Pulse 88   Temp 97.5 °F (36.4 °C)   Ht 162.6 cm (64.02\")   Wt 62.6 kg (138 lb)   SpO2 96%   BMI 23.68 kg/m²     Physical Exam  Vitals and nursing note reviewed.   Constitutional:       Appearance: She is well-developed.   HENT:      Head: Normocephalic and atraumatic.      Right Ear: Tympanic membrane, ear canal and external ear normal.      Left Ear: Tympanic membrane, ear canal and external ear normal.      Nose: Nose normal.   Eyes:      General: No scleral icterus.        Right eye: No discharge.         Left eye: No discharge.      Conjunctiva/sclera: Conjunctivae normal.      Pupils: Pupils are equal, round, and reactive to light.   Neck:      Thyroid: No thyromegaly.      Vascular: No JVD.      Trachea: No tracheal deviation.   Cardiovascular:      Rate and Rhythm: Normal rate and regular rhythm.      Heart sounds: Normal heart sounds. No murmur heard.  No friction rub. No gallop.    Pulmonary:      Effort: Pulmonary effort is normal. No respiratory distress.      Breath sounds: Normal breath sounds. No wheezing or rales.   Chest:      Chest wall: No tenderness.   Abdominal:      General: Bowel sounds are normal. There is no " distension.      Palpations: Abdomen is soft. There is no mass.      Tenderness: There is no abdominal tenderness. There is no guarding or rebound.      Hernia: No hernia is present.   Musculoskeletal:         General: No tenderness. Normal range of motion.      Cervical back: Normal range of motion and neck supple.      Comments: Uses a cane for ambulation   Lymphadenopathy:      Cervical: No cervical adenopathy.   Skin:     General: Skin is warm and dry.   Neurological:      General: No focal deficit present.      Mental Status: She is alert.      Cranial Nerves: No cranial nerve deficit.      Sensory: No sensory deficit.      Motor: No abnormal muscle tone.      Coordination: Coordination normal.      Deep Tendon Reflexes: Reflexes normal.   Psychiatric:         Mood and Affect: Mood normal.         Behavior: Behavior normal.         Thought Content: Thought content normal.         Judgment: Judgment normal.        Result Review :                 Assessment and Plan    Diagnoses and all orders for this visit:    1. Essential hypertension (Primary)  Comments:  Dash diet, continue med, monitor BP  Orders:  -     CBC & Differential; Future  -     Comprehensive Metabolic Panel; Future  -     Lipid Panel; Future    2. High cholesterol  -     CBC & Differential; Future  -     Comprehensive Metabolic Panel; Future  -     Lipid Panel; Future    3. Gastroesophageal reflux disease with esophagitis, unspecified whether hemorrhage  -     pantoprazole (PROTONIX) 20 MG EC tablet; Take 1 tablet by mouth Daily.        Follow Up   Return in about 3 months (around 2/4/2022).  Patient was given instructions and counseling regarding her condition or for health maintenance advice. Please see specific information pulled into the AVS if appropriate.     Decrease pantoprazole to 20 mg to avoid any problems with the bones,  Monitor blood pressure  BP recheck is 172/98

## 2021-11-11 DIAGNOSIS — E78.00 HIGH CHOLESTEROL: ICD-10-CM

## 2021-11-11 DIAGNOSIS — I10 ESSENTIAL HYPERTENSION: ICD-10-CM

## 2021-11-12 LAB
ALBUMIN SERPL-MCNC: 4.8 G/DL (ref 3.8–4.8)
ALBUMIN/GLOB SERPL: 2 {RATIO} (ref 1.2–2.2)
ALP SERPL-CCNC: 88 IU/L (ref 44–121)
ALT SERPL-CCNC: 31 IU/L (ref 0–32)
AST SERPL-CCNC: 35 IU/L (ref 0–40)
BASOPHILS # BLD AUTO: 0.1 X10E3/UL (ref 0–0.2)
BASOPHILS NFR BLD AUTO: 1 %
BILIRUB SERPL-MCNC: 0.4 MG/DL (ref 0–1.2)
BUN SERPL-MCNC: 14 MG/DL (ref 8–27)
BUN/CREAT SERPL: 18 (ref 12–28)
CALCIUM SERPL-MCNC: 9.5 MG/DL (ref 8.7–10.3)
CHLORIDE SERPL-SCNC: 98 MMOL/L (ref 96–106)
CHOLEST SERPL-MCNC: 207 MG/DL (ref 100–199)
CO2 SERPL-SCNC: 22 MMOL/L (ref 20–29)
CREAT SERPL-MCNC: 0.78 MG/DL (ref 0.57–1)
EOSINOPHIL # BLD AUTO: 0.5 X10E3/UL (ref 0–0.4)
EOSINOPHIL NFR BLD AUTO: 7 %
ERYTHROCYTE [DISTWIDTH] IN BLOOD BY AUTOMATED COUNT: 12.7 % (ref 11.7–15.4)
GLOBULIN SER CALC-MCNC: 2.4 G/DL (ref 1.5–4.5)
GLUCOSE SERPL-MCNC: 90 MG/DL (ref 65–99)
HCT VFR BLD AUTO: 43.9 % (ref 34–46.6)
HDLC SERPL-MCNC: 54 MG/DL
HGB BLD-MCNC: 13.9 G/DL (ref 11.1–15.9)
IMM GRANULOCYTES # BLD AUTO: 0 X10E3/UL (ref 0–0.1)
IMM GRANULOCYTES NFR BLD AUTO: 0 %
LDLC SERPL CALC-MCNC: 134 MG/DL (ref 0–99)
LYMPHOCYTES # BLD AUTO: 2.2 X10E3/UL (ref 0.7–3.1)
LYMPHOCYTES NFR BLD AUTO: 28 %
MCH RBC QN AUTO: 26.5 PG (ref 26.6–33)
MCHC RBC AUTO-ENTMCNC: 31.7 G/DL (ref 31.5–35.7)
MCV RBC AUTO: 84 FL (ref 79–97)
MONOCYTES # BLD AUTO: 0.9 X10E3/UL (ref 0.1–0.9)
MONOCYTES NFR BLD AUTO: 11 %
NEUTROPHILS # BLD AUTO: 4.2 X10E3/UL (ref 1.4–7)
NEUTROPHILS NFR BLD AUTO: 53 %
PLATELET # BLD AUTO: 235 X10E3/UL (ref 150–450)
POTASSIUM SERPL-SCNC: 3.9 MMOL/L (ref 3.5–5.2)
PROT SERPL-MCNC: 7.2 G/DL (ref 6–8.5)
RBC # BLD AUTO: 5.25 X10E6/UL (ref 3.77–5.28)
SODIUM SERPL-SCNC: 136 MMOL/L (ref 134–144)
TRIGL SERPL-MCNC: 107 MG/DL (ref 0–149)
VLDLC SERPL CALC-MCNC: 19 MG/DL (ref 5–40)
WBC # BLD AUTO: 8 X10E3/UL (ref 3.4–10.8)

## 2021-12-08 ENCOUNTER — PATIENT MESSAGE (OUTPATIENT)
Dept: FAMILY MEDICINE CLINIC | Facility: CLINIC | Age: 61
End: 2021-12-08

## 2021-12-08 ENCOUNTER — TELEPHONE (OUTPATIENT)
Dept: FAMILY MEDICINE CLINIC | Facility: CLINIC | Age: 61
End: 2021-12-08

## 2021-12-08 RX ORDER — METOPROLOL SUCCINATE 100 MG/1
100 TABLET, EXTENDED RELEASE ORAL DAILY
Qty: 90 TABLET | Refills: 3 | Status: SHIPPED | OUTPATIENT
Start: 2021-12-08 | End: 2022-01-03 | Stop reason: SDUPTHER

## 2021-12-08 RX ORDER — METOPROLOL SUCCINATE 100 MG/1
100 TABLET, EXTENDED RELEASE ORAL DAILY
Qty: 90 TABLET | Refills: 3 | Status: SHIPPED | OUTPATIENT
Start: 2021-12-08 | End: 2021-12-08 | Stop reason: SDUPTHER

## 2021-12-08 NOTE — TELEPHONE ENCOUNTER
Rx Refill Note  Requested Prescriptions     Pending Prescriptions Disp Refills   • metoprolol succinate XL (TOPROL-XL) 100 MG 24 hr tablet 90 tablet 1     Sig: Take 1 tablet by mouth Daily.      Last office visit with prescribing clinician: 11/4/2021      Next office visit with prescribing clinician: 2/10/2022     Susan Russell MA  12/08/21, 09:39 EST

## 2021-12-08 NOTE — TELEPHONE ENCOUNTER
Caller: Kunal Bonnie ROSIO    Relationship: Self    Best call back number: [phone] (H)    Requested Prescriptions:   Requested Prescriptions     Pending Prescriptions Disp Refills   • metoprolol succinate XL (TOPROL-XL) 100 MG 24 hr tablet 90 tablet 3     Sig: Take 1 tablet by mouth Daily.        Pharmacy where request should be sent: [facility] - [address], [address] - [phone] ph - [phone] FX     Additional details provided by patient: [facility] A 90 DAY SUPPLY    Does the patient have less than a 3 day supply:  [] Yes  [x] No    Montse Abad Rep   12/08/21 14:32 EST

## 2021-12-08 NOTE — TELEPHONE ENCOUNTER
----- Message from Bonnie Syed sent at 12/8/2021  5:30 AM EST -----  Regarding: Needing a refill on BP meds  Will you please send in a new prescription for Metoprolol to [facility]?    Thank you!  Bonnie Syed  [phone]

## 2021-12-15 ENCOUNTER — PRE-ADMISSION TESTING (OUTPATIENT)
Dept: PREADMISSION TESTING | Facility: HOSPITAL | Age: 61
End: 2021-12-15

## 2021-12-15 VITALS
TEMPERATURE: 97.1 F | WEIGHT: 135.4 LBS | DIASTOLIC BLOOD PRESSURE: 93 MMHG | BODY MASS INDEX: 23.12 KG/M2 | HEART RATE: 94 BPM | RESPIRATION RATE: 20 BRPM | OXYGEN SATURATION: 97 % | HEIGHT: 64 IN | SYSTOLIC BLOOD PRESSURE: 151 MMHG

## 2021-12-15 LAB
ALBUMIN SERPL-MCNC: 4.7 G/DL (ref 3.5–5.2)
ALBUMIN/GLOB SERPL: 1.6 G/DL
ALP SERPL-CCNC: 93 U/L (ref 39–117)
ALT SERPL W P-5'-P-CCNC: 25 U/L (ref 1–33)
ANION GAP SERPL CALCULATED.3IONS-SCNC: 10.7 MMOL/L (ref 5–15)
AST SERPL-CCNC: 23 U/L (ref 1–32)
BILIRUB SERPL-MCNC: 0.3 MG/DL (ref 0–1.2)
BUN SERPL-MCNC: 18 MG/DL (ref 8–23)
BUN/CREAT SERPL: 25.7 (ref 7–25)
CALCIUM SPEC-SCNC: 9.7 MG/DL (ref 8.6–10.5)
CHLORIDE SERPL-SCNC: 104 MMOL/L (ref 98–107)
CO2 SERPL-SCNC: 24.3 MMOL/L (ref 22–29)
CREAT SERPL-MCNC: 0.7 MG/DL (ref 0.57–1)
DEPRECATED RDW RBC AUTO: 38.6 FL (ref 37–54)
ERYTHROCYTE [DISTWIDTH] IN BLOOD BY AUTOMATED COUNT: 12.7 % (ref 12.3–15.4)
GFR SERPL CREATININE-BSD FRML MDRD: 85 ML/MIN/1.73
GLOBULIN UR ELPH-MCNC: 3 GM/DL
GLUCOSE SERPL-MCNC: 89 MG/DL (ref 65–99)
HCT VFR BLD AUTO: 45.4 % (ref 34–46.6)
HGB BLD-MCNC: 14.7 G/DL (ref 12–15.9)
MCH RBC QN AUTO: 27.3 PG (ref 26.6–33)
MCHC RBC AUTO-ENTMCNC: 32.4 G/DL (ref 31.5–35.7)
MCV RBC AUTO: 84.4 FL (ref 79–97)
PLATELET # BLD AUTO: 232 10*3/MM3 (ref 140–450)
PMV BLD AUTO: 11.5 FL (ref 6–12)
POTASSIUM SERPL-SCNC: 4.5 MMOL/L (ref 3.5–5.2)
PROT SERPL-MCNC: 7.7 G/DL (ref 6–8.5)
QT INTERVAL: 362 MS
RBC # BLD AUTO: 5.38 10*6/MM3 (ref 3.77–5.28)
SARS-COV-2 ORF1AB RESP QL NAA+PROBE: NOT DETECTED
SODIUM SERPL-SCNC: 139 MMOL/L (ref 136–145)
WBC NRBC COR # BLD: 7.44 10*3/MM3 (ref 3.4–10.8)

## 2021-12-15 PROCEDURE — 36415 COLL VENOUS BLD VENIPUNCTURE: CPT

## 2021-12-15 PROCEDURE — 93005 ELECTROCARDIOGRAM TRACING: CPT

## 2021-12-15 PROCEDURE — C9803 HOPD COVID-19 SPEC COLLECT: HCPCS | Performed by: NURSE PRACTITIONER

## 2021-12-15 PROCEDURE — U0004 COV-19 TEST NON-CDC HGH THRU: HCPCS | Performed by: NURSE PRACTITIONER

## 2021-12-15 PROCEDURE — 80053 COMPREHEN METABOLIC PANEL: CPT

## 2021-12-15 PROCEDURE — 85027 COMPLETE CBC AUTOMATED: CPT

## 2021-12-15 PROCEDURE — 93010 ELECTROCARDIOGRAM REPORT: CPT | Performed by: INTERNAL MEDICINE

## 2021-12-15 RX ORDER — BETAMETHASONE DIPROPIONATE 0.5 MG/G
1 LOTION TOPICAL DAILY
COMMUNITY
Start: 2021-11-24 | End: 2022-02-10

## 2021-12-15 NOTE — DISCHARGE INSTRUCTIONS
Take the following medications the morning of surgery with a small sip of water:  METOPROLOL    ARRIVE TO OUTPATIENT SURGERY 12-17-21 AT 9:30AM    If you are on prescription narcotic pain medication to control your pain you may also take that medication the morning of surgery.    General Instructions:  • Do not eat or drink anything after midnight the night before surgery.  • Infants may have breast milk up to four hours before surgery.  • Infants drinking formula may drink formula up to six hours before surgery.   • Patients who avoid smoking, chewing tobacco and alcohol for 4 weeks prior to surgery have a reduced risk of post-operative complications.  Quit smoking as many days before surgery as you can.  • Do not smoke, use chewing tobacco or drink alcohol the day of surgery.   • If applicable bring your C-PAP/ BI-PAP machine.  • Bring any papers given to you in the doctor’s office.  • Wear clean comfortable clothes.  • Do not wear contact lenses, false eyelashes or make-up.  Bring a case for your glasses.   • Bring crutches or walker if applicable.  • Remove all piercings.  Leave jewelry and any other valuables at home.  • Hair extensions with metal clips must be removed prior to surgery.  • The Pre-Admission Testing nurse will instruct you to bring medications if unable to obtain an accurate list in Pre-Admission Testing.        Preventing a Surgical Site Infection:  • For 2 to 3 days before surgery, avoid shaving with a razor because the razor can irritate skin and make it easier to develop an infection.    • Any areas of open skin can increase the risk of a post-operative wound infection by allowing bacteria to enter and travel throughout the body.  Notify your surgeon if you have any skin wounds / rashes even if it is not near the expected surgical site.  The area will need assessed to determine if surgery should be delayed until it is healed.  • The night prior to surgery shower using a fresh bar of  anti-bacterial soap (such as Dial) and clean washcloth.  Sleep in a clean bed with clean clothing.  Do not allow pets to sleep with you.  • Shower on the morning of surgery using a fresh bar of anti-bacterial soap (such as Dial) and clean washcloth.  Dry with a clean towel and dress in clean clothing.  • Ask your surgeon if you will be receiving antibiotics prior to surgery.  • Make sure you, your family, and all healthcare providers clean their hands with soap and water or an alcohol based hand  before caring for you or your wound.    Day of surgery:  Your arrival time is approximately two hours before your scheduled surgery time.  Upon arrival, a Pre-op nurse and Anesthesiologist will review your health history, obtain vital signs, and answer questions you may have.  The only belongings needed at this time will be your home medications and if applicable your C-PAP/BI-PAP machine.  A Pre-op nurse will start an IV and you may receive medication in preparation for surgery, including something to help you relax.      Please be aware that surgery does come with discomfort.  We want to make every effort to control your discomfort so please discuss any uncontrolled symptoms with your nurse.   Your doctor will most likely have prescribed pain medications.      If you are going home after surgery you will receive individualized written care instructions before being discharged.  A responsible adult must drive you to and from the hospital on the day of your surgery and stay with you for 24 hours.  Discharge prescriptions can be filled by the hospital pharmacy during regular pharmacy hours.  If you are having surgery late in the day/evening your prescription may be e-prescribed to your pharmacy.  Please verify your pharmacy hours or chose a 24 hour pharmacy to avoid not having access to your prescription because your pharmacy has closed for the day.    If you are staying overnight following surgery, you will be  transported to your hospital room following the recovery period.  [facility] has all private rooms.    If you have any questions please call Pre-Admission Testing at [phone].  Deductibles and co-payments are collected on the day of service. Please be prepared to pay the required co-pay, deductible or deposit on the day of service as defined by your plan.    Patient Education for Self-Quarantine Process    • Following your COVID testing, we strongly recommend that you wear a mask when you are with other people and practice social distancing.   • Limit your activities to only required outings.  • Wash your hands with soap and water frequently for at least 20 seconds.   • Avoid touching your eyes, nose and mouth with unwashed hands.  • Do not share anything - utensils, drinking glasses, food from the same bowl.   • Sanitize household surfaces daily. Include all high touch areas (door handles, light switches, phones, countertops, etc.)    Call your surgeon immediately if you experience any of the following symptoms:  • Sore Throat  • Shortness of Breath or difficulty breathing  • Cough  • Chills  • Body soreness or muscle pain  • Headache  • Fever  • New loss of taste or smell  • Do not arrive for your surgery ill.  Your procedure will need to be rescheduled to another time.  You will need to call your physician before the day of surgery to avoid any unnecessary exposure to hospital staff as well as other patients.

## 2021-12-16 ENCOUNTER — ANESTHESIA EVENT (OUTPATIENT)
Dept: PERIOP | Facility: HOSPITAL | Age: 61
End: 2021-12-16

## 2021-12-16 RX ORDER — CEFAZOLIN SODIUM 2 G/100ML
2 INJECTION, SOLUTION INTRAVENOUS ONCE
Status: CANCELLED | OUTPATIENT
Start: 2021-12-17 | End: 2021-12-16

## 2021-12-17 ENCOUNTER — HOSPITAL ENCOUNTER (OUTPATIENT)
Facility: HOSPITAL | Age: 61
Setting detail: HOSPITAL OUTPATIENT SURGERY
Discharge: HOME | End: 2021-12-17
Attending: UROLOGY | Admitting: UROLOGY

## 2021-12-17 ENCOUNTER — ANESTHESIA (OUTPATIENT)
Dept: PERIOP | Facility: HOSPITAL | Age: 61
End: 2021-12-17

## 2021-12-17 VITALS
SYSTOLIC BLOOD PRESSURE: 156 MMHG | HEART RATE: 88 BPM | DIASTOLIC BLOOD PRESSURE: 87 MMHG | TEMPERATURE: 97.3 F | OXYGEN SATURATION: 97 % | RESPIRATION RATE: 16 BRPM

## 2021-12-17 DIAGNOSIS — N20.0 RENAL CALCULUS, RIGHT: Primary | ICD-10-CM

## 2021-12-17 PROCEDURE — 25010000002 ONDANSETRON PER 1 MG: Performed by: NURSE ANESTHETIST, CERTIFIED REGISTERED

## 2021-12-17 PROCEDURE — 0 CEFAZOLIN IN DEXTROSE 2-4 GM/100ML-% SOLUTION: Performed by: UROLOGY

## 2021-12-17 PROCEDURE — 76000 FLUOROSCOPY <1 HR PHYS/QHP: CPT

## 2021-12-17 PROCEDURE — 25010000002 PROPOFOL 10 MG/ML EMULSION: Performed by: NURSE ANESTHETIST, CERTIFIED REGISTERED

## 2021-12-17 RX ORDER — FENTANYL CITRATE 50 UG/ML
50 INJECTION, SOLUTION INTRAMUSCULAR; INTRAVENOUS
Status: DISCONTINUED | OUTPATIENT
Start: 2021-12-17 | End: 2021-12-17 | Stop reason: HOSPADM

## 2021-12-17 RX ORDER — HYDROCODONE BITARTRATE AND ACETAMINOPHEN 7.5; 325 MG/1; MG/1
1 TABLET ORAL EVERY 4 HOURS PRN
Status: DISCONTINUED | OUTPATIENT
Start: 2021-12-17 | End: 2021-12-17 | Stop reason: HOSPADM

## 2021-12-17 RX ORDER — ONDANSETRON 2 MG/ML
INJECTION INTRAMUSCULAR; INTRAVENOUS AS NEEDED
Status: DISCONTINUED | OUTPATIENT
Start: 2021-12-17 | End: 2021-12-17 | Stop reason: SURG

## 2021-12-17 RX ORDER — SODIUM CHLORIDE 9 MG/ML
INJECTION, SOLUTION INTRAVENOUS AS NEEDED
Status: DISCONTINUED | OUTPATIENT
Start: 2021-12-17 | End: 2021-12-17 | Stop reason: HOSPADM

## 2021-12-17 RX ORDER — CEPHALEXIN 500 MG/1
500 CAPSULE ORAL 3 TIMES DAILY
Qty: 12 CAPSULE | Refills: 0 | Status: SHIPPED | OUTPATIENT
Start: 2021-12-17 | End: 2021-12-21

## 2021-12-17 RX ORDER — FLUMAZENIL 0.1 MG/ML
0.2 INJECTION INTRAVENOUS AS NEEDED
Status: DISCONTINUED | OUTPATIENT
Start: 2021-12-17 | End: 2021-12-17 | Stop reason: HOSPADM

## 2021-12-17 RX ORDER — EPHEDRINE SULFATE 50 MG/ML
5 INJECTION, SOLUTION INTRAVENOUS ONCE AS NEEDED
Status: DISCONTINUED | OUTPATIENT
Start: 2021-12-17 | End: 2021-12-17 | Stop reason: HOSPADM

## 2021-12-17 RX ORDER — ONDANSETRON 2 MG/ML
4 INJECTION INTRAMUSCULAR; INTRAVENOUS ONCE AS NEEDED
Status: DISCONTINUED | OUTPATIENT
Start: 2021-12-17 | End: 2021-12-17 | Stop reason: HOSPADM

## 2021-12-17 RX ORDER — LIDOCAINE HYDROCHLORIDE 10 MG/ML
0.5 INJECTION, SOLUTION EPIDURAL; INFILTRATION; INTRACAUDAL; PERINEURAL ONCE AS NEEDED
Status: DISCONTINUED | OUTPATIENT
Start: 2021-12-17 | End: 2021-12-17 | Stop reason: HOSPADM

## 2021-12-17 RX ORDER — SODIUM CHLORIDE 0.9 % (FLUSH) 0.9 %
3 SYRINGE (ML) INJECTION EVERY 12 HOURS SCHEDULED
Status: DISCONTINUED | OUTPATIENT
Start: 2021-12-17 | End: 2021-12-17 | Stop reason: HOSPADM

## 2021-12-17 RX ORDER — MIDAZOLAM HYDROCHLORIDE 1 MG/ML
2 INJECTION INTRAMUSCULAR; INTRAVENOUS ONCE
Status: DISCONTINUED | OUTPATIENT
Start: 2021-12-17 | End: 2021-12-17 | Stop reason: HOSPADM

## 2021-12-17 RX ORDER — FENTANYL CITRATE 50 UG/ML
50 INJECTION, SOLUTION INTRAMUSCULAR; INTRAVENOUS ONCE
Status: DISCONTINUED | OUTPATIENT
Start: 2021-12-17 | End: 2021-12-17 | Stop reason: HOSPADM

## 2021-12-17 RX ORDER — HYDROCODONE BITARTRATE AND ACETAMINOPHEN 7.5; 325 MG/1; MG/1
1 TABLET ORAL EVERY 6 HOURS PRN
Qty: 12 TABLET | Refills: 0 | Status: SHIPPED | OUTPATIENT
Start: 2021-12-17 | End: 2022-02-10

## 2021-12-17 RX ORDER — HYDROCODONE BITARTRATE AND ACETAMINOPHEN 5; 325 MG/1; MG/1
1 TABLET ORAL ONCE AS NEEDED
Status: DISCONTINUED | OUTPATIENT
Start: 2021-12-17 | End: 2021-12-17 | Stop reason: HOSPADM

## 2021-12-17 RX ORDER — MIDAZOLAM HYDROCHLORIDE 1 MG/ML
1 INJECTION INTRAMUSCULAR; INTRAVENOUS
Status: DISCONTINUED | OUTPATIENT
Start: 2021-12-17 | End: 2021-12-17 | Stop reason: HOSPADM

## 2021-12-17 RX ORDER — FENTANYL CITRATE 50 UG/ML
100 INJECTION, SOLUTION INTRAMUSCULAR; INTRAVENOUS
Status: DISCONTINUED | OUTPATIENT
Start: 2021-12-17 | End: 2021-12-17 | Stop reason: HOSPADM

## 2021-12-17 RX ORDER — PROPOFOL 10 MG/ML
VIAL (ML) INTRAVENOUS AS NEEDED
Status: DISCONTINUED | OUTPATIENT
Start: 2021-12-17 | End: 2021-12-17 | Stop reason: SURG

## 2021-12-17 RX ORDER — HYDROMORPHONE HYDROCHLORIDE 1 MG/ML
0.5 INJECTION, SOLUTION INTRAMUSCULAR; INTRAVENOUS; SUBCUTANEOUS
Status: DISCONTINUED | OUTPATIENT
Start: 2021-12-17 | End: 2021-12-17 | Stop reason: HOSPADM

## 2021-12-17 RX ORDER — SODIUM CHLORIDE 0.9 % (FLUSH) 0.9 %
3-10 SYRINGE (ML) INJECTION AS NEEDED
Status: DISCONTINUED | OUTPATIENT
Start: 2021-12-17 | End: 2021-12-17 | Stop reason: HOSPADM

## 2021-12-17 RX ORDER — CEFAZOLIN SODIUM 2 G/100ML
2 INJECTION, SOLUTION INTRAVENOUS ONCE
Status: COMPLETED | OUTPATIENT
Start: 2021-12-17 | End: 2021-12-17

## 2021-12-17 RX ORDER — SODIUM CHLORIDE, SODIUM LACTATE, POTASSIUM CHLORIDE, CALCIUM CHLORIDE 600; 310; 30; 20 MG/100ML; MG/100ML; MG/100ML; MG/100ML
9 INJECTION, SOLUTION INTRAVENOUS CONTINUOUS
Status: DISCONTINUED | OUTPATIENT
Start: 2021-12-17 | End: 2021-12-17 | Stop reason: HOSPADM

## 2021-12-17 RX ADMIN — ONDANSETRON 4 MG: 2 INJECTION INTRAMUSCULAR; INTRAVENOUS at 13:11

## 2021-12-17 RX ADMIN — SODIUM CHLORIDE, POTASSIUM CHLORIDE, SODIUM LACTATE AND CALCIUM CHLORIDE 9 ML/HR: 600; 310; 30; 20 INJECTION, SOLUTION INTRAVENOUS at 10:37

## 2021-12-17 RX ADMIN — CEFAZOLIN SODIUM 2 G: 2 INJECTION, SOLUTION INTRAVENOUS at 12:43

## 2021-12-17 RX ADMIN — PROPOFOL 200 MG: 10 INJECTION, EMULSION INTRAVENOUS at 12:37

## 2021-12-17 NOTE — ANESTHESIA PROCEDURE NOTES
Airway  Urgency: elective    Date/Time: 12/17/2021 12:42 PM  Airway not difficult    General Information and Staff    Patient location during procedure: OR  Anesthesiologist: Jeremi Butterfield MD  CRNA: Genet Espino CRNA    Indications and Patient Condition  Indications for airway management: airway protection    Preoxygenated: yes  MILS not maintained throughout  Mask difficulty assessment: 1 - vent by mask    Final Airway Details  Final airway type: supraglottic airway      Successful airway: classic  Size 4    Number of attempts at approach: 1  Assessment: lips, teeth, and gum same as pre-op    Additional Comments  Preoxygenation FEO2 >85, SIVI, LMA placed with ease, teeth/lips as preop. Secured and placement confirmed.

## 2021-12-17 NOTE — OP NOTE
Operative Report     [facility]    Patient: Bonnie Syed  Age:      61 y.o.  :     1960  Sex:      female    Medical Record:  [id number]    Date of Operation/Procedure:  2021    Pre-op Diagnosis:   Right renal calculus    Post-Op Diagnosis Codes:  Same    Pre-operative Diagnosis Free Text:  * No pre-op diagnosis entered *     Name of Operation/Procedure:  Procedure(s) and Anesthesia Type:     * RIGHT EXTRACORPOREAL SHOCKWAVE LITHOTRIPSY AND CYSTOSCOPY - General    Findings/Complications: Solitary stone in the right kidney  Description of procedure: The patient is a 61-year-old lady that had a stone in the right kidney CT scan also showed what appeared to be a stone in the bladder possibly passed from a ureteral calculus that initiated her evaluation the plan was to remove her bladder stone and to do lithotripsy on the stone in the ureter.    [facility] general anesthesia imaging under fluoroscopy revealed her stone in the right kidney we can shockwave lithotripsy and completion of our therapy she had received a total of 3000 shocks maximum power setting of 24 KV.  Throughout the procedure she was monitored under fluoroscopy to make sure we were on the stone and has desired.    In addition because of the question of a stone in her bladder she was prepped and draped in a sterile fashion and the flexible cystoscopic examination was carried out there was no evidence of any type of stones in the bladder there were no intravesical lesions.  There was 1 area in the posterior upper wall of the bladder where the mucosa was a little elevated but I saw no evidence of an intravesical lesion ureteral orifices were normal in configuration location.  At completion of the shockwave lithotripsy she was taken from the operating room in satisfactory condition having tolerated the procedure without complication.  We will ask her to follow-up with Dr. Jeremi Bautista in 2 weeks  with a KUB estimated Blood Loss: none    Specimens: * No orders in the log *    Fluids/Drains: None    Curtis Victor MD  12/17/2021  13:36 EST

## 2021-12-17 NOTE — H&P
FIRST UROLOGY CONSULT      Patient Identification:  NAME:  Bonnie Syed  Age:  61 y.o.   Sex:  female   :  1960   MRN:  [id number]       Chief complaint: Right renal calculus    History of present illness: 61-year-old lady who had presented with hematuria was found to have a 4 mm stone and a 1 mm stone in the right kidney and 2 small stones in the left as well there is a 4 mm bladder stone that is new finding as well.  She is scheduled for surgical treatment      Past medical history:  Past Medical History:   Diagnosis Date   • Allergic     takes 2 allergy injections weekly   • Bladder stone    • Breast cancer (HCC)     left-- had a mastectomy   • Cancer (HCC)    • Chronic UTI    • Fracture, femur (HCC) 2018   • GERD (gastroesophageal reflux disease)    • History of migraine    • Hypertension    • Osteopenia    • Reflux gastritis    • Renal stone     RIGHT DEC 2021   • Rheumatoid arthritis (HCC)    • Seborrheic dermatitis     SCLAP AREA-DRY ITCHY SKIN   • Unsteady gait     LEFT HIP   • Weakness     LEFT HIP       Past surgical history:  Past Surgical History:   Procedure Laterality Date   • BREAST SURGERY      MALIGNANT   • BUNIONECTOMY Left    • COLONOSCOPY     • DILATATION AND CURETTAGE     • FEMUR IM NAILING/RODDING Left 2018    Procedure: FEMUR INTRAMEDULLARY NAILING/RODDING;  Surgeon: Samy Altamirano II, MD;  Location: [facility];  Service: Orthopedics   • HYSTERECTOMY     • MASTECTOMY Left     MALIGNANT   • TOTAL HIP ARTHROPLASTY Left 2020    Procedure: left TOTAL HIP arthroplasty, orif femur and removal of hardware;  Surgeon: Samy Altamirano II, MD;  Location: [facility];  Service: Orthopedics   • TRAM FLAP REVISION NIPPLE RECONSTRUCTION BREAST REDUCTION Left        Allergies:  Monistat [miconazole], Sulfa antibiotics, and Macrobid [nitrofurantoin]    Home medications:  Medications Prior to Admission   Medication Sig Dispense Refill Last Dose   •  betamethasone dipropionate (DIPROLENE) 0.05 % lotion Apply 1 application topically to the appropriate area as directed Daily. ON TOP OF HEAD      • Biotin 5000 MCG capsule Take 10,000 mcg by mouth Daily.      • busPIRone (BUSPAR) 10 MG tablet Take 10 mg by mouth As Needed.      • calcium carbonate-vitamin d (CALTRATE 600+D) 600-400 MG-UNIT per tablet One tab po BID for osteopenia (Patient taking differently: Take 3 tablets by mouth 2 (Two) Times a Day. One tab po BID for osteopenia  2 TABS IN AM  1 TAB IN PM)      • clobetasol (TEMOVATE) 0.05 % ointment Apply 1 application topically to the appropriate area as directed As Needed.      • inFLIXimab (REMICADE IV) Infuse  into a venous catheter. Once a month for arthritis      • leflunomide (ARAVA) 20 MG tablet Take 10 mg by mouth Daily.      • metoprolol succinate XL (TOPROL-XL) 100 MG 24 hr tablet Take 1 tablet by mouth Daily. 90 tablet 3    • montelukast (SINGULAIR) 10 MG tablet Take 10 mg by mouth Daily.      • Multiple Vitamin (MULTIVITAMIN) capsule Take 1 capsule by mouth Daily. HOLD PRIOR TO SURGERY      • pantoprazole (PROTONIX) 20 MG EC tablet Take 1 tablet by mouth Daily. (Patient taking differently: Take 20 mg by mouth 1 (One) Time As Needed.)      • phenazopyridine (Pyridium) 200 MG tablet Take 1 tablet by mouth 3 (Three) Times a Day As Needed for Bladder Spasms. 30 tablet 0         Hospital medications:  ceFAZolin, 2 g, Intravenous, Once             Family history:  Family History   Problem Relation Age of Onset   • Hypertension Mother    • Hypertension Father    • Heart attack Maternal Grandfather    • Breast cancer Neg Hx    • Colon cancer Neg Hx    • Malig Hyperthermia Neg Hx        Social history:  Social History     Tobacco Use   • Smoking status: Never Smoker   • Smokeless tobacco: Never Used   Vaping Use   • Vaping Use: Never used   Substance Use Topics   • Alcohol use: No   • Drug use: Never       Review of systems:      Positive for: History of  breast cancer and treatment negative for:Objective:  TMax 24 hours:   No data recorded.      Vitals Ranges:        Intake/Output Last 3 shifts:  No intake/output data recorded.     Physical Exam:    General Appearance:    Alert, cooperative, NAD   HEENT:    No trauma, pupils reactive, hearing intact   Back:     No CVA tenderness   Lungs:     Respirations unlabored, no wheezing    Heart:    RRR, intact peripheral pulses   Abdomen:     Soft, NDNT, no masses, no guarding   :    Pelvic not performed, bladder nondistended and nontender   Extremities:   No edema, no deformity   Lymphatic:   No neck or groin LAD   Skin:   No bleeding, bruising or rashes   Neuro/Psych:   Orientation intact, mood/affect pleasant, no focal findings       Results review:   I reviewed the patient's new clinical results.    Data review:  Lab Results (last 24 hours)     ** No results found for the last 24 hours. **           Imaging:  Imaging Results (Last 24 Hours)     ** No results found for the last 24 hours. **             Assessment:       * No active hospital problems. *    Renal calculus  Bladder calculus    Plan:     Right extracorporeal shockwave lithotripsy  Cystoscopy with bladder stone removal    Curtis Victor MD  12/17/21  10:07 EST

## 2021-12-17 NOTE — ANESTHESIA POSTPROCEDURE EVALUATION
Patient: Bonnie Syed    Procedure Summary     Date: 12/17/21 Room / Location:  [facility]    Anesthesia Start: 1233 Anesthesia Stop: 1319    Procedure: RIGHT EXTRACORPOREAL SHOCKWAVE LITHOTRIPSY AND CYSTOSCOPY (Right ) Diagnosis:     Surgeons: Curtis Victor MD Provider: Tha, Agusto Allen MD    Anesthesia Type: general ASA Status: 3          Anesthesia Type: general    Vitals  Vitals Value Taken Time   /91 12/17/21 1401   Temp 36.3 °C (97.3 °F) 12/17/21 1400   Pulse 85 12/17/21 1405   Resp 16 12/17/21 1400   SpO2 98 % 12/17/21 1405   Vitals shown include unvalidated device data.        Post Anesthesia Care and Evaluation    Patient location during evaluation: bedside  Patient participation: complete - patient participated  Level of consciousness: awake and alert  Pain management: adequate  Airway patency: patent  Anesthetic complications: No anesthetic complications    Cardiovascular status: acceptable  Respiratory status: acceptable  Hydration status: acceptable    Comments: /91   Pulse 89   Temp 36.3 °C (97.3 °F) (Temporal)   Resp 16   SpO2 97%

## 2021-12-17 NOTE — ANESTHESIA PREPROCEDURE EVALUATION
Anesthesia Evaluation     Patient summary reviewed and Nursing notes reviewed   NPO Solid Status: > 8 hours             Airway   Mallampati: I  TM distance: >3 FB  Neck ROM: full  No difficulty expected  Dental - normal exam     Pulmonary - negative pulmonary ROS and normal exam   Cardiovascular - normal exam    (+) hypertension,       Neuro/Psych- negative ROS  GI/Hepatic/Renal/Endo - negative ROS     Musculoskeletal     Abdominal  - normal exam    Bowel sounds: normal.   Substance History - negative use     OB/GYN negative ob/gyn ROS         Other   arthritis,    history of cancer      Other Comment: Rheumatoid arthritis                    Anesthesia Plan    ASA 3     general   (S/p orif left femur- pt wants everyone to be careful with her left leg due to continued pain)    Anesthetic plan, all risks, benefits, and alternatives have been provided, discussed and informed consent has been obtained with: patient.

## 2022-01-03 DIAGNOSIS — K21.00 GASTROESOPHAGEAL REFLUX DISEASE WITH ESOPHAGITIS, UNSPECIFIED WHETHER HEMORRHAGE: ICD-10-CM

## 2022-01-03 RX ORDER — PANTOPRAZOLE SODIUM 20 MG/1
20 TABLET, DELAYED RELEASE ORAL DAILY
Qty: 90 TABLET | Refills: 1 | Status: SHIPPED | OUTPATIENT
Start: 2022-01-03 | End: 2022-11-10

## 2022-01-03 RX ORDER — METOPROLOL SUCCINATE 100 MG/1
100 TABLET, EXTENDED RELEASE ORAL DAILY
Qty: 90 TABLET | Refills: 3 | Status: SHIPPED | OUTPATIENT
Start: 2022-01-03 | End: 2023-01-01 | Stop reason: SDUPTHER

## 2022-01-03 RX ORDER — PANTOPRAZOLE SODIUM 20 MG/1
20 TABLET, DELAYED RELEASE ORAL DAILY
Qty: 90 TABLET | Refills: 1
Start: 2022-01-03 | End: 2022-01-03

## 2022-02-10 ENCOUNTER — OFFICE VISIT (OUTPATIENT)
Dept: FAMILY MEDICINE CLINIC | Facility: CLINIC | Age: 62
End: 2022-02-10

## 2022-02-10 VITALS
HEART RATE: 85 BPM | DIASTOLIC BLOOD PRESSURE: 72 MMHG | OXYGEN SATURATION: 99 % | BODY MASS INDEX: 23.56 KG/M2 | TEMPERATURE: 97.8 F | HEIGHT: 64 IN | WEIGHT: 138 LBS | SYSTOLIC BLOOD PRESSURE: 110 MMHG

## 2022-02-10 DIAGNOSIS — E78.00 HIGH CHOLESTEROL: ICD-10-CM

## 2022-02-10 DIAGNOSIS — Z00.00 WELLNESS EXAMINATION: Primary | ICD-10-CM

## 2022-02-10 DIAGNOSIS — I10 ESSENTIAL HYPERTENSION: ICD-10-CM

## 2022-02-10 PROCEDURE — 99396 PREV VISIT EST AGE 40-64: CPT | Performed by: FAMILY MEDICINE

## 2022-02-10 NOTE — PROGRESS NOTES
"Chief Complaint  Annual Exam (annual Wellness)    Subjective          Bonnie Syed presents to [facility]  History of Present Illness  Not fasting, BP under control, has refills,, non smoker, no ETOH, no drugs , last MY last week normal, had total hysterectomy, had Breast cancer, [facility], last Colonoscopy almost,  10 years ago , no colon cancer in family, last DXA at Rheumatologist, + eye care, patient is doing remarkably well, very proud of her, no chest pain no shortness of breath, she is so well organized and she is on top of everything that is needed to keep her healthy  Objective   Vital Signs:   /72   Pulse 85   Temp 97.8 °F (36.6 °C) (Infrared)   Ht 162.6 cm (64\")   Wt 62.6 kg (138 lb)   SpO2 99%   BMI 23.69 kg/m²     Physical Exam  Vitals and nursing note reviewed.   Constitutional:       Appearance: She is well-developed.   HENT:      Head: Normocephalic and atraumatic.      Right Ear: Tympanic membrane, ear canal and external ear normal.      Left Ear: Tympanic membrane, ear canal and external ear normal.      Nose: Nose normal.   Eyes:      General: No scleral icterus.        Right eye: No discharge.         Left eye: No discharge.      Conjunctiva/sclera: Conjunctivae normal.      Pupils: Pupils are equal, round, and reactive to light.   Neck:      Thyroid: No thyromegaly.      Vascular: No JVD.      Trachea: No tracheal deviation.   Cardiovascular:      Rate and Rhythm: Normal rate and regular rhythm.      Heart sounds: Normal heart sounds. No murmur heard.  No friction rub. No gallop.    Pulmonary:      Effort: Pulmonary effort is normal. No respiratory distress.      Breath sounds: Normal breath sounds. No wheezing or rales.   Chest:      Chest wall: No tenderness.   Abdominal:      General: Bowel sounds are normal. There is no distension.      Palpations: Abdomen is soft. There is no mass.      Tenderness: There is no abdominal tenderness. There is " no guarding or rebound.      Hernia: No hernia is present.   Musculoskeletal:         General: No tenderness. Normal range of motion.      Cervical back: Normal range of motion and neck supple.      Comments: Uses a cane   Lymphadenopathy:      Cervical: No cervical adenopathy.   Skin:     General: Skin is warm and dry.   Neurological:      General: No focal deficit present.      Mental Status: She is alert.      Cranial Nerves: No cranial nerve deficit.      Sensory: No sensory deficit.      Motor: No abnormal muscle tone.      Coordination: Coordination normal.      Deep Tendon Reflexes: Reflexes normal.   Psychiatric:         Mood and Affect: Mood normal.         Behavior: Behavior normal.         Thought Content: Thought content normal.         Judgment: Judgment normal.        Result Review :                 Assessment and Plan    Diagnoses and all orders for this visit:    1. Wellness examination (Primary)    2. High cholesterol  -     Comprehensive Metabolic Panel; Future  -     Lipid Panel; Future    3. Essential hypertension  -     Comprehensive Metabolic Panel; Future  -     Lipid Panel; Future        Follow Up   Return in about 1 year (around 2/10/2023).  Patient was given instructions and counseling regarding her condition or for health maintenance advice. Please see specific information pulled into the AVS if appropriate.       Answers for HPI/ROS submitted by the patient on 2/3/2022  Please describe your symptoms.: No symptoms, check up visit/yearly  Have you had these symptoms before?: No  How long have you been having these symptoms?: 1-4 days  Please list any medications you are currently taking for this condition.: No symptoms, no medicine  Please describe any probable cause for these symptoms. : N/A  What is the primary reason for your visit?: Other

## 2022-03-03 ENCOUNTER — TELEPHONE (OUTPATIENT)
Dept: FAMILY MEDICINE CLINIC | Facility: CLINIC | Age: 62
End: 2022-03-03

## 2022-03-03 NOTE — TELEPHONE ENCOUNTER
Caller: Bonnie Syed    Relationship: Self    Best call back number: [phone] - PLEASE  CALL PATIENT ONCE REFERRAL IS PLACED     What is the medical concern/diagnosis: N/A     What specialty or service is being requested: COLONOSCOPY   What is the provider, practice or medical service name: N/A     What is the office location: N/A     What is the office phone number: N/A     Any additional details: PATIENT WOULD LIKE TO GO IN THE SUMMER AND WOULD LIKE TO BE REFERRED OUT .

## 2022-03-04 DIAGNOSIS — Z12.11 ENCOUNTER FOR SCREENING COLONOSCOPY: Primary | ICD-10-CM

## 2022-03-05 DIAGNOSIS — I10 ESSENTIAL HYPERTENSION: Primary | ICD-10-CM

## 2022-03-07 ENCOUNTER — TELEPHONE (OUTPATIENT)
Dept: FAMILY MEDICINE CLINIC | Facility: CLINIC | Age: 62
End: 2022-03-07

## 2022-03-07 NOTE — TELEPHONE ENCOUNTER
PATIENT WANTED TO KNOW HOW MUCH CAN SHE INCREASE HER FISH OIL/ IF THAT'S OK WITH YOU    PLEASE ADVISE    Bonnie Syed (Self) [phone] (H)

## 2022-03-10 ENCOUNTER — PRE-PROCEDURE SCREENING (OUTPATIENT)
Dept: GASTROENTEROLOGY | Facility: CLINIC | Age: 62
End: 2022-03-10

## 2022-03-10 DIAGNOSIS — I10 ESSENTIAL HYPERTENSION: ICD-10-CM

## 2022-03-10 NOTE — TELEPHONE ENCOUNTER
Last scope 10yrs ( no records)--No personal history of polyps-- No family history of polyps or colon cancer--No blood thinners--Medications:         Biotin 5000 MCG capsule  busPIRone (BUSPAR) 10 MG tablet  calcium carbonate-vitamin d (CALTRATE 600+D) 600-400 MG-UNIT per tablet  inFLIXimab (REMICADE IV)  metoprolol succinate XL (TOPROL-XL) 100 MG 24 hr tablet  montelukast (SINGULAIR) 10 MG tablet  Multiple Vitamin (MULTIVITAMIN) capsule  pantoprazole (PROTONIX) 20 MG EC tablet                   Pt verbalized and understood that it would be few weeks before been schedule

## 2022-03-11 LAB
CALCIUM SERPL-MCNC: 10.6 MG/DL (ref 8.7–10.3)
INTACT PTH: ABNORMAL
PTH-INTACT SERPL-MCNC: 58 PG/ML (ref 15–65)

## 2022-03-15 ENCOUNTER — PREP FOR SURGERY (OUTPATIENT)
Dept: OTHER | Facility: HOSPITAL | Age: 62
End: 2022-03-15

## 2022-03-15 DIAGNOSIS — Z12.11 ENCOUNTER FOR SCREENING FOR MALIGNANT NEOPLASM OF COLON: Primary | ICD-10-CM

## 2022-03-15 RX ORDER — SODIUM CHLORIDE, SODIUM LACTATE, POTASSIUM CHLORIDE, CALCIUM CHLORIDE 600; 310; 30; 20 MG/100ML; MG/100ML; MG/100ML; MG/100ML
30 INJECTION, SOLUTION INTRAVENOUS CONTINUOUS
Status: CANCELLED | OUTPATIENT
Start: 2022-08-22

## 2022-03-15 NOTE — PROGRESS NOTES
Your blood tests indicate that your calcium level remains elevated but your parathyroid hormone level is normal.  At this time we would recommend holding your calcium supplements and following up to recheck your calcium levels in 1 to 2 months with a repeat parathyroid hormone and vitamin D level.

## 2022-03-16 ENCOUNTER — TELEPHONE (OUTPATIENT)
Dept: FAMILY MEDICINE CLINIC | Facility: CLINIC | Age: 62
End: 2022-03-16

## 2022-03-16 NOTE — TELEPHONE ENCOUNTER
Patient called to schedule lab appointments    Patient has lab apts on    04/27/2022 for Calcium/ parathyroid hormone and vitamin D level per notes      06/01/2022 for 3 month cholesterol recheck    Please enter lab orders    Patient can be contacted at [phone]

## 2022-03-19 DIAGNOSIS — E83.52 HYPERCALCEMIA: Primary | ICD-10-CM

## 2022-03-22 ENCOUNTER — TELEPHONE (OUTPATIENT)
Dept: FAMILY MEDICINE CLINIC | Facility: CLINIC | Age: 62
End: 2022-03-22

## 2022-03-22 NOTE — TELEPHONE ENCOUNTER
Pt was checking on the process of her colonoscopy appt.  I looked in referrals and she already spoke to someone regarding the questionnaire.  Can someone please check  On the status for her

## 2022-04-05 ENCOUNTER — TELEPHONE (OUTPATIENT)
Dept: GASTROENTEROLOGY | Facility: CLINIC | Age: 62
End: 2022-04-05

## 2022-04-05 NOTE — TELEPHONE ENCOUNTER
Patient called to schedule procedure with Dr. Reyes in late summer. Prefer a Friday if possible. Please call patient to schedule procedure.

## 2022-04-05 NOTE — TELEPHONE ENCOUNTER
spoke with pt scheduled at [facility] on july 21 arrive at 0730 am [facility]----joey instructions colin

## 2022-04-29 ENCOUNTER — TELEPHONE (OUTPATIENT)
Dept: GASTROENTEROLOGY | Facility: CLINIC | Age: 62
End: 2022-04-29

## 2022-05-09 ENCOUNTER — TELEPHONE (OUTPATIENT)
Dept: GASTROENTEROLOGY | Facility: CLINIC | Age: 62
End: 2022-05-09

## 2022-06-01 DIAGNOSIS — E83.52 HYPERCALCEMIA: Primary | ICD-10-CM

## 2022-06-01 DIAGNOSIS — E78.00 HIGH CHOLESTEROL: ICD-10-CM

## 2022-06-01 DIAGNOSIS — I10 ESSENTIAL HYPERTENSION: ICD-10-CM

## 2022-06-02 DIAGNOSIS — E83.52 HYPERCALCEMIA: Primary | ICD-10-CM

## 2022-06-02 DIAGNOSIS — E78.00 HIGH CHOLESTEROL: ICD-10-CM

## 2022-06-02 LAB
ALBUMIN SERPL-MCNC: 5.8 G/DL (ref 3.8–4.8)
ALBUMIN/GLOB SERPL: 2.1 {RATIO} (ref 1.2–2.2)
ALP SERPL-CCNC: 108 IU/L (ref 44–121)
ALT SERPL-CCNC: 23 IU/L (ref 0–32)
AST SERPL-CCNC: 33 IU/L (ref 0–40)
BASOPHILS # BLD AUTO: 0.1 X10E3/UL (ref 0–0.2)
BASOPHILS NFR BLD AUTO: 1 %
BILIRUB SERPL-MCNC: 0.5 MG/DL (ref 0–1.2)
BUN SERPL-MCNC: 18 MG/DL (ref 8–27)
BUN/CREAT SERPL: 20 (ref 12–28)
CALCIUM SERPL-MCNC: 11.2 MG/DL (ref 8.7–10.3)
CHLORIDE SERPL-SCNC: 100 MMOL/L (ref 96–106)
CHOLEST SERPL-MCNC: 261 MG/DL (ref 100–199)
CO2 SERPL-SCNC: 23 MMOL/L (ref 20–29)
CREAT SERPL-MCNC: 0.88 MG/DL (ref 0.57–1)
EGFRCR SERPLBLD CKD-EPI 2021: 75 ML/MIN/1.73
EOSINOPHIL # BLD AUTO: 0.3 X10E3/UL (ref 0–0.4)
EOSINOPHIL NFR BLD AUTO: 4 %
ERYTHROCYTE [DISTWIDTH] IN BLOOD BY AUTOMATED COUNT: 13.7 % (ref 11.7–15.4)
GLOBULIN SER CALC-MCNC: 2.7 G/DL (ref 1.5–4.5)
GLUCOSE SERPL-MCNC: 94 MG/DL (ref 65–99)
HCT VFR BLD AUTO: 52.2 % (ref 34–46.6)
HDLC SERPL-MCNC: 60 MG/DL
HGB BLD-MCNC: 16.9 G/DL (ref 11.1–15.9)
IMM GRANULOCYTES # BLD AUTO: 0 X10E3/UL (ref 0–0.1)
IMM GRANULOCYTES NFR BLD AUTO: 0 %
LDLC SERPL CALC-MCNC: 160 MG/DL (ref 0–99)
LYMPHOCYTES # BLD AUTO: 3.1 X10E3/UL (ref 0.7–3.1)
LYMPHOCYTES NFR BLD AUTO: 35 %
MCH RBC QN AUTO: 27.6 PG (ref 26.6–33)
MCHC RBC AUTO-ENTMCNC: 32.4 G/DL (ref 31.5–35.7)
MCV RBC AUTO: 85 FL (ref 79–97)
MONOCYTES # BLD AUTO: 0.6 X10E3/UL (ref 0.1–0.9)
MONOCYTES NFR BLD AUTO: 7 %
NEUTROPHILS # BLD AUTO: 4.9 X10E3/UL (ref 1.4–7)
NEUTROPHILS NFR BLD AUTO: 53 %
PLATELET # BLD AUTO: 306 X10E3/UL (ref 150–450)
POTASSIUM SERPL-SCNC: 5.2 MMOL/L (ref 3.5–5.2)
PROT SERPL-MCNC: 8.5 G/DL (ref 6–8.5)
RBC # BLD AUTO: 6.12 X10E6/UL (ref 3.77–5.28)
SODIUM SERPL-SCNC: 140 MMOL/L (ref 134–144)
TRIGL SERPL-MCNC: 224 MG/DL (ref 0–149)
VLDLC SERPL CALC-MCNC: 41 MG/DL (ref 5–40)
WBC # BLD AUTO: 9.1 X10E3/UL (ref 3.4–10.8)

## 2022-06-02 RX ORDER — ATORVASTATIN CALCIUM 20 MG/1
20 TABLET, FILM COATED ORAL NIGHTLY
Qty: 90 TABLET | Refills: 1 | Status: SHIPPED | OUTPATIENT
Start: 2022-06-02 | End: 2022-06-13

## 2022-06-13 DIAGNOSIS — R42 DIZZINESS: Primary | ICD-10-CM

## 2022-06-13 DIAGNOSIS — E78.00 HIGH CHOLESTEROL: Primary | ICD-10-CM

## 2022-06-13 RX ORDER — ROSUVASTATIN CALCIUM 10 MG/1
10 TABLET, COATED ORAL NIGHTLY
Qty: 90 TABLET | Refills: 3 | Status: SHIPPED | OUTPATIENT
Start: 2022-06-13

## 2022-06-13 RX ORDER — MECLIZINE HYDROCHLORIDE 25 MG/1
25 TABLET ORAL 3 TIMES DAILY PRN
Qty: 90 TABLET | Refills: 0 | Status: SHIPPED | OUTPATIENT
Start: 2022-06-13 | End: 2022-06-23

## 2022-07-18 ENCOUNTER — HOSPITAL ENCOUNTER (OUTPATIENT)
Facility: HOSPITAL | Age: 62
Setting detail: HOSPITAL OUTPATIENT SURGERY
Discharge: HOME | End: 2022-07-18
Attending: INTERNAL MEDICINE | Admitting: INTERNAL MEDICINE

## 2022-07-18 ENCOUNTER — ANESTHESIA (OUTPATIENT)
Dept: GASTROENTEROLOGY | Facility: HOSPITAL | Age: 62
End: 2022-07-18

## 2022-07-18 ENCOUNTER — ANESTHESIA EVENT (OUTPATIENT)
Dept: GASTROENTEROLOGY | Facility: HOSPITAL | Age: 62
End: 2022-07-18

## 2022-07-18 VITALS
OXYGEN SATURATION: 98 % | BODY MASS INDEX: 22.9 KG/M2 | HEIGHT: 64 IN | WEIGHT: 134.1 LBS | RESPIRATION RATE: 16 BRPM | HEART RATE: 78 BPM | DIASTOLIC BLOOD PRESSURE: 82 MMHG | SYSTOLIC BLOOD PRESSURE: 114 MMHG

## 2022-07-18 DIAGNOSIS — Z12.11 ENCOUNTER FOR SCREENING FOR MALIGNANT NEOPLASM OF COLON: ICD-10-CM

## 2022-07-18 PROCEDURE — 45385 COLONOSCOPY W/LESION REMOVAL: CPT | Performed by: INTERNAL MEDICINE

## 2022-07-18 PROCEDURE — 25010000002 PROPOFOL 10 MG/ML EMULSION: Performed by: ANESTHESIOLOGY

## 2022-07-18 PROCEDURE — 88305 TISSUE EXAM BY PATHOLOGIST: CPT | Performed by: INTERNAL MEDICINE

## 2022-07-18 RX ORDER — LIDOCAINE HYDROCHLORIDE 20 MG/ML
INJECTION, SOLUTION INFILTRATION; PERINEURAL AS NEEDED
Status: DISCONTINUED | OUTPATIENT
Start: 2022-07-18 | End: 2022-07-18 | Stop reason: SURG

## 2022-07-18 RX ORDER — SODIUM CHLORIDE 0.9 % (FLUSH) 0.9 %
10 SYRINGE (ML) INJECTION EVERY 12 HOURS SCHEDULED
Status: DISCONTINUED | OUTPATIENT
Start: 2022-07-18 | End: 2022-07-18 | Stop reason: HOSPADM

## 2022-07-18 RX ORDER — PROPOFOL 10 MG/ML
VIAL (ML) INTRAVENOUS CONTINUOUS PRN
Status: DISCONTINUED | OUTPATIENT
Start: 2022-07-18 | End: 2022-07-18 | Stop reason: SURG

## 2022-07-18 RX ORDER — SODIUM CHLORIDE, SODIUM LACTATE, POTASSIUM CHLORIDE, CALCIUM CHLORIDE 600; 310; 30; 20 MG/100ML; MG/100ML; MG/100ML; MG/100ML
30 INJECTION, SOLUTION INTRAVENOUS CONTINUOUS
Status: DISCONTINUED | OUTPATIENT
Start: 2022-08-22 | End: 2022-07-18 | Stop reason: HOSPADM

## 2022-07-18 RX ORDER — SODIUM CHLORIDE, SODIUM LACTATE, POTASSIUM CHLORIDE, CALCIUM CHLORIDE 600; 310; 30; 20 MG/100ML; MG/100ML; MG/100ML; MG/100ML
30 INJECTION, SOLUTION INTRAVENOUS CONTINUOUS PRN
Status: DISCONTINUED | OUTPATIENT
Start: 2022-07-18 | End: 2022-07-18 | Stop reason: HOSPADM

## 2022-07-18 RX ORDER — SODIUM CHLORIDE 0.9 % (FLUSH) 0.9 %
10 SYRINGE (ML) INJECTION AS NEEDED
Status: DISCONTINUED | OUTPATIENT
Start: 2022-07-18 | End: 2022-07-18 | Stop reason: HOSPADM

## 2022-07-18 RX ADMIN — SODIUM CHLORIDE, POTASSIUM CHLORIDE, SODIUM LACTATE AND CALCIUM CHLORIDE 30 ML/HR: 600; 310; 30; 20 INJECTION, SOLUTION INTRAVENOUS at 07:45

## 2022-07-18 RX ADMIN — PROPOFOL 200 MCG/KG/MIN: 10 INJECTION, EMULSION INTRAVENOUS at 08:03

## 2022-07-18 RX ADMIN — LIDOCAINE HYDROCHLORIDE 60 MG: 20 INJECTION, SOLUTION INFILTRATION; PERINEURAL at 08:03

## 2022-07-18 NOTE — H&P
[facility]  Pre Procedure History & Physical    Chief Complaint:   Time for my colonoscopy    Subjective     HPI:   61 y.o. female presenting to endoscopy unit today for screening colonoscopy.    Past Medical History:   Past Medical History:   Diagnosis Date   • Allergic     takes 2 allergy injections weekly   • Bladder stone    • Breast cancer (HCC)     left-- had a mastectomy   • Cancer (HCC)    • Chronic UTI    • Fracture, femur (HCC) 11/2018   • GERD (gastroesophageal reflux disease)    • History of migraine    • Hypertension    • Osteopenia    • Reflux gastritis    • Renal stone     RIGHT DEC 2021   • Rheumatoid arthritis (HCC)    • Seborrheic dermatitis     SCLAP AREA-DRY ITCHY SKIN   • Unsteady gait     LEFT HIP   • Weakness     LEFT HIP       Family History:  Family History   Problem Relation Age of Onset   • Hypertension Mother    • Hypertension Father    • Heart attack Maternal Grandfather    • Breast cancer Neg Hx    • Colon cancer Neg Hx    • Malig Hyperthermia Neg Hx        Social History:   reports that she has never smoked. She has never used smokeless tobacco. She reports that she does not drink alcohol and does not use drugs.    Medications:   Medications Prior to Admission   Medication Sig Dispense Refill Last Dose   • Biotin 5000 MCG capsule Take 10,000 mcg by mouth Daily.      • busPIRone (BUSPAR) 10 MG tablet Take 10 mg by mouth As Needed.      • calcium carbonate-vitamin d (CALTRATE 600+D) 600-400 MG-UNIT per tablet One tab po BID for osteopenia (Patient taking differently: Take 3 tablets by mouth 2 (Two) Times a Day. One tab po BID for osteopenia  2 TABS IN AM  1 TAB IN PM)      • inFLIXimab (REMICADE IV) Infuse  into a venous catheter. Once a month for arthritis      • metoprolol succinate XL (TOPROL-XL) 100 MG 24 hr tablet Take 1 tablet by mouth Daily. 90 tablet 3    • montelukast (SINGULAIR) 10 MG tablet Take 10 mg by mouth Daily.      • Multiple Vitamin  (MULTIVITAMIN) capsule Take 1 capsule by mouth Daily. HOLD PRIOR TO SURGERY      • pantoprazole (PROTONIX) 20 MG EC tablet Take 1 tablet by mouth Daily. 90 tablet 1    • rosuvastatin (Crestor) 10 MG tablet Take 1 tablet by mouth Every Night. 90 tablet 3        Allergies:  Monistat [miconazole], Sulfa antibiotics, and Macrobid [nitrofurantoin]    ROS:    Pertinent items are noted in HPI     Objective     not currently breastfeeding.    Physical Exam   Constitutional: Pt is oriented to person, place, and time and well-developed, well-nourished, and in no distress.   Abdominal: Soft.   Psychiatric: Mood, memory, affect and judgment normal.     Assessment & Plan     Diagnosis:  Encounter for screening for colon cancer    Anticipated Surgical Procedure:  Colonoscopy    The risks, benefits, and alternatives of this procedure have been discussed with the patient or the responsible party- the patient understands and agrees to proceed.

## 2022-07-18 NOTE — DISCHARGE INSTRUCTIONS
For the next 24 hours patient needs to be with a responsible adult.    For 24 hours DO NOT drive, operate machinery, appliances, drink alcohol, make important decisions or sign legal documents.    Start with a light or bland diet if you are feeling sick to your stomach otherwise advance to regular diet as tolerated.    Follow recommendations on procedure report if provided by your doctor.    Call Dr Reyes for problems [phone]    Problems may include but not limited to: large amounts of bleeding, trouble breathing, repeated vomiting, severe unrelieved pain, fever or chills.

## 2022-07-18 NOTE — ANESTHESIA PREPROCEDURE EVALUATION
Anesthesia Evaluation     Patient summary reviewed and Nursing notes reviewed                Airway   Mallampati: I  TM distance: >3 FB  Neck ROM: full  No difficulty expected  Dental - normal exam     Pulmonary - negative pulmonary ROS and normal exam   Cardiovascular - normal exam    (+) hypertension, hyperlipidemia,       Neuro/Psych  (+) weakness,    GI/Hepatic/Renal/Endo    (+)  GERD,  renal disease stones,     Musculoskeletal     Abdominal  - normal exam    Bowel sounds: normal.   Substance History - negative use     OB/GYN negative ob/gyn ROS         Other   arthritis,    history of cancer                    Anesthesia Plan    ASA 3     MAC       Anesthetic plan, risks, benefits, and alternatives have been provided, discussed and informed consent has been obtained with: patient.        CODE STATUS:

## 2022-07-18 NOTE — ANESTHESIA POSTPROCEDURE EVALUATION
"Patient: Bonnie Syed    Procedure Summary     Date: 07/18/22 Room / Location: [facility] END[facility]COPY 10 /  [facility]    Anesthesia Start: 0802 Anesthesia Stop: 0821    Procedure: COLONOSCOPY TO CECUM WITH COLD SNARE POLYPECTOMY (N/A ) Diagnosis:       Encounter for screening for malignant neoplasm of colon      (Encounter for screening for malignant neoplasm of colon [Z12.11])    Surgeons: Jamie Reyes MD Provider: Larry Dave MD    Anesthesia Type: MAC ASA Status: 3          Anesthesia Type: MAC    Vitals  Vitals Value Taken Time   /82 07/18/22 0844   Temp     Pulse 78 07/18/22 0844   Resp 16 07/18/22 0844   SpO2 98 % 07/18/22 0844           Post Anesthesia Care and Evaluation    Patient location during evaluation: PACU  Patient participation: complete - patient participated  Level of consciousness: awake  Pain score: 0  Pain management: adequate    Airway patency: patent  Anesthetic complications: No anesthetic complications  PONV Status: none  Cardiovascular status: acceptable  Respiratory status: acceptable  Hydration status: acceptable    Comments: /82 (BP Location: Right arm, Patient Position: Sitting)   Pulse 78   Resp 16   Ht 162.6 cm (64\")   Wt 60.8 kg (134 lb 1.6 oz)   SpO2 98%   BMI 23.02 kg/m²       "

## 2022-07-19 LAB
LAB AP CASE REPORT: NORMAL
PATH REPORT.FINAL DX SPEC: NORMAL
PATH REPORT.GROSS SPEC: NORMAL

## 2022-07-22 ENCOUNTER — TELEPHONE (OUTPATIENT)
Dept: GASTROENTEROLOGY | Facility: CLINIC | Age: 62
End: 2022-07-22

## 2022-07-22 NOTE — TELEPHONE ENCOUNTER
Called pt and advised of Dr Reyes's note.  Pt verbalized understanding.     Colonoscopy placed in recall for 7/18/2027.

## 2022-07-22 NOTE — TELEPHONE ENCOUNTER
----- Message from Jamie Reyes MD sent at 7/19/2022 11:09 AM EDT -----  The polyp(s) biopsies showed adenomatous change. This is not cancerous but is considered potentially precancerous. Follow-up colonoscopy in 5 years is advised.

## 2022-08-29 ENCOUNTER — TELEPHONE (OUTPATIENT)
Dept: FAMILY MEDICINE CLINIC | Facility: CLINIC | Age: 62
End: 2022-08-29

## 2022-08-29 DIAGNOSIS — I10 ESSENTIAL HYPERTENSION: ICD-10-CM

## 2022-08-29 DIAGNOSIS — E83.52 HYPERCALCEMIA: Primary | ICD-10-CM

## 2022-08-29 DIAGNOSIS — E78.00 HIGH CHOLESTEROL: ICD-10-CM

## 2022-08-29 NOTE — TELEPHONE ENCOUNTER
Patient asked about doing labs before the appointment on 9/7/2022 but I didn't see any orders for it.

## 2022-09-07 ENCOUNTER — OFFICE VISIT (OUTPATIENT)
Dept: FAMILY MEDICINE CLINIC | Facility: CLINIC | Age: 62
End: 2022-09-07

## 2022-09-07 VITALS
HEART RATE: 70 BPM | BODY MASS INDEX: 22.53 KG/M2 | DIASTOLIC BLOOD PRESSURE: 68 MMHG | SYSTOLIC BLOOD PRESSURE: 118 MMHG | TEMPERATURE: 98 F | HEIGHT: 64 IN | OXYGEN SATURATION: 100 % | WEIGHT: 132 LBS

## 2022-09-07 DIAGNOSIS — I10 ESSENTIAL HYPERTENSION: ICD-10-CM

## 2022-09-07 DIAGNOSIS — E83.52 HYPERCALCEMIA: ICD-10-CM

## 2022-09-07 DIAGNOSIS — E78.00 HIGH CHOLESTEROL: Primary | ICD-10-CM

## 2022-09-07 PROCEDURE — 99214 OFFICE O/P EST MOD 30 MIN: CPT | Performed by: FAMILY MEDICINE

## 2022-09-07 NOTE — PROGRESS NOTES
"Chief Complaint  Hyperlipidemia    Subjective        Bonnie H Kunal presents to [facility]  History of Present Illness  DXA to be done by Rheumatologist patient has rheumatoid arthritis, was taking 3 tablets calcium with vitamin D, patient is fasting, working on diet and exercise as well, no chest pain or shortness of breath,  Objective   Vital Signs:  /68 (BP Location: Right arm, Patient Position: Sitting, Cuff Size: Adult)   Pulse 70   Temp 98 °F (36.7 °C) (Temporal)   Ht 162.6 cm (64.02\")   Wt 59.9 kg (132 lb)   SpO2 100%   BMI 22.65 kg/m²   Estimated body mass index is 22.65 kg/m² as calculated from the following:    Height as of this encounter: 162.6 cm (64.02\").    Weight as of this encounter: 59.9 kg (132 lb).    BMI is within normal parameters. No other follow-up for BMI required.      Physical Exam  Vitals and nursing note reviewed.   Constitutional:       General: She is not in acute distress.     Appearance: Normal appearance. She is well-developed. She is not ill-appearing, toxic-appearing or diaphoretic.   HENT:      Head: Normocephalic and atraumatic.      Right Ear: Tympanic membrane, ear canal and external ear normal. There is no impacted cerumen.      Left Ear: Tympanic membrane, ear canal and external ear normal. There is no impacted cerumen.      Nose: Nose normal. No congestion or rhinorrhea.      Mouth/Throat:      Mouth: Mucous membranes are moist.      Pharynx: Oropharynx is clear. No oropharyngeal exudate or posterior oropharyngeal erythema.   Eyes:      General: No scleral icterus.        Right eye: No discharge.         Left eye: No discharge.      Extraocular Movements: Extraocular movements intact.      Conjunctiva/sclera: Conjunctivae normal.      Pupils: Pupils are equal, round, and reactive to light.   Neck:      Thyroid: No thyromegaly.      Vascular: No carotid bruit or JVD.      Trachea: No tracheal deviation.   Cardiovascular:      Rate and " Rhythm: Normal rate and regular rhythm.      Heart sounds: Normal heart sounds. No murmur heard.    No friction rub. No gallop.   Pulmonary:      Effort: Pulmonary effort is normal. No respiratory distress.      Breath sounds: Normal breath sounds. No stridor. No wheezing, rhonchi or rales.   Chest:      Chest wall: No tenderness.   Abdominal:      General: Bowel sounds are normal. There is no distension.      Palpations: Abdomen is soft. There is no mass.      Tenderness: There is no abdominal tenderness. There is no right CVA tenderness, left CVA tenderness, guarding or rebound.      Hernia: No hernia is present.   Musculoskeletal:         General: Normal range of motion.      Cervical back: Normal range of motion and neck supple. No rigidity or tenderness.      Right lower leg: No edema.      Left lower leg: No edema.      Comments: Patient using a cane for ambulation   Lymphadenopathy:      Cervical: No cervical adenopathy.   Skin:     General: Skin is warm and dry.      Coloration: Skin is not jaundiced.   Neurological:      General: No focal deficit present.      Mental Status: She is alert and oriented to person, place, and time. Mental status is at baseline.      Sensory: No sensory deficit.      Motor: No weakness or abnormal muscle tone.      Coordination: Coordination normal.      Gait: Gait normal.      Deep Tendon Reflexes: Reflexes normal.   Psychiatric:         Mood and Affect: Mood normal.         Behavior: Behavior normal.         Thought Content: Thought content normal.         Judgment: Judgment normal.        Result Review :                Assessment and Plan   Diagnoses and all orders for this visit:    1. High cholesterol (Primary)  -     Cancel: CBC & Differential  -     Cancel: Comprehensive Metabolic Panel  -     Cancel: Lipid Panel  -     Cancel: CBC & Differential  -     Cancel: Comprehensive Metabolic Panel  -     Cancel: Lipid Panel  -     Cancel: PTH, Intact & Calcium  -     CBC &  Differential  -     Comprehensive Metabolic Panel  -     Lipid Panel  -     POC Urinalysis Dipstick, Automated  -     TSH  -     PTH, Intact  -     Vitamin D 25 hydroxy    2. Hypercalcemia  -     Cancel: CBC & Differential  -     Cancel: Comprehensive Metabolic Panel  -     Cancel: Lipid Panel  -     Cancel: CBC & Differential  -     Cancel: Comprehensive Metabolic Panel  -     Cancel: Lipid Panel  -     Cancel: PTH, Intact & Calcium  -     CBC & Differential  -     Comprehensive Metabolic Panel  -     Lipid Panel  -     POC Urinalysis Dipstick, Automated  -     TSH  -     PTH, Intact  -     Vitamin D 25 hydroxy    3. Essential hypertension  -     Cancel: CBC & Differential  -     Cancel: Comprehensive Metabolic Panel  -     Cancel: Lipid Panel  -     Cancel: CBC & Differential  -     Cancel: Comprehensive Metabolic Panel  -     Cancel: Lipid Panel  -     Cancel: PTH, Intact & Calcium  -     CBC & Differential  -     Comprehensive Metabolic Panel  -     Lipid Panel  -     POC Urinalysis Dipstick, Automated  -     TSH  -     PTH, Intact  -     Vitamin D 25 hydroxy             Follow Up   Return in about 3 months (around 12/7/2022).  Patient was given instructions and counseling regarding her condition or for health maintenance advice. Please see specific information pulled into the AVS if appropriate.

## 2022-09-08 LAB
25(OH)D3+25(OH)D2 SERPL-MCNC: 42.9 NG/ML (ref 30–100)
ALBUMIN SERPL-MCNC: 5.1 G/DL (ref 3.8–4.8)
ALBUMIN/GLOB SERPL: 2.1 {RATIO} (ref 1.2–2.2)
ALP SERPL-CCNC: 89 IU/L (ref 44–121)
ALT SERPL-CCNC: 27 IU/L (ref 0–32)
AST SERPL-CCNC: 26 IU/L (ref 0–40)
BASOPHILS # BLD AUTO: 0.1 X10E3/UL (ref 0–0.2)
BASOPHILS NFR BLD AUTO: 1 %
BILIRUB SERPL-MCNC: 0.4 MG/DL (ref 0–1.2)
BUN SERPL-MCNC: 18 MG/DL (ref 8–27)
BUN/CREAT SERPL: 22 (ref 12–28)
CALCIUM SERPL-MCNC: 10.3 MG/DL (ref 8.7–10.3)
CHLORIDE SERPL-SCNC: 102 MMOL/L (ref 96–106)
CHOLEST SERPL-MCNC: 145 MG/DL (ref 100–199)
CO2 SERPL-SCNC: 24 MMOL/L (ref 20–29)
CREAT SERPL-MCNC: 0.81 MG/DL (ref 0.57–1)
EGFRCR-CYS SERPLBLD CKD-EPI 2021: 83 ML/MIN/1.73
EOSINOPHIL # BLD AUTO: 0.5 X10E3/UL (ref 0–0.4)
EOSINOPHIL NFR BLD AUTO: 5 %
ERYTHROCYTE [DISTWIDTH] IN BLOOD BY AUTOMATED COUNT: 13.1 % (ref 11.7–15.4)
GLOBULIN SER CALC-MCNC: 2.4 G/DL (ref 1.5–4.5)
GLUCOSE SERPL-MCNC: 90 MG/DL (ref 65–99)
HCT VFR BLD AUTO: 45.3 % (ref 34–46.6)
HDLC SERPL-MCNC: 56 MG/DL
HGB BLD-MCNC: 15.5 G/DL (ref 11.1–15.9)
IMM GRANULOCYTES # BLD AUTO: 0 X10E3/UL (ref 0–0.1)
IMM GRANULOCYTES NFR BLD AUTO: 0 %
LDLC SERPL CALC-MCNC: 58 MG/DL (ref 0–99)
LYMPHOCYTES # BLD AUTO: 2.7 X10E3/UL (ref 0.7–3.1)
LYMPHOCYTES NFR BLD AUTO: 33 %
MCH RBC QN AUTO: 29 PG (ref 26.6–33)
MCHC RBC AUTO-ENTMCNC: 34.2 G/DL (ref 31.5–35.7)
MCV RBC AUTO: 85 FL (ref 79–97)
MONOCYTES # BLD AUTO: 0.7 X10E3/UL (ref 0.1–0.9)
MONOCYTES NFR BLD AUTO: 9 %
NEUTROPHILS # BLD AUTO: 4.4 X10E3/UL (ref 1.4–7)
NEUTROPHILS NFR BLD AUTO: 52 %
PLATELET # BLD AUTO: 253 X10E3/UL (ref 150–450)
POTASSIUM SERPL-SCNC: 5 MMOL/L (ref 3.5–5.2)
PROT SERPL-MCNC: 7.5 G/DL (ref 6–8.5)
PTH-INTACT SERPL-MCNC: 35 PG/ML (ref 15–65)
RBC # BLD AUTO: 5.35 X10E6/UL (ref 3.77–5.28)
SODIUM SERPL-SCNC: 140 MMOL/L (ref 134–144)
TRIGL SERPL-MCNC: 189 MG/DL (ref 0–149)
TSH SERPL DL<=0.005 MIU/L-ACNC: 2.8 UIU/ML (ref 0.45–4.5)
VLDLC SERPL CALC-MCNC: 31 MG/DL (ref 5–40)
WBC # BLD AUTO: 8.4 X10E3/UL (ref 3.4–10.8)

## 2022-09-16 ENCOUNTER — TELEPHONE (OUTPATIENT)
Dept: FAMILY MEDICINE CLINIC | Facility: CLINIC | Age: 62
End: 2022-09-16

## 2022-09-16 DIAGNOSIS — U07.1 COVID-19 VIRUS INFECTION: Primary | ICD-10-CM

## 2022-09-16 NOTE — TELEPHONE ENCOUNTER
Patient contacted me because she has symptoms of COVID for 1 day, she had a positive COVID test yesterday, she agreed to start monoclonal antibody , Paxlovid , because she has decreased immunity due to her Remicade, medication sent, patient understands risks of this medication and that is not experimental medication, also to hold her cholesterol medicine for 10 days, also using Buspar 2.5

## 2022-10-10 RX ORDER — BUSPIRONE HYDROCHLORIDE 10 MG/1
10 TABLET ORAL AS NEEDED
Status: CANCELLED | OUTPATIENT
Start: 2022-10-10

## 2022-10-17 RX ORDER — BUSPIRONE HYDROCHLORIDE 10 MG/1
10 TABLET ORAL AS NEEDED
Status: CANCELLED | OUTPATIENT
Start: 2022-10-17

## 2022-10-18 DIAGNOSIS — F41.9 ANXIETY: Primary | ICD-10-CM

## 2022-10-18 RX ORDER — BUSPIRONE HYDROCHLORIDE 10 MG/1
10 TABLET ORAL 3 TIMES DAILY
Qty: 90 TABLET | Refills: 3 | Status: SHIPPED | OUTPATIENT
Start: 2022-10-18 | End: 2023-01-11 | Stop reason: SDUPTHER

## 2022-11-10 DIAGNOSIS — K21.00 GASTROESOPHAGEAL REFLUX DISEASE WITH ESOPHAGITIS, UNSPECIFIED WHETHER HEMORRHAGE: ICD-10-CM

## 2022-11-10 RX ORDER — PANTOPRAZOLE SODIUM 20 MG/1
TABLET, DELAYED RELEASE ORAL
Qty: 90 TABLET | Refills: 1 | Status: SHIPPED | OUTPATIENT
Start: 2022-11-10

## 2022-12-07 ENCOUNTER — OFFICE VISIT (OUTPATIENT)
Dept: FAMILY MEDICINE CLINIC | Facility: CLINIC | Age: 62
End: 2022-12-07

## 2022-12-07 VITALS
HEART RATE: 81 BPM | HEIGHT: 64 IN | BODY MASS INDEX: 23.76 KG/M2 | DIASTOLIC BLOOD PRESSURE: 86 MMHG | RESPIRATION RATE: 16 BRPM | TEMPERATURE: 97.7 F | OXYGEN SATURATION: 98 % | WEIGHT: 139.2 LBS | SYSTOLIC BLOOD PRESSURE: 126 MMHG

## 2022-12-07 DIAGNOSIS — E83.52 HYPERCALCEMIA: ICD-10-CM

## 2022-12-07 DIAGNOSIS — E78.00 HIGH CHOLESTEROL: Primary | ICD-10-CM

## 2022-12-07 DIAGNOSIS — I10 ESSENTIAL HYPERTENSION: ICD-10-CM

## 2022-12-07 PROCEDURE — 99214 OFFICE O/P EST MOD 30 MIN: CPT | Performed by: FAMILY MEDICINE

## 2022-12-07 RX ORDER — CLOBETASOL PROPIONATE 0.5 MG/G
OINTMENT TOPICAL
COMMUNITY
Start: 2022-11-28

## 2022-12-07 RX ORDER — FLUCONAZOLE 150 MG/1
TABLET ORAL
COMMUNITY
Start: 2022-11-05

## 2022-12-07 NOTE — PROGRESS NOTES
"Chief Complaint  Hypertension    Subjective        Bonnie ROSIO Syed presents to [facility]  History of Present Illness  Fasting, doing great , no CP/SOA  Objective   Vital Signs:  /86 (BP Location: Right arm, Patient Position: Sitting, Cuff Size: Adult)   Pulse 81   Temp 97.7 °F (36.5 °C) (Temporal)   Resp 16   Ht 162.6 cm (64.02\")   Wt 63.1 kg (139 lb 3.2 oz)   SpO2 98%   BMI 23.88 kg/m²   Estimated body mass index is 23.88 kg/m² as calculated from the following:    Height as of this encounter: 162.6 cm (64.02\").    Weight as of this encounter: 63.1 kg (139 lb 3.2 oz).    BMI is within normal parameters. No other follow-up for BMI required.      Physical Exam  Vitals and nursing note reviewed.   Constitutional:       General: She is not in acute distress.     Appearance: Normal appearance. She is well-developed. She is not ill-appearing, toxic-appearing or diaphoretic.   HENT:      Head: Normocephalic and atraumatic.      Right Ear: Tympanic membrane, ear canal and external ear normal. There is no impacted cerumen.      Left Ear: Tympanic membrane, ear canal and external ear normal. There is no impacted cerumen.      Nose: Nose normal. No congestion or rhinorrhea.      Mouth/Throat:      Mouth: Mucous membranes are moist.      Pharynx: Oropharynx is clear. No oropharyngeal exudate or posterior oropharyngeal erythema.   Eyes:      General: No scleral icterus.        Right eye: No discharge.         Left eye: No discharge.      Extraocular Movements: Extraocular movements intact.      Conjunctiva/sclera: Conjunctivae normal.      Pupils: Pupils are equal, round, and reactive to light.   Neck:      Thyroid: No thyromegaly.      Vascular: No carotid bruit or JVD.      Trachea: No tracheal deviation.   Cardiovascular:      Rate and Rhythm: Normal rate and regular rhythm.      Heart sounds: Normal heart sounds. No murmur heard.    No friction rub. No gallop.   Pulmonary:      " Effort: Pulmonary effort is normal. No respiratory distress.      Breath sounds: Normal breath sounds. No stridor. No wheezing, rhonchi or rales.   Chest:      Chest wall: No tenderness.   Abdominal:      General: Bowel sounds are normal. There is no distension.      Palpations: Abdomen is soft. There is no mass.      Tenderness: There is no abdominal tenderness. There is no right CVA tenderness, left CVA tenderness, guarding or rebound.      Hernia: No hernia is present.   Musculoskeletal:         General: Normal range of motion.      Cervical back: Normal range of motion and neck supple. No rigidity or tenderness.      Right lower leg: No edema.      Left lower leg: No edema.   Lymphadenopathy:      Cervical: No cervical adenopathy.   Skin:     General: Skin is warm and dry.      Coloration: Skin is not jaundiced.   Neurological:      General: No focal deficit present.      Mental Status: She is alert and oriented to person, place, and time. Mental status is at baseline.      Sensory: No sensory deficit.      Motor: No weakness or abnormal muscle tone.      Coordination: Coordination normal.      Gait: Gait normal.      Deep Tendon Reflexes: Reflexes normal.   Psychiatric:         Mood and Affect: Mood normal.         Behavior: Behavior normal.         Thought Content: Thought content normal.         Judgment: Judgment normal.        Result Review :                Assessment and Plan   Diagnoses and all orders for this visit:    1. High cholesterol (Primary)  -     CBC & Differential  -     Comprehensive Metabolic Panel  -     Lipid Panel    2. Hypercalcemia  -     CBC & Differential  -     Comprehensive Metabolic Panel  -     Lipid Panel    3. Essential hypertension  -     CBC & Differential  -     Comprehensive Metabolic Panel  -     Lipid Panel             Follow Up   Return in about 6 months (around 6/7/2023) for recheck labs.  Patient was given instructions and counseling regarding her condition or for health  maintenance advice. Please see specific information pulled into the AVS if appropriate.

## 2022-12-08 LAB
ALBUMIN SERPL-MCNC: 4.9 G/DL (ref 3.5–5.2)
ALBUMIN/GLOB SERPL: 2.3 G/DL
ALP SERPL-CCNC: 81 U/L (ref 39–117)
ALT SERPL-CCNC: 25 U/L (ref 1–33)
AST SERPL-CCNC: 30 U/L (ref 1–32)
BASOPHILS # BLD AUTO: 0.05 10*3/MM3 (ref 0–0.2)
BASOPHILS NFR BLD AUTO: 0.6 % (ref 0–1.5)
BILIRUB SERPL-MCNC: 0.4 MG/DL (ref 0–1.2)
BUN SERPL-MCNC: 15 MG/DL (ref 8–23)
BUN/CREAT SERPL: 18.5 (ref 7–25)
CALCIUM SERPL-MCNC: 9.6 MG/DL (ref 8.6–10.5)
CHLORIDE SERPL-SCNC: 102 MMOL/L (ref 98–107)
CHOLEST SERPL-MCNC: 131 MG/DL (ref 0–200)
CO2 SERPL-SCNC: 26.3 MMOL/L (ref 22–29)
CREAT SERPL-MCNC: 0.81 MG/DL (ref 0.57–1)
EGFRCR SERPLBLD CKD-EPI 2021: 82.2 ML/MIN/1.73
EOSINOPHIL # BLD AUTO: 0.36 10*3/MM3 (ref 0–0.4)
EOSINOPHIL NFR BLD AUTO: 4.3 % (ref 0.3–6.2)
ERYTHROCYTE [DISTWIDTH] IN BLOOD BY AUTOMATED COUNT: 13 % (ref 12.3–15.4)
GLOBULIN SER CALC-MCNC: 2.1 GM/DL
GLUCOSE SERPL-MCNC: 91 MG/DL (ref 65–99)
HCT VFR BLD AUTO: 41.6 % (ref 34–46.6)
HDLC SERPL-MCNC: 55 MG/DL (ref 40–60)
HGB BLD-MCNC: 13.7 G/DL (ref 12–15.9)
IMM GRANULOCYTES # BLD AUTO: 0.02 10*3/MM3 (ref 0–0.05)
IMM GRANULOCYTES NFR BLD AUTO: 0.2 % (ref 0–0.5)
LDLC SERPL CALC-MCNC: 52 MG/DL (ref 0–100)
LYMPHOCYTES # BLD AUTO: 2.9 10*3/MM3 (ref 0.7–3.1)
LYMPHOCYTES NFR BLD AUTO: 34.3 % (ref 19.6–45.3)
MCH RBC QN AUTO: 28.7 PG (ref 26.6–33)
MCHC RBC AUTO-ENTMCNC: 32.9 G/DL (ref 31.5–35.7)
MCV RBC AUTO: 87 FL (ref 79–97)
MONOCYTES # BLD AUTO: 0.84 10*3/MM3 (ref 0.1–0.9)
MONOCYTES NFR BLD AUTO: 9.9 % (ref 5–12)
NEUTROPHILS # BLD AUTO: 4.28 10*3/MM3 (ref 1.7–7)
NEUTROPHILS NFR BLD AUTO: 50.7 % (ref 42.7–76)
NRBC BLD AUTO-RTO: 0 /100 WBC (ref 0–0.2)
PLATELET # BLD AUTO: 228 10*3/MM3 (ref 140–450)
POTASSIUM SERPL-SCNC: 4 MMOL/L (ref 3.5–5.2)
PROT SERPL-MCNC: 7 G/DL (ref 6–8.5)
RBC # BLD AUTO: 4.78 10*6/MM3 (ref 3.77–5.28)
SODIUM SERPL-SCNC: 139 MMOL/L (ref 136–145)
TRIGL SERPL-MCNC: 137 MG/DL (ref 0–150)
VLDLC SERPL CALC-MCNC: 24 MG/DL (ref 5–40)
WBC # BLD AUTO: 8.45 10*3/MM3 (ref 3.4–10.8)

## 2023-01-01 RX ORDER — METOPROLOL SUCCINATE 100 MG/1
TABLET, EXTENDED RELEASE ORAL
Qty: 90 TABLET | Refills: 3 | Status: SHIPPED | OUTPATIENT
Start: 2023-01-01

## 2023-01-11 DIAGNOSIS — F41.9 ANXIETY: ICD-10-CM

## 2023-01-12 RX ORDER — BUSPIRONE HYDROCHLORIDE 10 MG/1
10 TABLET ORAL 3 TIMES DAILY
Qty: 90 TABLET | Refills: 3 | Status: SHIPPED | OUTPATIENT
Start: 2023-01-12

## 2023-04-11 ENCOUNTER — OFFICE VISIT (OUTPATIENT)
Dept: INTERNAL MEDICINE | Facility: CLINIC | Age: 63
End: 2023-04-11
Payer: COMMERCIAL

## 2023-04-11 VITALS
WEIGHT: 135.4 LBS | SYSTOLIC BLOOD PRESSURE: 118 MMHG | OXYGEN SATURATION: 98 % | HEART RATE: 78 BPM | HEIGHT: 64 IN | BODY MASS INDEX: 23.12 KG/M2 | DIASTOLIC BLOOD PRESSURE: 76 MMHG

## 2023-04-11 DIAGNOSIS — Z79.60 LONG-TERM USE OF IMMUNOSUPPRESSANT MEDICATION: ICD-10-CM

## 2023-04-11 DIAGNOSIS — E78.2 MIXED HYPERLIPIDEMIA: Primary | ICD-10-CM

## 2023-04-11 RX ORDER — NITROFURANTOIN MACROCRYSTALS 50 MG/1
CAPSULE ORAL DAILY
COMMUNITY

## 2023-04-11 NOTE — ASSESSMENT & PLAN NOTE
Lipid abnormalities are improving with treatment.  Continue current medication  Lipids will be reassessed in 6 months.

## 2023-04-11 NOTE — PROGRESS NOTES
"  Thomas Lino M.D.  Internal Medicine  [facility]  [address], [address]  [place] [address]  [phone]      Chief Complaint  Establish Care and Hypertension    SUBJECTIVE    History of Present Illness    Bonnie Syed is a 62 y.o. female who presents to the office today as a new patient to establish care.     Hypertension-on metoprolol only. Previously on atenolol but this \"stopped\" working. Denies Chest pain, pressure, shortness of breath.     Hyperlipidemia-on rosuvastatin    GERD-on protonix    History of kidney stone    Allergies-follows with allergy and gets allergy shots.     History of breast cancer-in 2008. S/p left mastectomy. Follows with Dr. Roman.     Rheumatoid arthritis-Follows with [facility]. On Remicade. Hands are most effected.     Osteopenia-previously on Boniva. She had femur fracture with this. She uses a cane.     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    Review of Systems    Allergies   Allergen Reactions   • Ibandronic Acid Other (See Comments)     FRACTURED LEFT FEMUR  FRACTURED LEFT FEMUR  FRACTURED LEFT FEMUR   • Monistat [Miconazole] Other (See Comments)     Pt states she gets red and feels like she is \"BURNING.\"   • Sulfa Antibiotics Hives and Swelling        Outpatient Medications Marked as Taking for the 4/11/23 encounter (Office Visit) with Thomas Lino MD   Medication Sig Dispense Refill   • Biotin 5000 MCG capsule Take 10,000 mcg by mouth Daily.     • busPIRone (BUSPAR) 10 MG tablet Take 1 tablet by mouth 3 (Three) Times a Day. 90 tablet 3   • calcium carbonate-vitamin d (CALTRATE 600+D) 600-400 MG-UNIT per tablet One tab po BID for osteopenia (Patient taking differently: Take 3 tablets by mouth 2 (Two) Times a Day. One tab po BID for osteopenia  2 TABS IN AM  1 TAB IN PM)     • clobetasol (TEMOVATE) 0.05 % ointment Apply  topically to the appropriate area as directed.     • inFLIXimab (REMICADE IV) Infuse  into a venous catheter. Once a " month for arthritis     • metoprolol succinate XL (TOPROL-XL) 100 MG 24 hr tablet TAKE 1 TABLET BY MOUTH EVERY DAY 90 tablet 3   • montelukast (SINGULAIR) 10 MG tablet Take 1 tablet by mouth Daily.     • Multiple Vitamin (MULTIVITAMIN) capsule Take 1 capsule by mouth Daily. HOLD PRIOR TO SURGERY     • nitrofurantoin (MACRODANTIN) 50 MG capsule Take  by mouth Daily. She takes day before infusion and day of infusion and day after     • pantoprazole (PROTONIX) 20 MG EC tablet TAKE 1 TABLET BY MOUTH EVERY DAY 90 tablet 1   • rosuvastatin (Crestor) 10 MG tablet Take 1 tablet by mouth Every Night. 90 tablet 3   • [DISCONTINUED] fluconazole (DIFLUCAN) 150 MG tablet           Past Medical History:   Diagnosis Date   • Allergic     takes 2 allergy injections weekly   • Bladder stone    • Breast cancer     left-- had a mastectomy   • Cancer    • Chronic UTI    • Fracture, femur 11/2018   • GERD (gastroesophageal reflux disease)    • Headache     Sinus headaches, migraines about once a year   • History of migraine    • Hypertension    • Osteopenia    • Reflux gastritis    • Renal stone     RIGHT DEC 2021   • Rheumatoid arthritis    • Seborrheic dermatitis     SCLAP AREA-DRY ITCHY SKIN   • Unsteady gait     LEFT HIP   • Visual impairment     Reading Glasses while on PC   • Weakness     LEFT HIP     Past Surgical History:   Procedure Laterality Date   • BREAST SURGERY      MALIGNANT   • BUNIONECTOMY Left    • COLONOSCOPY     • COLONOSCOPY N/A 07/18/2022    Procedure: COLONOSCOPY TO CECUM WITH COLD SNARE POLYPECTOMY;  Surgeon: Jamie Reyes MD;  Location: [facility] ENDOSCOPY;  Service: Gastroenterology;  Laterality: N/A;  PRE- SCREENING  POST- COLON POLYP   • DILATATION AND CURETTAGE     • EXTRACORPOREAL SHOCK WAVE LITHOTRIPSY (ESWL) Right 12/17/2021    Procedure: RIGHT EXTRACORPOREAL SHOCKWAVE LITHOTRIPSY AND CYSTOSCOPY;  Surgeon: Curtis Victor MD;  [place] [facility] [place] [facility]  Service: Urology;  Laterality: Right;   •  "FEMUR IM NAILING/RODDING Left 11/08/2018    Procedure: FEMUR INTRAMEDULLARY NAILING/RODDING;  Surgeon: Samy Altamirano II, MD;  Location: [facility];  Service: Orthopedics   • FRACTURE SURGERY  Nov. 2018    Femur broke due to taking Boniva   • HYSTERECTOMY     • JOINT REPLACEMENT  2020    Left hip replacement   • MASTECTOMY Left 2008    MALIGNANT   • TOTAL HIP ARTHROPLASTY Left 01/25/2020    Procedure: left TOTAL HIP arthroplasty, orif femur and removal of hardware;  Surgeon: Samy Altamirano II, MD;  Location: [facility];  Service: Orthopedics   • TRAM FLAP REVISION NIPPLE RECONSTRUCTION BREAST REDUCTION Left 2008     Family History   Problem Relation Age of Onset   • Hypertension Mother    • Arthritis Mother    • Hyperlipidemia Mother    • Hypertension Father    • Heart attack Maternal Grandfather    • Breast cancer Neg Hx    • Colon cancer Neg Hx    • Malig Hyperthermia Neg Hx     reports that she has never smoked. She has never used smokeless tobacco. She reports that she does not drink alcohol and does not use drugs.    OBJECTIVE    Vital Signs:   /76   Pulse 78   Ht 162.6 cm (64.02\")   Wt 61.4 kg (135 lb 6.4 oz)   SpO2 98%   BMI 23.23 kg/m²     Physical Exam  Constitutional:       Appearance: Normal appearance. She is normal weight.   HENT:      Right Ear: Tympanic membrane normal.      Left Ear: Tympanic membrane normal.   Cardiovascular:      Rate and Rhythm: Normal rate and regular rhythm.      Heart sounds: Normal heart sounds. No murmur heard.  Pulmonary:      Effort: Pulmonary effort is normal.      Breath sounds: Normal breath sounds.   Abdominal:      General: Abdomen is flat. There is no distension.      Palpations: Abdomen is soft.      Tenderness: There is no abdominal tenderness.   Musculoskeletal:      Comments: contractures   Skin:     General: Skin is warm and dry.   Neurological:      Mental Status: She is alert.   Psychiatric:         Mood and Affect: Mood " normal.         Behavior: Behavior normal.         Thought Content: Thought content normal.            The following data was reviewed by: Thomas Lino MD on 04/11/2023:  Common labs        6/1/2022    08:49 9/7/2022    08:31 12/7/2022    08:10   Common Labs   Glucose 94   90   91     BUN 18   18   15     Creatinine 0.88   0.81   0.81     Sodium 140   140   139     Potassium 5.2   5.0   4.0     Chloride 100   102   102     Calcium 11.2   10.3   9.6     Total Protein 8.5   7.5   7.0     Albumin 5.8   5.1   4.90     Total Bilirubin 0.5   0.4   0.4     Alkaline Phosphatase 108   89   81     AST (SGOT) 33   26   30     ALT (SGPT) 23   27   25     WBC 9.1   8.4   8.45     Hemoglobin 16.9   15.5   13.7     Hematocrit 52.2   45.3   41.6     Platelets 306   253   228     Total Cholesterol 261   145   131     Triglycerides 224   189   137     HDL Cholesterol 60   56   55     LDL Cholesterol  160   58   52       Data reviewed: Previous PCP note             ASSESSMENT & PLAN     Diagnoses and all orders for this visit:    1. Mixed hyperlipidemia (Primary)  Assessment & Plan:  Lipid abnormalities are improving with treatment.  Continue current medication  Lipids will be reassessed in 6 months.    Orders:  -     Lipid Panel With LDL / HDL Ratio; Future    2. Long-term use of immunosuppressant medication  -     CBC & Differential; Future        Health Maintenance Due   Topic Date Due   • COVID-19 Vaccine (4 - Booster for Pfizer series) 10/14/2021   • ANNUAL PHYSICAL  02/10/2023        Follow Up  Return in about 6 months (around 10/11/2023) for Annual physical.    Patient/family had no further questions at this time and verbalized understanding of the plan discussed today.

## 2023-05-09 DIAGNOSIS — K21.00 GASTROESOPHAGEAL REFLUX DISEASE WITH ESOPHAGITIS, UNSPECIFIED WHETHER HEMORRHAGE: ICD-10-CM

## 2023-05-09 RX ORDER — PANTOPRAZOLE SODIUM 20 MG/1
TABLET, DELAYED RELEASE ORAL
Qty: 90 TABLET | Refills: 1 | Status: SHIPPED | OUTPATIENT
Start: 2023-05-09

## 2023-06-02 DIAGNOSIS — F41.9 ANXIETY: ICD-10-CM

## 2023-06-02 RX ORDER — BUSPIRONE HYDROCHLORIDE 10 MG/1
10 TABLET ORAL 3 TIMES DAILY
Qty: 90 TABLET | Refills: 3 | Status: SHIPPED | OUTPATIENT
Start: 2023-06-02

## 2023-11-29 ENCOUNTER — OFFICE VISIT (OUTPATIENT)
Dept: INTERNAL MEDICINE | Facility: CLINIC | Age: 63
End: 2023-11-29
Payer: COMMERCIAL

## 2023-11-29 VITALS
HEIGHT: 64 IN | HEART RATE: 76 BPM | OXYGEN SATURATION: 97 % | BODY MASS INDEX: 22.2 KG/M2 | WEIGHT: 130 LBS | DIASTOLIC BLOOD PRESSURE: 80 MMHG | SYSTOLIC BLOOD PRESSURE: 145 MMHG

## 2023-11-29 DIAGNOSIS — Z00.00 WELLNESS EXAMINATION: Primary | ICD-10-CM

## 2023-11-29 PROCEDURE — 99396 PREV VISIT EST AGE 40-64: CPT | Performed by: STUDENT IN AN ORGANIZED HEALTH CARE EDUCATION/TRAINING PROGRAM

## 2023-11-29 RX ORDER — NITROFURANTOIN MACROCRYSTALS 100 MG/1
CAPSULE ORAL
COMMUNITY
Start: 2023-11-28

## 2023-11-29 NOTE — PROGRESS NOTES
"  Thomas Lino M.D.  Internal Medicine  [facility]  [address], [address]  [place] [address]  [phone]      Chief Complaint  Annual Exam (CPE /)    SUBJECTIVE    History of Present Illness    Bonnie Syed is a 63 y.o. female who presents to the office today as an established patient that last saw me on 4/11/2023.     Hypertension-on metoprolol only. Denies Chest pain, pressure, shortness of breath. Attributes elevated BP to White coat syndrome. BP normal during Remicade infusions.      Hyperlipidemia-on rosuvastatin     GERD-on protonix     History of kidney stone. [facility] Has frequent UTIs.      Allergies-follows with allergy and gets allergy shots.      History of breast cancer-in 2008. S/p left mastectomy. Follows with Dr. Roman.      Rheumatoid arthritis-Follows with [facility]. On Remicade. Hands are most effected.      Osteopenia-previously on Boniva. She had femur fracture with this. She uses a cane.     Had implants replaced in September.     Takes Buspar as needed. Does not need often.     Dealing with vaginal atrophy. Using replenish. May start topical estrogen.     Review of Systems    Allergies   Allergen Reactions    Ibandronic Acid Other (See Comments)     FRACTURED LEFT FEMUR  FRACTURED LEFT FEMUR  FRACTURED LEFT FEMUR    Monistat [Miconazole] Other (See Comments)     Pt states she gets red and feels like she is \"BURNING.\"    Sulfa Antibiotics Hives and Swelling        Outpatient Medications Marked as Taking for the 11/29/23 encounter (Office Visit) with Thomas Lino MD   Medication Sig Dispense Refill    Biotin 5000 MCG capsule Take 10,000 mcg by mouth Daily.      busPIRone (BUSPAR) 10 MG tablet Take 1 tablet by mouth 3 (Three) Times a Day. 90 tablet 3    calcium carbonate-vitamin d (CALTRATE 600+D) 600-400 MG-UNIT per tablet One tab po BID for osteopenia (Patient taking differently: Take 3 tablets by mouth 2 (Two) Times a Day. " One tab po BID for osteopenia  2 TABS IN AM  1 TAB IN PM)      inFLIXimab (REMICADE IV) Infuse  into a venous catheter. Once a month for arthritis      levocetirizine (XYZAL) 5 MG tablet Take 1 tablet by mouth Daily.      metoprolol succinate XL (TOPROL-XL) 100 MG 24 hr tablet TAKE 1 TABLET BY MOUTH EVERY DAY 90 tablet 3    montelukast (SINGULAIR) 10 MG tablet Take 1 tablet by mouth Daily.      Multiple Vitamin (MULTIVITAMIN) capsule Take 1 capsule by mouth Daily. HOLD PRIOR TO SURGERY      nitrofurantoin (MACRODANTIN) 100 MG capsule       pantoprazole (PROTONIX) 20 MG EC tablet TAKE 1 TABLET BY MOUTH EVERY DAY 90 tablet 1    rosuvastatin (Crestor) 10 MG tablet Take 1 tablet by mouth Every Night. 90 tablet 3        Past Medical History:   Diagnosis Date    Allergic     takes 2 allergy injections weekly    Bladder stone     Breast cancer     left-- had a mastectomy    Cancer     Chronic UTI     Fracture, femur 11/2018    GERD (gastroesophageal reflux disease)     Headache     Sinus headaches, migraines about once a year    History of migraine     Hypertension     Osteopenia     Reflux gastritis     Renal stone     RIGHT DEC 2021    Rheumatoid arthritis     Seborrheic dermatitis     SCLAP AREA-DRY ITCHY SKIN    Unsteady gait     LEFT HIP    Visual impairment     Reading Glasses while on PC    Weakness     LEFT HIP     Past Surgical History:   Procedure Laterality Date    BREAST SURGERY      MALIGNANT    BUNIONECTOMY Left     COLONOSCOPY      COLONOSCOPY N/A 07/18/2022    Procedure: COLONOSCOPY TO CECUM WITH COLD SNARE POLYPECTOMY;  Surgeon: Jamie Reyes MD;  Location: [facility] ENDOSCOPY;  Service: Gastroenterology;  Laterality: N/A;  PRE- SCREENING  POST- COLON POLYP    DILATATION AND CURETTAGE      EXTRACORPOREAL SHOCK WAVE LITHOTRIPSY (ESWL) Right 12/17/2021    Procedure: RIGHT EXTRACORPOREAL SHOCKWAVE LITHOTRIPSY AND CYSTOSCOPY;  Surgeon: Curtis Victor MD;  Location: [facility] OR [facility];  Service: Urology;  " Laterality: Right;    FEMUR IM NAILING/RODDING Left 11/08/2018    Procedure: FEMUR INTRAMEDULLARY NAILING/RODDING;  Surgeon: Samy Altamirano II, MD;  Location: [facility];  Service: Orthopedics    FRACTURE SURGERY  Nov. 2018    Femur broke due to taking Boniva    HYSTERECTOMY      JOINT REPLACEMENT  2020    Left hip replacement    MASTECTOMY Left 2008    MALIGNANT    TOTAL HIP ARTHROPLASTY Left 01/25/2020    Procedure: left TOTAL HIP arthroplasty, orif femur and removal of hardware;  Surgeon: Samy Altamirano II, MD;  Location: [facility];  Service: Orthopedics    TRAM FLAP REVISION NIPPLE RECONSTRUCTION BREAST REDUCTION Left 2008     Family History   Problem Relation Age of Onset    Hypertension Mother     Arthritis Mother     Hyperlipidemia Mother     Hypertension Father     Heart attack Maternal Grandfather     Breast cancer Neg Hx     Colon cancer Neg Hx     Malig Hyperthermia Neg Hx     reports that she has never smoked. She has never used smokeless tobacco. She reports that she does not drink alcohol and does not use drugs.    OBJECTIVE    Vital Signs:   /80   Pulse 76   Ht 162.6 cm (64.02\")   Wt 59 kg (130 lb)   SpO2 97%   BMI 22.30 kg/m²     Physical Exam  Constitutional:       Appearance: Normal appearance. She is normal weight.   HENT:      Right Ear: Tympanic membrane normal.      Left Ear: Tympanic membrane normal.   Cardiovascular:      Rate and Rhythm: Normal rate and regular rhythm.      Heart sounds: Normal heart sounds. No murmur heard.  Pulmonary:      Effort: Pulmonary effort is normal.      Breath sounds: Normal breath sounds.   Abdominal:      General: Abdomen is flat. There is no distension.      Palpations: Abdomen is soft.      Tenderness: There is no abdominal tenderness.   Musculoskeletal:      Right lower leg: No edema.      Left lower leg: No edema.   Skin:     General: Skin is warm and dry.   Neurological:      Mental Status: She is alert. "   Psychiatric:         Mood and Affect: Mood normal.         Behavior: Behavior normal.         Thought Content: Thought content normal.            The following data was reviewed by: Thomas Lino MD on 11/29/2023:  CMP          12/7/2022    08:10   CMP   Glucose 91    BUN 15    Creatinine 0.81    Sodium 139    Potassium 4.0    Chloride 102    Calcium 9.6    Total Protein 7.0    Albumin 4.90    Globulin 2.1    Total Bilirubin 0.4    Alkaline Phosphatase 81    AST (SGOT) 30    ALT (SGPT) 25    BUN/Creatinine Ratio 18.5      CBC w/diff          12/7/2022    08:10 10/6/2023    08:07   CBC w/Diff   WBC 8.45  7.55    RBC 4.78  4.75    Hemoglobin 13.7  12.8    Hematocrit 41.6  39.2    MCV 87.0  82.5    MCH 28.7  26.9    MCHC 32.9  32.7    RDW 13.0  13.3    Platelets 228  291    Neutrophil Rel % 50.7  55.9    Lymphocyte Rel % 34.3  29.5    Monocyte Rel % 9.9  8.9    Eosinophil Rel % 4.3  4.6    Basophil Rel % 0.6  0.8      Lipid Panel          12/7/2022    08:10 10/6/2023    08:07   Lipid Panel   Total Cholesterol 131  127    Triglycerides 137  110    HDL Cholesterol 55  50    VLDL Cholesterol 24  20    LDL Cholesterol  52  57    LDL/HDL Ratio  1.10                    ASSESSMENT & PLAN     Diagnoses and all orders for this visit:    1. Wellness examination (Primary)        #Annual Preventative Health Examination   -Age and sex appropriate physical exam performed and documented. Updated past medical, family, social and surgical histories as well as allergies and care team list. Addressed care gaps listed in the medical record.  -Encouraged minimum of 30 minutes or more of exercise at a brisk walk or higher 5 days per week combined with a well-balanced diet.  -Immunizations reviewed and updated in EMR.  -Advised that all women who are planning or capable of pregnancy take a daily supplement containing 0.4 to 0.8 mg (400 to 800 ?g) of folic acid.  The ASCVD Risk score (Lewis DESAI, et al., 2019) failed to calculate for the  following reasons:    The valid total cholesterol range is 130 to 320 mg/dL   -Lipid screening:   Lipid Panel          12/7/2022    08:10 10/6/2023    08:07   Lipid Panel   Total Cholesterol 131  127    Triglycerides 137  110    HDL Cholesterol 55  50    VLDL Cholesterol 24  20    LDL Cholesterol  52  57    LDL/HDL Ratio  1.10       -Depression screening: PHQ2 performed and the patient's screen was negative.  -Colon cancer screening: Patient is already up to date on their colon cancer screening with colonoscopy is indicated again in 2027  -Lung cancer screening: Patient has never smoked.  -Breast cancer screening: Follows with Dr. Roman.       Health Maintenance Due   Topic Date Due    ANNUAL PHYSICAL  02/10/2023        Follow Up  Return in about 6 months (around 5/29/2024).    Patient/family had no further questions at this time and verbalized understanding of the plan discussed today.

## 2023-12-08 DIAGNOSIS — N39.0 FREQUENT UTI: Primary | ICD-10-CM

## 2023-12-14 RX ORDER — AMLODIPINE BESYLATE 5 MG/1
5 TABLET ORAL DAILY
Qty: 90 TABLET | Refills: 2 | Status: SHIPPED | OUTPATIENT
Start: 2023-12-14

## 2023-12-26 ENCOUNTER — TELEPHONE (OUTPATIENT)
Dept: INTERNAL MEDICINE | Facility: CLINIC | Age: 63
End: 2023-12-26
Payer: COMMERCIAL

## 2023-12-26 NOTE — TELEPHONE ENCOUNTER
Pt called stating she has been having concerns about BP readings and she has been going back and forth with messages with Christine and Dr. Lino- pt states her readings have still been higher and she has been keeping a log like Dr. Lino had advised- Pt is scheduled tomorrow for a video visit with Dr. Lino to discuss Bp concerns and current BP medications- Will a video visit be okay? Please advise

## 2023-12-27 ENCOUNTER — TELEMEDICINE (OUTPATIENT)
Dept: INTERNAL MEDICINE | Facility: CLINIC | Age: 63
End: 2023-12-27
Payer: COMMERCIAL

## 2023-12-27 DIAGNOSIS — I10 ESSENTIAL HYPERTENSION: Primary | Chronic | ICD-10-CM

## 2023-12-27 PROCEDURE — 99213 OFFICE O/P EST LOW 20 MIN: CPT | Performed by: STUDENT IN AN ORGANIZED HEALTH CARE EDUCATION/TRAINING PROGRAM

## 2023-12-27 RX ORDER — AMLODIPINE BESYLATE 10 MG/1
10 TABLET ORAL DAILY
Qty: 90 TABLET | Refills: 0 | Status: SHIPPED | OUTPATIENT
Start: 2023-12-27

## 2023-12-27 NOTE — PROGRESS NOTES
"  Thomas Lino M.D.  Internal Medicine  [facility]  [address], [address]  [place] [address]  [phone]      Chief Complaint  No chief complaint on file.    SUBJECTIVE    History of Present Illness    Bonnie Syed is a 63 y.o. female who presents to the office today as an established patient that last saw me on 11/29/2023.     Mode of Visit: Video  Location of patient: home  Location of provider: [facility]  You have chosen to receive care through a telehealth visit.  Does the patient consent to use a video/audio connection their medical care today? yes  The visit included audio and video interaction. No technical issues occurred during this visit.       Here today for hypertension.  She is on amlodipine 5 mg . Previously on metoprolol. Felt metoprolol stopped working. No symptoms of HTN. Headache better with chiropractor.       BP has  been in 130’s/80's-90's  and the heart rate is anywhere from 80s to 100, (still high for me) which to me is a sign either this isn’t working or not had enough time or not strong enough. Notes heart rate is high.  To fix this solution. Buspirone helps fast heart rate     Review of Systems    Allergies   Allergen Reactions    Ibandronic Acid Other (See Comments)     FRACTURED LEFT FEMUR  FRACTURED LEFT FEMUR  FRACTURED LEFT FEMUR    Monistat [Miconazole] Other (See Comments)     Pt states she gets red and feels like she is \"BURNING.\"    Sulfa Antibiotics Hives and Swelling    Misc. Sulfonamide Containing Compounds Rash        Outpatient Medications Marked as Taking for the 12/27/23 encounter (Telemedicine) with Thomas Lino MD   Medication Sig Dispense Refill    amLODIPine (NORVASC) 10 MG tablet Take 1 tablet by mouth Daily. 90 tablet 0        Past Medical History:   Diagnosis Date    Allergic     takes 2 allergy injections weekly    Bladder stone     Breast cancer     left-- had a mastectomy    Cancer     Chronic UTI     Fracture, femur 11/2018    GERD " (gastroesophageal reflux disease)     Headache     Sinus headaches, migraines about once a year    History of migraine     Hypertension     Osteopenia     Reflux gastritis     Renal stone     RIGHT DEC 2021    Rheumatoid arthritis     Seborrheic dermatitis     SCLAP AREA-DRY ITCHY SKIN    Unsteady gait     LEFT HIP    Visual impairment     Reading Glasses while on PC    Weakness     LEFT HIP     Past Surgical History:   Procedure Laterality Date    BREAST SURGERY      MALIGNANT    BUNIONECTOMY Left     COLONOSCOPY      COLONOSCOPY N/A 07/18/2022    Procedure: COLONOSCOPY TO CECUM WITH COLD SNARE POLYPECTOMY;  Surgeon: Jamei Reyes MD;  Location: [facility] ENDOSCOPY;  Service: Gastroenterology;  Laterality: N/A;  PRE- SCREENING  POST- COLON POLYP    DILATATION AND CURETTAGE      EXTRACORPOREAL SHOCK WAVE LITHOTRIPSY (ESWL) Right 12/17/2021    Procedure: RIGHT EXTRACORPOREAL SHOCKWAVE LITHOTRIPSY AND CYSTOSCOPY;  Surgeon: Curtis Victor MD;  Location: [facility];  Service: Urology;  Laterality: Right;    FEMUR IM NAILING/RODDING Left 11/08/2018    Procedure: FEMUR INTRAMEDULLARY NAILING/RODDING;  Surgeon: Samy Altamirano II, MD;  Location: [facility] OR;  Service: Orthopedics    FRACTURE SURGERY  Nov. 2018    Femur broke due to taking Boniva    HYSTERECTOMY      JOINT REPLACEMENT  2020    Left hip replacement    MASTECTOMY Left 2008    MALIGNANT    TOTAL HIP ARTHROPLASTY Left 01/25/2020    Procedure: left TOTAL HIP arthroplasty, orif femur and removal of hardware;  Surgeon: Samy Altamirano II, MD;  Location: [facility] OR;  Service: Orthopedics    TRAM FLAP REVISION NIPPLE RECONSTRUCTION BREAST REDUCTION Left 2008     Family History   Problem Relation Age of Onset    Hypertension Mother     Arthritis Mother     Hyperlipidemia Mother     Hypertension Father     Heart attack Maternal Grandfather     Breast cancer Neg Hx     Colon cancer Neg Hx     Malig Hyperthermia Neg Hx     reports  that she has never smoked. She has never used smokeless tobacco. She reports that she does not drink alcohol and does not use drugs.    OBJECTIVE    Vital Signs:   There were no vitals taken for this visit.    Physical Exam  Constitutional:       General: She is not in acute distress.     Appearance: Normal appearance.   Pulmonary:      Effort: Pulmonary effort is normal. No respiratory distress.   Neurological:      Mental Status: She is alert. Mental status is at baseline.   Psychiatric:         Mood and Affect: Mood normal.         Behavior: Behavior normal.         Thought Content: Thought content normal.            The following data was reviewed by: Thomas Lino MD on 12/27/2023:    CBC w/diff          10/6/2023    08:07   CBC w/Diff   WBC 7.55    RBC 4.75    Hemoglobin 12.8    Hematocrit 39.2    MCV 82.5    MCH 26.9    MCHC 32.7    RDW 13.3    Platelets 291    Neutrophil Rel % 55.9    Lymphocyte Rel % 29.5    Monocyte Rel % 8.9    Eosinophil Rel % 4.6    Basophil Rel % 0.8      Lipid Panel          10/6/2023    08:07   Lipid Panel   Total Cholesterol 127    Triglycerides 110    HDL Cholesterol 50    VLDL Cholesterol 20    LDL Cholesterol  57    LDL/HDL Ratio 1.10                    ASSESSMENT & PLAN     Diagnoses and all orders for this visit:    1. Essential hypertension (Primary)  Assessment & Plan:  Hypertension is improving with treatment. Still reading greater than goal.   Medication changes per orders. Patient was asked to check their BP 3-5 times weekly at various times of day after being seated for 5 minutes or longer. Discussed that goal blood pressure is less than 130 systolic and less than 80 diastolic. Asked patient to bring log to next visit.     Blood pressure will be reassessed in 2 weeks.    Orders:  -     amLODIPine (NORVASC) 10 MG tablet; Take 1 tablet by mouth Daily.  Dispense: 90 tablet; Refill: 0        Health Maintenance Due   Topic Date Due    COVID-19 Vaccine (4 - 2023-24 season)  09/01/2023        Follow Up  No follow-ups on file.    Patient/family had no further questions at this time and verbalized understanding of the plan discussed today.

## 2023-12-27 NOTE — ASSESSMENT & PLAN NOTE
Hypertension is improving with treatment. Still reading greater than goal.   Medication changes per orders. Patient was asked to check their BP 3-5 times weekly at various times of day after being seated for 5 minutes or longer. Discussed that goal blood pressure is less than 130 systolic and less than 80 diastolic. Asked patient to bring log to next visit.     Blood pressure will be reassessed in 2 weeks.

## 2024-01-02 DIAGNOSIS — F41.9 ANXIETY: ICD-10-CM

## 2024-01-02 RX ORDER — BUSPIRONE HYDROCHLORIDE 10 MG/1
10 TABLET ORAL 3 TIMES DAILY
Qty: 270 TABLET | Refills: 1 | Status: SHIPPED | OUTPATIENT
Start: 2024-01-02

## 2024-01-11 RX ORDER — METOPROLOL SUCCINATE 100 MG/1
100 TABLET, EXTENDED RELEASE ORAL DAILY
Qty: 90 TABLET | Refills: 3 | OUTPATIENT
Start: 2024-01-11

## 2024-01-12 ENCOUNTER — OFFICE VISIT (OUTPATIENT)
Dept: INTERNAL MEDICINE | Facility: CLINIC | Age: 64
End: 2024-01-12
Payer: COMMERCIAL

## 2024-01-12 VITALS
SYSTOLIC BLOOD PRESSURE: 142 MMHG | WEIGHT: 124 LBS | OXYGEN SATURATION: 98 % | HEIGHT: 64 IN | HEART RATE: 77 BPM | BODY MASS INDEX: 21.17 KG/M2 | DIASTOLIC BLOOD PRESSURE: 78 MMHG

## 2024-01-12 DIAGNOSIS — I10 ESSENTIAL HYPERTENSION: Chronic | ICD-10-CM

## 2024-01-12 PROCEDURE — 99213 OFFICE O/P EST LOW 20 MIN: CPT | Performed by: STUDENT IN AN ORGANIZED HEALTH CARE EDUCATION/TRAINING PROGRAM

## 2024-01-12 RX ORDER — AMLODIPINE BESYLATE 10 MG/1
10 TABLET ORAL DAILY
Qty: 90 TABLET | Refills: 0 | Status: SHIPPED | OUTPATIENT
Start: 2024-01-12

## 2024-01-12 NOTE — PROGRESS NOTES
"Answers submitted by the patient for this visit:  Primary Reason for Visit (Submitted on 1/12/2024)  What is the primary reason for your visit?: High Blood Pressure  High Blood Pressure Questionnaire (Submitted on 1/12/2024)  Chief Complaint: Hypertension  Chronicity: recurrent  Agents associated with hypertension: no associated agents  Compliance problems: no compliance problems    Thomas Lino M.D.  Internal Medicine  [address], [address]  [place] [address]  [phone]      Chief Complaint  Hypertension    SUBJECTIVE    History of Present Illness    Bonnie Syed is a 63 y.o. female with RAwho presents to the office today as an established patient that last saw me on 11/29/2023.     Hypertension-on amlodipine 10 mg.  This was increased at her recent appointment. Anxious today.     -133/72-88. BP under good control. Denies side effects. Gets racing heart beat when BP high. She Notes BP is high doctors at [facility] office.     Takes Buspar as needed     Review of Systems    Allergies   Allergen Reactions    Ibandronic Acid Other (See Comments)     FRACTURED LEFT FEMUR  FRACTURED LEFT FEMUR  FRACTURED LEFT FEMUR    Monistat [Miconazole] Other (See Comments)     Pt states she gets red and feels like she is \"BURNING.\"    Sulfa Antibiotics Hives and Swelling    Misc. Sulfonamide Containing Compounds Rash        Outpatient Medications Marked as Taking for the 1/12/24 encounter (Office Visit) with Thomas Lino MD   Medication Sig Dispense Refill    Biotin 5000 MCG capsule Take 10,000 mcg by mouth Daily.      busPIRone (BUSPAR) 10 MG tablet Take 1 tablet by mouth 3 (Three) Times a Day. 270 tablet 1    calcium carbonate-vitamin d (CALTRATE 600+D) 600-400 MG-UNIT per tablet One tab po BID for osteopenia (Patient taking differently: Take 3 tablets by mouth 2 (Two) Times a Day. One tab po BID for osteopenia  2 TABS IN AM  1 TAB IN PM)      inFLIXimab (REMICADE IV) Infuse  into a " venous catheter. Once a month for arthritis      levocetirizine (XYZAL) 5 MG tablet Take 1 tablet by mouth Daily.      montelukast (SINGULAIR) 10 MG tablet Take 1 tablet by mouth Daily.      Multiple Vitamin (MULTIVITAMIN) capsule Take 1 capsule by mouth Daily. HOLD PRIOR TO SURGERY      nitrofurantoin (MACRODANTIN) 100 MG capsule       pantoprazole (PROTONIX) 20 MG EC tablet TAKE 1 TABLET BY MOUTH EVERY DAY 90 tablet 1    rosuvastatin (Crestor) 10 MG tablet Take 1 tablet by mouth Every Night. 90 tablet 3    [DISCONTINUED] amLODIPine (NORVASC) 10 MG tablet Take 1 tablet by mouth Daily. 90 tablet 0        Past Medical History:   Diagnosis Date    Allergic     takes 2 allergy injections weekly    Bladder stone     Breast cancer     left-- had a mastectomy    Cancer     Chronic UTI     Fracture, femur 11/2018    GERD (gastroesophageal reflux disease)     Headache     Sinus headaches, migraines about once a year    History of migraine     Hypertension     Osteopenia     Reflux gastritis     Renal stone     RIGHT DEC 2021    Rheumatoid arthritis     Seborrheic dermatitis     SCLAP AREA-DRY ITCHY SKIN    Unsteady gait     LEFT HIP    Visual impairment     Reading Glasses while on PC    Weakness     LEFT HIP     Past Surgical History:   Procedure Laterality Date    BREAST SURGERY      MALIGNANT    BUNIONECTOMY Left     COLONOSCOPY      COLONOSCOPY N/A 07/18/2022    Procedure: COLONOSCOPY TO CECUM WITH COLD SNARE POLYPECTOMY;  Surgeon: Jamie Reyes MD;  Location: [facility] ENDOSCOPY;  Service: Gastroenterology;  Laterality: N/A;  PRE- SCREENING  POST- COLON POLYP    DILATATION AND CURETTAGE      EXTRACORPOREAL SHOCK WAVE LITHOTRIPSY (ESWL) Right 12/17/2021    Procedure: RIGHT EXTRACORPOREAL SHOCKWAVE LITHOTRIPSY AND CYSTOSCOPY;  Surgeon: Curtis Victor MD;  [place] [facility] [place] [facility]  Service: Urology;  Laterality: Right;    FEMUR IM NAILING/RODDING Left 11/08/2018    Procedure: FEMUR INTRAMEDULLARY  "NAILING/RODDING;  Surgeon: Samy Altamirano II, MD;  Location: [facility];  Service: Orthopedics    FRACTURE SURGERY  Nov. 2018    Femur broke due to taking Boniva    HYSTERECTOMY      JOINT REPLACEMENT  2020    Left hip replacement    MASTECTOMY Left 2008    MALIGNANT    TOTAL HIP ARTHROPLASTY Left 01/25/2020    Procedure: left TOTAL HIP arthroplasty, orif femur and removal of hardware;  Surgeon: Samy Altamirano II, MD;  Location: [facility];  Service: Orthopedics    TRAM FLAP REVISION NIPPLE RECONSTRUCTION BREAST REDUCTION Left 2008     Family History   Problem Relation Age of Onset    Hypertension Mother     Arthritis Mother     Hyperlipidemia Mother     Hypertension Father     Heart attack Maternal Grandfather     Breast cancer Neg Hx     Colon cancer Neg Hx     Malig Hyperthermia Neg Hx     reports that she has never smoked. She has never used smokeless tobacco. She reports that she does not drink alcohol and does not use drugs.    OBJECTIVE    Vital Signs:   /78   Pulse 77   Ht 162.6 cm (64.02\")   Wt 56.2 kg (124 lb)   SpO2 98%   BMI 21.27 kg/m²     Physical Exam  Constitutional:       Appearance: Normal appearance.   Cardiovascular:      Rate and Rhythm: Normal rate and regular rhythm.      Heart sounds: Normal heart sounds. No murmur heard.  Pulmonary:      Effort: Pulmonary effort is normal.      Breath sounds: Normal breath sounds.   Abdominal:      General: Abdomen is flat. There is no distension.      Palpations: Abdomen is soft.      Tenderness: There is no abdominal tenderness.   Skin:     General: Skin is warm and dry.   Neurological:      Mental Status: She is alert.   Psychiatric:         Mood and Affect: Mood normal.         Behavior: Behavior normal.         Thought Content: Thought content normal.            The following data was reviewed by: Thomas Lino MD on 01/12/2024:    CBC w/diff          10/6/2023    08:07   CBC w/Diff   WBC 7.55    RBC 4.75  "   Hemoglobin 12.8    Hematocrit 39.2    MCV 82.5    MCH 26.9    MCHC 32.7    RDW 13.3    Platelets 291    Neutrophil Rel % 55.9    Lymphocyte Rel % 29.5    Monocyte Rel % 8.9    Eosinophil Rel % 4.6    Basophil Rel % 0.8      Lipid Panel          10/6/2023    08:07   Lipid Panel   Total Cholesterol 127    Triglycerides 110    HDL Cholesterol 50    VLDL Cholesterol 20    LDL Cholesterol  57    LDL/HDL Ratio 1.10                ASSESSMENT & PLAN     Diagnoses and all orders for this visit:    1. Essential hypertension    BP controlled at home. Slightly elevated in office due to [place] HTN. Continue current management.    Health Maintenance Due   Topic Date Due    COVID-19 Vaccine (4 - 2023-24 season) 09/01/2023        Follow Up  No follow-ups on file.    Patient/family had no further questions at this time and verbalized understanding of the plan discussed today.

## 2024-01-25 ENCOUNTER — TELEPHONE (OUTPATIENT)
Dept: INTERNAL MEDICINE | Facility: CLINIC | Age: 64
End: 2024-01-25
Payer: COMMERCIAL

## 2024-01-25 RX ORDER — VALSARTAN 40 MG/1
40 TABLET ORAL DAILY
Qty: 90 TABLET | Refills: 2 | Status: SHIPPED | OUTPATIENT
Start: 2024-01-25

## 2024-01-25 NOTE — TELEPHONE ENCOUNTER
I called Bonnie Syed at [phone] at 17:25 EST     Feet are very swollen and bothersome.  She did not have this side effect with 5 mg of amlodipine however this was not effective in blood pressure control.  Will stop amlodipine.  She is hesitant to start valsartan because she is worried about lab monitoring.  She already has frequent lab monitoring since she is on Remicade so I do not think this would cause additional issues for her.  Medication sent.  I encouraged her to call if she has any issues.

## 2024-01-25 NOTE — TELEPHONE ENCOUNTER
Caller: Bonnie Syed    Relationship: Self    Best call back number: [phone]     Which medication are you concerned about: AMLODIPINE    Who prescribed you this medication: DR KAPADIA    When did you start taking this medication: 4-6 WEEKS    What are your concerns: PATIENT HAS NOTICED THAT HER FEET AND ANKLES ARE VERY SWOLLEN. SHE NOTICED ABOUT THREE DAYS AGO BUT TODAY IT IS WORSE. DR KAPADIA TOLD HER THIS COULD BE A SIDE AFFECT OF THIS MEDICATION. PLEASE ADVISE ON NEXT STEPS.

## 2024-01-26 ENCOUNTER — TELEPHONE (OUTPATIENT)
Dept: INTERNAL MEDICINE | Facility: CLINIC | Age: 64
End: 2024-01-26

## 2024-01-26 NOTE — TELEPHONE ENCOUNTER
PATIENT IS CALLING BACK STATING THAT SHE REALLY WANTS A CALLBACK ABOUT BLOOD PRESSURE MEDICATION AND WOULD LIKE A CALLBACK BEFORE THE OFFICE CLOSES.    PATIENT STATES SHE NEEDS SOMETHING OTHER THAN VALSARTAN AS THAT IS NOT GOOD FOR HER KIDNEYS.    SHE STATES SHE IS ALREADY ON A MEDICATION THAT SHE HAS TO HAVE HER KIDNEY FUNCTION MONITORED SO SHE DOES NOT WANT TO ADD VALSARTAN TO HER REGIMEN.   PLEASE CALL THE PATIENT TO LET HER KNOW SOMETHING WILL BE CALLED IN.

## 2024-01-26 NOTE — PROGRESS NOTES
"I called Bonnie Syed at [phone] at 16:34 EST     She is worried about ACEi/ARB effecting kidneys.  She is worried about having to do extra labs because she is already having blood work done for Remicade every 3 months.  She would not need additional blood work for ACE/ARB since she is already getting blood work every 3 months.  Discussed other options would be beta-blocker which previously \"stopped working\" for her.  Other medicines such as hydrochlorothiazide can also affect the kidneys.  Hydralazine and clonidine are both medications that have to be taken more than once a day and can cause rebound hypertension.  After considering this she would like to start valsartan.  "

## 2024-01-26 NOTE — TELEPHONE ENCOUNTER
Caller: Bonnie Syed    Relationship: Self    Best call back number:     [phone]       Which medication are you concerned about: BLOOD PRESSURE MEDICATION      CONCERNS:    PATIENT IS WANTING A DIFFERENT BLOOD PRESSURE MEDICATION OTHER THAN THE ONE YOU PRESCRIBED TO HER DUE TO HER KIDNEYS.    IN ADDITION SHE IS CURRENTLY NOT TAKING ANY BLOOD PRESSURE MEDICATION AND WANTS TO GET THIS RESOLVED BEFORE THE WEEKEND.    SHE SENT A MESSAGE TO YOU FROM HER [employer] ACCOUNT.

## 2024-02-09 DIAGNOSIS — K21.00 GASTROESOPHAGEAL REFLUX DISEASE WITH ESOPHAGITIS, UNSPECIFIED WHETHER HEMORRHAGE: ICD-10-CM

## 2024-02-09 RX ORDER — PANTOPRAZOLE SODIUM 20 MG/1
20 TABLET, DELAYED RELEASE ORAL DAILY
Qty: 90 TABLET | Refills: 2 | Status: SHIPPED | OUTPATIENT
Start: 2024-02-09

## 2024-02-09 NOTE — TELEPHONE ENCOUNTER
Caller: Bonnie Syed ROSIO    Relationship: Self    Best call back number:     [phone]        Requested Prescriptions:   Requested Prescriptions     Pending Prescriptions Disp Refills    pantoprazole (PROTONIX) 20 MG EC tablet 90 tablet 1     Sig: Take 1 tablet by mouth Daily.        Pharmacy where request should be sent:  [facility]/pharmacy #[id number] [place], [place] - [address] AT [facility] [phone]  - [phone]      Last office visit with prescribing clinician: 1/12/2024   Last telemedicine visit with prescribing clinician: 12/27/2023   Next office visit with prescribing clinician: 6/20/2024     Additional details provided by patient: OUT    Does the patient have less than a 3 day supply:  [x] Yes  [] No    Would you like a call back once the refill request has been completed: [] Yes [] No    If the office needs to give you a call back, can they leave a voicemail: [] Yes [] No    Jeremi Colon   02/09/24 10:18 EST

## 2024-04-10 DIAGNOSIS — I10 ESSENTIAL HYPERTENSION: Chronic | ICD-10-CM

## 2024-04-14 RX ORDER — AMLODIPINE BESYLATE 10 MG/1
10 TABLET ORAL DAILY
Qty: 90 TABLET | Refills: 0 | Status: SHIPPED | OUTPATIENT
Start: 2024-04-14

## 2024-04-22 RX ORDER — VALSARTAN 40 MG/1
40 TABLET ORAL DAILY
Qty: 90 TABLET | Refills: 2 | Status: SHIPPED | OUTPATIENT
Start: 2024-04-22

## 2024-06-14 DIAGNOSIS — E78.00 HIGH CHOLESTEROL: ICD-10-CM

## 2024-06-14 RX ORDER — ROSUVASTATIN CALCIUM 10 MG/1
10 TABLET, COATED ORAL
Qty: 90 TABLET | Refills: 2 | Status: SHIPPED | OUTPATIENT
Start: 2024-06-14

## 2024-06-20 ENCOUNTER — OFFICE VISIT (OUTPATIENT)
Dept: INTERNAL MEDICINE | Facility: CLINIC | Age: 64
End: 2024-06-20
Payer: COMMERCIAL

## 2024-06-20 VITALS
HEART RATE: 90 BPM | SYSTOLIC BLOOD PRESSURE: 123 MMHG | OXYGEN SATURATION: 96 % | HEIGHT: 64 IN | DIASTOLIC BLOOD PRESSURE: 78 MMHG | BODY MASS INDEX: 21.41 KG/M2 | WEIGHT: 125.4 LBS

## 2024-06-20 DIAGNOSIS — E78.2 MIXED HYPERLIPIDEMIA: ICD-10-CM

## 2024-06-20 DIAGNOSIS — I10 ESSENTIAL HYPERTENSION: Primary | Chronic | ICD-10-CM

## 2024-06-20 DIAGNOSIS — M25.551 RIGHT HIP PAIN: ICD-10-CM

## 2024-06-20 PROCEDURE — 99214 OFFICE O/P EST MOD 30 MIN: CPT | Performed by: STUDENT IN AN ORGANIZED HEALTH CARE EDUCATION/TRAINING PROGRAM

## 2024-06-20 NOTE — PROGRESS NOTES
"Answers submitted by the patient for this visit:  Other (Submitted on 6/13/2024)  Please describe your symptoms.: Check up  Have you had these symptoms before?: No  How long have you been having these symptoms?: Greater than 2 weeks  Please list any medications you are currently taking for this condition.: Check up visit  Primary Reason for Visit (Submitted on 6/13/2024)  What is the primary reason for your visit?: Other  Answers submitted by the patient for this visit:  Primary Reason for Visit (Submitted on 1/12/2024)  What is the primary reason for your visit?: High Blood Pressure  High Blood Pressure Questionnaire (Submitted on 1/12/2024)  Chief Complaint: Hypertension  Chronicity: recurrent  Agents associated with hypertension: no associated agents  Compliance problems: no compliance problems    Thomas Lino M.D.  Internal Medicine  [address], [address]  [place] [address]  [phone]      Chief Complaint  Hypertension (6 mth F/U /)    SUBJECTIVE    Hypertension        Bonnie Syed is a 63 y.o. female with RAwho presents to the office today as an established patient that last saw me on 11/29/2023.     Hypertension-on valsartan since January. BP controlled in office. Checks when she is anxious at home.   HTN worsened by stress.     History of femur fracture on the left. Was on Boniva for prolonged period    Takes Buspar as needed     Review of Systems    Allergies   Allergen Reactions    Ibandronic Acid Other (See Comments)     FRACTURED LEFT FEMUR  FRACTURED LEFT FEMUR  FRACTURED LEFT FEMUR    Monistat [Miconazole] Other (See Comments)     Pt states she gets red and feels like she is \"BURNING.\"    Sulfa Antibiotics Hives and Swelling    Misc. Sulfonamide Containing Compounds Rash        Outpatient Medications Marked as Taking for the 6/20/24 encounter (Office Visit) with Thomas Lino MD   Medication Sig Dispense Refill    Biotin 5000 MCG capsule Take 10,000 mcg by mouth " Daily.      busPIRone (BUSPAR) 10 MG tablet Take 1 tablet by mouth 3 (Three) Times a Day. 270 tablet 1    calcium carbonate-vitamin d (CALTRATE 600+D) 600-400 MG-UNIT per tablet One tab po BID for osteopenia (Patient taking differently: Take 3 tablets by mouth 2 (Two) Times a Day. One tab po BID for osteopenia  2 TABS IN AM  1 TAB IN PM)      inFLIXimab (REMICADE IV) Infuse  into a venous catheter. Once a month for arthritis      levocetirizine (XYZAL) 5 MG tablet Take 1 tablet by mouth Daily.      montelukast (SINGULAIR) 10 MG tablet Take 1 tablet by mouth Daily.      Multiple Vitamin (MULTIVITAMIN) capsule Take 1 capsule by mouth Daily. HOLD PRIOR TO SURGERY      nitrofurantoin (MACRODANTIN) 100 MG capsule       pantoprazole (PROTONIX) 20 MG EC tablet Take 1 tablet by mouth Daily. 90 tablet 2    rosuvastatin (CRESTOR) 10 MG tablet TAKE 1 TABLET BY MOUTH EVERY DAY AT NIGHT 90 tablet 2    valsartan (Diovan) 40 MG tablet Take 1 tablet by mouth Daily. 90 tablet 2    [DISCONTINUED] amLODIPine (NORVASC) 10 MG tablet TAKE 1 TABLET BY MOUTH EVERY DAY 90 tablet 0        Past Medical History:   Diagnosis Date    Allergic     takes 2 allergy injections weekly    Bladder stone     Breast cancer     left-- had a mastectomy    Cancer     Chronic UTI     Fracture, femur 11/2018    GERD (gastroesophageal reflux disease)     Headache     Sinus headaches, migraines about once a year    History of migraine     Hypertension     Osteopenia     Reflux gastritis     Renal stone     RIGHT DEC 2021    Rheumatoid arthritis     Seborrheic dermatitis     SCLAP AREA-DRY ITCHY SKIN    Unsteady gait     LEFT HIP    Visual impairment     Reading Glasses while on PC    Weakness     LEFT HIP     Past Surgical History:   Procedure Laterality Date    BREAST SURGERY      MALIGNANT    BUNIONECTOMY Left     COLONOSCOPY      COLONOSCOPY N/A 07/18/2022    Procedure: COLONOSCOPY TO CECUM WITH COLD SNARE POLYPECTOMY;  Surgeon: Jamie Reyes MD;   "Location: [facility] ENDOSCOPY;  Service: Gastroenterology;  Laterality: N/A;  PRE- SCREENING  POST- COLON POLYP    DILATATION AND CURETTAGE      EXTRACORPOREAL SHOCK WAVE LITHOTRIPSY (ESWL) Right 12/17/2021    Procedure: RIGHT EXTRACORPOREAL SHOCKWAVE LITHOTRIPSY AND CYSTOSCOPY;  Surgeon: Curtis Victor MD;  Location: [facility] OR OSC;  Service: Urology;  Laterality: Right;    FEMUR IM NAILING/RODDING Left 11/08/2018    Procedure: FEMUR INTRAMEDULLARY NAILING/RODDING;  Surgeon: Samy Altamirano II, MD;  Location: [facility] OR;  Service: Orthopedics    FRACTURE SURGERY  Nov. 2018    Femur broke due to taking Boniva    HYSTERECTOMY      JOINT REPLACEMENT  2020    Left hip replacement    MASTECTOMY Left 2008    MALIGNANT    TOTAL HIP ARTHROPLASTY Left 01/25/2020    Procedure: left TOTAL HIP arthroplasty, orif femur and removal of hardware;  Surgeon: Samy Altamirano II, MD;  Location: [facility] OR;  Service: Orthopedics    TRAM FLAP REVISION NIPPLE RECONSTRUCTION BREAST REDUCTION Left 2008     Family History   Problem Relation Age of Onset    Hypertension Mother     Arthritis Mother     Hyperlipidemia Mother     Hypertension Father     Heart attack Maternal Grandfather     Breast cancer Neg Hx     Colon cancer Neg Hx     Malig Hyperthermia Neg Hx     reports that she has never smoked. She has never used smokeless tobacco. She reports that she does not drink alcohol and does not use drugs.    OBJECTIVE    Vital Signs:   /78   Pulse 90   Ht 162.6 cm (64.02\")   Wt 56.9 kg (125 lb 6.4 oz)   SpO2 96%   BMI 21.51 kg/m²     Physical Exam  Constitutional:       Appearance: Normal appearance.   Cardiovascular:      Rate and Rhythm: Normal rate and regular rhythm.      Heart sounds: Normal heart sounds. No murmur heard.  Pulmonary:      Effort: Pulmonary effort is normal.      Breath sounds: Normal breath sounds.   Skin:     General: Skin is warm and dry.   Neurological:      Mental Status: She is " alert.   Psychiatric:         Mood and Affect: Mood normal.         Behavior: Behavior normal.         Thought Content: Thought content normal.            The following data was reviewed by: Thomas Lino MD on 01/12/2024:    CBC w/diff          10/6/2023    08:07   CBC w/Diff   WBC 7.55    RBC 4.75    Hemoglobin 12.8    Hematocrit 39.2    MCV 82.5    MCH 26.9    MCHC 32.7    RDW 13.3    Platelets 291    Neutrophil Rel % 55.9    Lymphocyte Rel % 29.5    Monocyte Rel % 8.9    Eosinophil Rel % 4.6    Basophil Rel % 0.8      Lipid Panel          10/6/2023    08:07   Lipid Panel   Total Cholesterol 127    Triglycerides 110    HDL Cholesterol 50    VLDL Cholesterol 20    LDL Cholesterol  57    LDL/HDL Ratio 1.10          LABORATORY - SCAN - CBC/CMP/CRP, ARNEL RHEUM, 04/04/2024 (04/04/2024)       ASSESSMENT & PLAN     Diagnoses and all orders for this visit:    1. Essential hypertension (Primary)  Assessment & Plan:  Hypertension is stable and controlled  Continue current treatment regimen.  Blood pressure will be reassessed in 6 months.      2. Right hip pain  -     Ambulatory Referral to Orthopedic Surgery  -     Lipid Panel; Future  -     Comprehensive Metabolic Panel; Future  -     CBC & Differential; Future    3. Mixed hyperlipidemia  Assessment & Plan:  Cholesterol well-controlled on Crestor however patient is wondering if she can decrease dose.  We will try decreasing dosing to every other day per patient preference.  Recheck cholesterol in 3 months.            Health Maintenance Due   Topic Date Due    RSV Vaccine - Adults (1 - 1-dose 60+ series) Never done    COVID-19 Vaccine (4 - 2023-24 season) 09/01/2023        Follow Up  Return in about 6 months (around 12/20/2024) for Annual physical.    Patient/family had no further questions at this time and verbalized understanding of the plan discussed today.

## 2024-06-20 NOTE — ASSESSMENT & PLAN NOTE
Cholesterol well-controlled on Crestor however patient is wondering if she can decrease dose.  We will try decreasing dosing to every other day per patient preference.  Recheck cholesterol in 3 months.

## 2024-08-27 ENCOUNTER — OFFICE VISIT (OUTPATIENT)
Dept: ORTHOPEDIC SURGERY | Facility: CLINIC | Age: 64
End: 2024-08-27
Payer: COMMERCIAL

## 2024-08-27 VITALS — TEMPERATURE: 97.7 F | HEIGHT: 64 IN | BODY MASS INDEX: 21.61 KG/M2 | WEIGHT: 126.6 LBS

## 2024-08-27 DIAGNOSIS — R52 PAIN: Primary | ICD-10-CM

## 2024-08-27 PROCEDURE — 99204 OFFICE O/P NEW MOD 45 MIN: CPT | Performed by: ORTHOPAEDIC SURGERY

## 2024-08-27 NOTE — PROGRESS NOTES
"Patient: Bonnie Syed  YOB: 1960 63 y.o. female  Medical Record Number: [id number]    Chief Complaints:   Chief Complaint   Patient presents with    Right Hip - Initial Evaluation, Pain       History of Present Illness:Bonnie Syed is a 63 y.o. female who presents with complaints of bilateral hip issues.  She had a very complicated history with her left hip.  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  An intramedullary nail was placed after the fracture completed.  This was done by Dr. Jerald Altamirano.  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  Unfortunately she has had persistent muscle weakness and is now using a cane.  She is also complaining of some right lateral hip pain now over the last few months.    Allergies:   Allergies   Allergen Reactions    Ibandronic Acid Other (See Comments)     FRACTURED LEFT FEMUR  FRACTURED LEFT FEMUR  FRACTURED LEFT FEMUR    Monistat [Miconazole] Other (See Comments)     Pt states she gets red and feels like she is \"BURNING.\"    Sulfa Antibiotics Hives and Swelling    Misc. Sulfonamide Containing Compounds Rash       Medications:   Current Outpatient Medications   Medication Sig Dispense Refill    Biotin 5000 MCG capsule Take 10,000 mcg by mouth Daily.      busPIRone (BUSPAR) 10 MG tablet Take 1 tablet by mouth 3 (Three) Times a Day. 270 tablet 1    calcium carbonate-vitamin d (CALTRATE 600+D) 600-400 MG-UNIT per tablet One tab po BID for osteopenia (Patient taking differently: Take 3 tablets by mouth 2 (Two) Times a Day. One tab po BID for osteopenia  2 TABS IN AM  1 TAB IN PM)      inFLIXimab (REMICADE IV) Infuse  into a venous catheter. Once a month for arthritis      levocetirizine (XYZAL) 5 MG tablet Take 1 tablet by mouth Daily.      montelukast (SINGULAIR) 10 MG tablet Take 1 tablet by mouth Daily.      Multiple Vitamin (MULTIVITAMIN) " "capsule Take 1 capsule by mouth Daily. HOLD PRIOR TO SURGERY      pantoprazole (PROTONIX) 20 MG EC tablet Take 1 tablet by mouth Daily. 90 tablet 2    rosuvastatin (CRESTOR) 10 MG tablet TAKE 1 TABLET BY MOUTH EVERY DAY AT NIGHT 90 tablet 2    valsartan (Diovan) 40 MG tablet Take 1 tablet by mouth Daily. 90 tablet 2    nitrofurantoin (MACRODANTIN) 100 MG capsule        No current facility-administered medications for this visit.         The following portions of the patient's history were reviewed and updated as appropriate: allergies, current medications, past family history, past medical history, past social history, past surgical history and problem list.    Review of Systems:   Pertinent positives/negative listed in HPI above    Physical Exam:   Vitals:    08/27/24 1554   Temp: 97.7 °F (36.5 °C)   TempSrc: Temporal   Weight: 57.4 kg (126 lb 9.6 oz)   Height: 162.6 cm (64\")   PainSc: 0-No pain   PainLoc: Hip       General: A and O x 3, ASA, NAD   Long anterior incision left hip healed no sign of infection overall fairly good range of motion the left side but she has considerable hip abductor weakness of the left hip and walks with a marked Trendelenburg gait.  The right hip shows mild tenderness to palpation about the trochanter negative logroll negative Stinchfield          Radiology:  Xrays 2views right hip (AP bilateral hips, and lateral of the hip) ordered and reviewed for evaluation of hip pain  demonstrating no significant arthritic changes there is no obvious stress fracture although there may be some increased callus in the subtrochanteric region.  The AP view demonstrates left hip revision long rehab stem trochanteric claw plate multiple cables    Assessment/Plan: Left hip multiply revised following a pathologic stress fracture.  She had a very complicated multi surgery reconstruction by Dr. Jerald Altamirano and is left with extensive abductor weakness I want her to work on abductor strengthening    Right hip " discomfort it could be bursitis but given her history 1 to make sure there is no evidence of stress reaction in the subtrochanteric region.  I am going to get a bone scan we will call her with results.  If it shows no sign of stress fracture we could consider treatment for hip bursitis including an injection and therapy.      Diagnoses and all orders for this visit:    1. Pain (Primary)  -     XR Hip With or Without Pelvis 2 - 3 View Right  -     NM Bone Scan Whole Body; Future        Harlan Riley MD  8/27/2024

## 2024-09-03 ENCOUNTER — TELEPHONE (OUTPATIENT)
Dept: ORTHOPEDIC SURGERY | Facility: CLINIC | Age: 64
End: 2024-09-03

## 2024-09-03 NOTE — TELEPHONE ENCOUNTER
Caller: LUX     Relationship to patient: SELF    Best call back number: [phone] (home)       Type of visit: FOLLOW UP     Additional notes: PATIENTS BONE SCAN IS ON 09/23/24 ONLY WANTS TO SEE DR ELAINE FOR FOLLOW UP BUT FIRST AVAILABLE NOT UNTIL 10/17/24

## 2024-09-24 ENCOUNTER — OFFICE VISIT (OUTPATIENT)
Dept: INTERNAL MEDICINE | Facility: CLINIC | Age: 64
End: 2024-09-24
Payer: COMMERCIAL

## 2024-09-24 VITALS
HEIGHT: 64 IN | DIASTOLIC BLOOD PRESSURE: 80 MMHG | BODY MASS INDEX: 21.72 KG/M2 | SYSTOLIC BLOOD PRESSURE: 154 MMHG | OXYGEN SATURATION: 97 % | WEIGHT: 127.2 LBS | HEART RATE: 84 BPM

## 2024-09-24 DIAGNOSIS — R10.2 PELVIC PAIN: Primary | ICD-10-CM

## 2024-09-24 PROCEDURE — 99213 OFFICE O/P EST LOW 20 MIN: CPT | Performed by: STUDENT IN AN ORGANIZED HEALTH CARE EDUCATION/TRAINING PROGRAM

## 2024-10-03 ENCOUNTER — HOSPITAL ENCOUNTER (OUTPATIENT)
Dept: NUCLEAR MEDICINE | Facility: HOSPITAL | Age: 64
Discharge: HOME | End: 2024-10-03
Payer: COMMERCIAL

## 2024-10-03 DIAGNOSIS — R52 PAIN: ICD-10-CM

## 2024-10-03 PROCEDURE — 0 TECHNETIUM MEDRONATE KIT: Performed by: ORTHOPAEDIC SURGERY

## 2024-10-03 PROCEDURE — A9503 TC99M MEDRONATE: HCPCS | Performed by: ORTHOPAEDIC SURGERY

## 2024-10-03 PROCEDURE — 78306 BONE IMAGING WHOLE BODY: CPT

## 2024-10-03 RX ORDER — TC 99M MEDRONATE 20 MG/10ML
21.1 INJECTION, POWDER, LYOPHILIZED, FOR SOLUTION INTRAVENOUS
Status: COMPLETED | OUTPATIENT
Start: 2024-10-03 | End: 2024-10-03

## 2024-10-03 RX ADMIN — Medication 21.1 MILLICURIE: at 10:55

## 2024-10-22 ENCOUNTER — OFFICE VISIT (OUTPATIENT)
Dept: ORTHOPEDIC SURGERY | Facility: CLINIC | Age: 64
End: 2024-10-22
Payer: COMMERCIAL

## 2024-10-22 VITALS — TEMPERATURE: 98.2 F | WEIGHT: 126 LBS | HEIGHT: 64 IN | BODY MASS INDEX: 21.51 KG/M2

## 2024-10-22 DIAGNOSIS — M70.61 TROCHANTERIC BURSITIS OF RIGHT HIP: Primary | ICD-10-CM

## 2024-10-22 PROCEDURE — 99212 OFFICE O/P EST SF 10 MIN: CPT | Performed by: ORTHOPAEDIC SURGERY

## 2024-10-22 RX ORDER — CLOBETASOL PROPIONATE 0.5 MG/G
OINTMENT TOPICAL
COMMUNITY
Start: 2024-08-01

## 2024-10-22 RX ORDER — ESTRADIOL 0.1 MG/G
1 CREAM VAGINAL
COMMUNITY
Start: 2024-08-15 | End: 2025-08-15

## 2024-10-22 NOTE — PROGRESS NOTES
"Patient: Bonnie Syed  YOB: 1960 64 y.o. female  Medical Record Number: [id number]    Chief Complaint:   Chief Complaint   Patient presents with    Right Hip - Follow-up       History of Present Illness:Bonnie Syed is a 64 y.o. female who presents for follow-up of right hip she had bursitis.  We wanted to make sure there is not a stress fracture like she had on the left side before considering any steroid injections.  The bone scan does not show stress fracture in fact her right hip symptoms are only moderate currently    Allergies:   Allergies   Allergen Reactions    Ibandronic Acid Other (See Comments)     FRACTURED LEFT FEMUR    FRACTURED LEFT FEMUR   FRACTURED LEFT FEMUR      FRACTURED LEFT FEMUR    Monistat [Miconazole] Other (See Comments)     Pt states she gets red and feels like she is \"BURNING.\"    Sulfa Antibiotics Hives and Swelling    Misc. Sulfonamide Containing Compounds Rash       Medications:   Current Outpatient Medications   Medication Sig Dispense Refill    Biotin 5000 MCG capsule Take 10,000 mcg by mouth Daily.      busPIRone (BUSPAR) 10 MG tablet Take 1 tablet by mouth 3 (Three) Times a Day. 270 tablet 1    calcium carbonate-vitamin d (CALTRATE 600+D) 600-400 MG-UNIT per tablet One tab po BID for osteopenia (Patient taking differently: Take 3 tablets by mouth 2 (Two) Times a Day. One tab po BID for osteopenia  2 TABS IN AM  1 TAB IN PM)      clobetasol (TEMOVATE) 0.05 % ointment Apply  topically to the appropriate area as directed.      estradiol (ESTRACE) 0.1 MG/GM vaginal cream Insert 1 g into the vagina.      inFLIXimab (REMICADE IV) Infuse  into a venous catheter. Once a month for arthritis      levocetirizine (XYZAL) 5 MG tablet Take 1 tablet by mouth Daily.      montelukast (SINGULAIR) 10 MG tablet Take 1 tablet by mouth Daily.      Multiple Vitamin (MULTIVITAMIN) capsule Take 1 capsule by mouth Daily. HOLD PRIOR TO SURGERY      pantoprazole (PROTONIX) 20 MG EC tablet Take 1 " "tablet by mouth Daily. 90 tablet 2    rosuvastatin (CRESTOR) 10 MG tablet TAKE 1 TABLET BY MOUTH EVERY DAY AT NIGHT 90 tablet 2    valsartan (Diovan) 40 MG tablet Take 1 tablet by mouth Daily. 90 tablet 2    nitrofurantoin (MACRODANTIN) 100 MG capsule  (Patient not taking: Reported on 10/22/2024)       No current facility-administered medications for this visit.         The following portions of the patient's history were reviewed and updated as appropriate: allergies, current medications, past family history, past medical history, past social history, past surgical history and problem list.    Review of Systems:   Pertinent positives/negative listed in HPI above    Physical Exam:   Vitals:    10/22/24 1351   Temp: 98.2 °F (36.8 °C)   TempSrc: Temporal   Weight: 57.2 kg (126 lb)   Height: 162.6 cm (64\")   PainSc: 0-No pain   PainLoc: Hip       General: A and O x 3, ASA, NAD    She walks with a marked Trendelenburg gait on the left side the right hip shows mild tenderness at the bursa no pain or irritability with hip range of motion she walks with a cane      Radiology:    Xrays 2views right hip (AP bilateral hips and lateral hip) taken previously demonstrating minimal arthritic findings  Recent bone scan shows no abnormality about the right hip    Assessment/Plan:  Right hip bursitis actually doing reasonably well there is no sign of stress fracture her bone scan.  I explained that I think her left side is what it is she has had a lot of surgical trauma and I do not anticipate that will improve significantly.  I will see her back as needed if her right hip pain worsens we could consider a cortisone injection in the meantime she work on hip fascia stretching exercises which I demonstrated today.      There are no diagnoses linked to this encounter.    Harlan Riley MD  10/22/2024  "

## 2024-11-08 DIAGNOSIS — K21.00 GASTROESOPHAGEAL REFLUX DISEASE WITH ESOPHAGITIS, UNSPECIFIED WHETHER HEMORRHAGE: ICD-10-CM

## 2024-11-08 RX ORDER — PANTOPRAZOLE SODIUM 20 MG/1
20 TABLET, DELAYED RELEASE ORAL DAILY
Qty: 90 TABLET | Refills: 2 | Status: SHIPPED | OUTPATIENT
Start: 2024-11-08

## 2024-11-20 DIAGNOSIS — F41.9 ANXIETY: ICD-10-CM

## 2024-11-20 RX ORDER — BUSPIRONE HYDROCHLORIDE 10 MG/1
10 TABLET ORAL 3 TIMES DAILY
Qty: 270 TABLET | Refills: 1 | Status: SHIPPED | OUTPATIENT
Start: 2024-11-20

## 2024-12-04 NOTE — TELEPHONE ENCOUNTER
----- Message from Bonnie Syed sent at 6/8/2020 11:47 AM EDT -----  Regarding: RE: Non-Urgent Medical Question  Contact: [phone]  Yes, one of the doctors please.  I prefer as late in the day, if possible    Thank you  Bonnie Syed    ----- Message -----  From: FREDRICK CHIU  Sent: 6/8/20, 11:17 AM  To: Bonnie Syed  Subject: RE: Non-Urgent Medical Question    Good Morning Ms. Syed,    This is Dara from Dr. Abbott's office. He is currently not taking any new patients at this time but if he is agreeable, I have a few providers here in office that are taking new patients. I have two nurse practitioners, Nena Gilman/ Jana Lind and two doctors, Dr. Pena/ Dr. Stark. If any of these are ok with you guys, let me know and I can get you scheduled.     CHEYENNE Diamond     ----- Message -----     From: Bonnie Syed     Sent: 6/7/2020  7:38 PM EDT       To: Pa Abbott MD  Subject: Non-Urgent Medical Question    Dr. Abbott,     My son, Alex Syed (23) has been seen in your office, but that doctor is no longer there.  He has asked to be seen, said he feels tired and no energy (overweight) and has issues sleeping.    He currently is seeing Dr. Best Cooper and is going thru counseling.  He’s not working or going to school because of the ADHD and anxiety he deals with.  He has gone thru testing and is not autistic.  If you can't see him, I understand, but could you recommend someone else?    Also, still having fast heartbeat butTuesday will be one week on metoprolol.  Sure it still needs time to work.      BP reading this weekend:  Sat 131/106  Sat. pm 132/95  Sun.  134/100  Sun pm 125/91 (after taking 1/2 xanax)    Thank you  Bonnie Syed   medications as directed. plenty of fluids. Tylenol as needed.  Follow up with your family doctor the [facility] next week.  Return for any new or worsening symptoms. May use warm saltwater gargles or Chloraseptic spray throat lozenges for sore throat. May use saline rinses or neti pots for congestion  Increase your water and fiber intake.  May use MiraLAX or stool softeners for the next couple days.  Abnormal incidental finding on your CT that primary care will follow

## 2024-12-06 ENCOUNTER — OFFICE VISIT (OUTPATIENT)
Dept: INTERNAL MEDICINE | Facility: CLINIC | Age: 64
End: 2024-12-06
Payer: COMMERCIAL

## 2024-12-06 VITALS
OXYGEN SATURATION: 99 % | WEIGHT: 129.2 LBS | HEIGHT: 64 IN | SYSTOLIC BLOOD PRESSURE: 124 MMHG | DIASTOLIC BLOOD PRESSURE: 70 MMHG | BODY MASS INDEX: 22.06 KG/M2 | HEART RATE: 100 BPM

## 2024-12-06 DIAGNOSIS — E78.2 MIXED HYPERLIPIDEMIA: ICD-10-CM

## 2024-12-06 DIAGNOSIS — Z00.00 ANNUAL PHYSICAL EXAM: Primary | ICD-10-CM

## 2024-12-06 DIAGNOSIS — I10 ESSENTIAL HYPERTENSION: Chronic | ICD-10-CM

## 2024-12-06 NOTE — PROGRESS NOTES
"  Thomas Lino M.D.  Internal Medicine  [facility]  [address], [address]  [place] [address]  [phone]      Chief Complaint  Annual Exam (CPE /)    SUBJECTIVE    History of Present Illness    Bonnie Syed is a 64 y.o. female who presents to the office today as an established patient that last saw me on 9/24/2024.     RA-on remicade. Follows with rheumatology. Worse in the cold.     Anxiety-on buspar as needed. Mood is \"fine\".     Allergies-taking over-the-counter medication    HLD-on crestor    HTN-on valsartan. Well controlled.    Follows with GYN    DEXA per Rheum. Previous femur fracture on Boniva. Now on calcium and vit D.     No formal exercise. Does arm lifs.     Review of Systems    Allergies   Allergen Reactions    Ibandronic Acid Other (See Comments)     FRACTURED LEFT FEMUR    FRACTURED LEFT FEMUR   FRACTURED LEFT FEMUR      FRACTURED LEFT FEMUR    Monistat [Miconazole] Other (See Comments)     Pt states she gets red and feels like she is \"BURNING.\"    Sulfa Antibiotics Hives and Swelling    Misc. Sulfonamide Containing Compounds Rash        Outpatient Medications Marked as Taking for the 12/6/24 encounter (Office Visit) with Thomas Lino MD   Medication Sig Dispense Refill    Biotin 5000 MCG capsule Take 10,000 mcg by mouth Daily.      busPIRone (BUSPAR) 10 MG tablet TAKE 1 TABLET BY MOUTH THREE TIMES A  tablet 1    calcium carbonate-vitamin d (CALTRATE 600+D) 600-400 MG-UNIT per tablet One tab po BID for osteopenia (Patient taking differently: Take 3 tablets by mouth 2 (Two) Times a Day. One tab po BID for osteopenia  2 TABS IN AM  1 TAB IN PM)      clobetasol (TEMOVATE) 0.05 % ointment Apply  topically to the appropriate area as directed.      estradiol (ESTRACE) 0.1 MG/GM vaginal cream Insert 1 g into the vagina.      inFLIXimab (REMICADE IV) Infuse  into a venous catheter. Once a month for arthritis      levocetirizine (XYZAL) 5 MG tablet Take 1 tablet by mouth " Daily.      montelukast (SINGULAIR) 10 MG tablet Take 1 tablet by mouth Daily.      Multiple Vitamin (MULTIVITAMIN) capsule Take 1 capsule by mouth Daily. HOLD PRIOR TO SURGERY      pantoprazole (PROTONIX) 20 MG EC tablet TAKE 1 TABLET BY MOUTH EVERY DAY 90 tablet 2    rosuvastatin (CRESTOR) 10 MG tablet TAKE 1 TABLET BY MOUTH EVERY DAY AT NIGHT 90 tablet 2    valsartan (Diovan) 40 MG tablet Take 1 tablet by mouth Daily. 90 tablet 2        Past Medical History:   Diagnosis Date    Allergic     takes 2 allergy injections weekly    Bladder stone     Breast cancer     left-- had a mastectomy    Bursitis of hip 2019    Cancer     Chronic UTI     Fracture, femur 11/2018    GERD (gastroesophageal reflux disease)     Headache     Sinus headaches, migraines about once a year    History of migraine     Hypertension     Knee swelling 1963    Osteopenia     Reflux gastritis     Renal stone     RIGHT DEC 2021    Rheumatoid arthritis     Seborrheic dermatitis     SCLAP AREA-DRY ITCHY SKIN    Unsteady gait     LEFT HIP    Visual impairment     Reading Glasses while on PC    Weakness     LEFT HIP     Past Surgical History:   Procedure Laterality Date    BREAST SURGERY      MALIGNANT    BUNIONECTOMY Left     COLONOSCOPY      COLONOSCOPY N/A 07/18/2022    Procedure: COLONOSCOPY TO CECUM WITH COLD SNARE POLYPECTOMY;  Surgeon: Jamie Reyes MD;  Location: [facility];  Service: Gastroenterology;  Laterality: N/A;  PRE- SCREENING  POST- COLON POLYP    DILATATION AND CURETTAGE      EXTRACORPOREAL SHOCK WAVE LITHOTRIPSY (ESWL) Right 12/17/2021    Procedure: RIGHT EXTRACORPOREAL SHOCKWAVE LITHOTRIPSY AND CYSTOSCOPY;  Surgeon: Curtis Victor MD;  Location: [facility] OR [facility];  Service: Urology;  Laterality: Right;    FEMUR IM NAILING/RODDING Left 11/08/2018    Procedure: FEMUR INTRAMEDULLARY NAILING/RODDING;  Surgeon: Samy Altamirano II, MD;  Location: [facility] OR;  Service: Orthopedics    FRACTURE SURGERY  Nov.  "2018    Femur broke due to taking Boniva    HYSTERECTOMY      JOINT REPLACEMENT  2020    Left hip replacement    MASTECTOMY Left 2008    MALIGNANT    TOTAL HIP ARTHROPLASTY Left 01/25/2020    Procedure: left TOTAL HIP arthroplasty, orif femur and removal of hardware;  Surgeon: Samy Altamirano II, MD;  Location: [facility];  Service: Orthopedics    TRAM FLAP REVISION NIPPLE RECONSTRUCTION BREAST REDUCTION Left 2008     Family History   Problem Relation Age of Onset    Hypertension Mother     Arthritis Mother     Hyperlipidemia Mother     Hypertension Father     Heart attack Maternal Grandfather     Breast cancer Neg Hx     Colon cancer Neg Hx     Malig Hyperthermia Neg Hx     reports that she has never smoked. She has never been exposed to tobacco smoke. She has never used smokeless tobacco. She reports that she does not drink alcohol and does not use drugs.    OBJECTIVE    Vital Signs:   /70   Pulse 100   Ht 162.6 cm (64.02\")   Wt 58.6 kg (129 lb 3.2 oz)   SpO2 99%   BMI 22.17 kg/m²     Physical Exam  Constitutional:       Appearance: Normal appearance.   Cardiovascular:      Rate and Rhythm: Normal rate and regular rhythm.      Heart sounds: Normal heart sounds. No murmur heard.  Pulmonary:      Effort: Pulmonary effort is normal.      Breath sounds: Normal breath sounds.   Abdominal:      General: Abdomen is flat. There is no distension.      Palpations: Abdomen is soft.      Tenderness: There is no abdominal tenderness.   Skin:     General: Skin is warm and dry.   Neurological:      Mental Status: She is alert.   Psychiatric:         Mood and Affect: Mood normal.         Behavior: Behavior normal.         Thought Content: Thought content normal.     Walks with cane      The following data was reviewed by: Thomas Lino MD on 12/06/2024:  Common labs          3/8/2024    16:00 10/8/2024    08:14   Common Labs   Glucose  94    BUN  13    Creatinine  0.82    Sodium  141    Potassium  4.8  "   Chloride  103    Calcium  10.2    Total Protein  7.5    Albumin  5.2    Total Bilirubin  0.5    Alkaline Phosphatase  86    AST (SGOT)  26    ALT (SGPT)  21    WBC 8.21     8.59    Hemoglobin 13.7     15.4    Hematocrit 41.9     47.1    Platelets 246     268    Total Cholesterol  133    Triglycerides  123    HDL Cholesterol  64    LDL Cholesterol   48       Details          This result is from an external source.             Data reviewed : Recent rheumatology note              ASSESSMENT & PLAN        Annual physical exam  #Annual Preventative Health Examination   -Age and sex appropriate physical exam performed and documented. Updated past medical, family, social and surgical histories as well as allergies and care team list. Addressed care gaps listed in the medical record.  -Encouraged minimum of 30 minutes or more of exercise at a brisk walk or higher 5 days per week combined with a well-balanced diet.  -Immunizations reviewed and updated in EMR.  -Lipid screening:   -Depression screening:   -Colon cancer screening: Patient is already up to date on their colon cancer screening with colonoscopy is indicated again in 2027  -Lung cancer screening: Patient has never smoked.  -Breast cancer screening: Follows with Dr. Roman  Had hysterectomy.   Rheum checking DEXA          Essential hypertension  Blood pressure under good control today.  Continue current management.         Mixed hyperlipidemia   Lipid abnormalities are stable  The 10-year ASCVD risk score (Lewis DK, et al., 2019) is: 4.7%    Values used to calculate the score:      Age: 64 years      Sex: Female      Is Non- : No      Diabetic: No      Tobacco smoker: No      Systolic Blood Pressure: 124 mmHg      Is BP treated: Yes      HDL Cholesterol: 64 mg/dL      Total Cholesterol: 133 mg/dL  Continue rosuvastatin                Health Maintenance Due   Topic Date Due    INFLUENZA VACCINE  07/01/2024    COVID-19 Vaccine (4 -  2024-25 season) 09/01/2024    ANNUAL PHYSICAL  11/29/2024        Follow Up  No follow-ups on file.    Patient/family had no further questions at this time and verbalized understanding of the plan discussed today.

## 2024-12-06 NOTE — ASSESSMENT & PLAN NOTE
Lipid abnormalities are stable  The 10-year ASCVD risk score (Lewis DESAI, et al., 2019) is: 4.7%    Values used to calculate the score:      Age: 64 years      Sex: Female      Is Non- : No      Diabetic: No      Tobacco smoker: No      Systolic Blood Pressure: 124 mmHg      Is BP treated: Yes      HDL Cholesterol: 64 mg/dL      Total Cholesterol: 133 mg/dL  Continue rosuvastatin

## 2025-04-21 ENCOUNTER — PRE-ADMISSION TESTING (OUTPATIENT)
Dept: PREADMISSION TESTING | Facility: HOSPITAL | Age: 65
End: 2025-04-21
Payer: COMMERCIAL

## 2025-04-21 VITALS
HEIGHT: 64 IN | WEIGHT: 124 LBS | HEART RATE: 111 BPM | BODY MASS INDEX: 21.17 KG/M2 | TEMPERATURE: 98.7 F | DIASTOLIC BLOOD PRESSURE: 90 MMHG | RESPIRATION RATE: 20 BRPM | OXYGEN SATURATION: 98 % | SYSTOLIC BLOOD PRESSURE: 157 MMHG

## 2025-04-21 LAB
ANION GAP SERPL CALCULATED.3IONS-SCNC: 9 MMOL/L (ref 5–15)
BASOPHILS # BLD AUTO: 0.05 10*3/MM3 (ref 0–0.2)
BASOPHILS NFR BLD AUTO: 0.6 % (ref 0–1.5)
BUN SERPL-MCNC: 14 MG/DL (ref 8–23)
BUN/CREAT SERPL: 17.7 (ref 7–25)
CALCIUM SPEC-SCNC: 10.1 MG/DL (ref 8.6–10.5)
CHLORIDE SERPL-SCNC: 104 MMOL/L (ref 98–107)
CO2 SERPL-SCNC: 26 MMOL/L (ref 22–29)
CREAT SERPL-MCNC: 0.79 MG/DL (ref 0.57–1)
DEPRECATED RDW RBC AUTO: 38.5 FL (ref 37–54)
EGFRCR SERPLBLD CKD-EPI 2021: 83.6 ML/MIN/1.73
EOSINOPHIL # BLD AUTO: 0.39 10*3/MM3 (ref 0–0.4)
EOSINOPHIL NFR BLD AUTO: 4.6 % (ref 0.3–6.2)
ERYTHROCYTE [DISTWIDTH] IN BLOOD BY AUTOMATED COUNT: 12.8 % (ref 12.3–15.4)
GLUCOSE SERPL-MCNC: 91 MG/DL (ref 65–99)
HCT VFR BLD AUTO: 39.4 % (ref 34–46.6)
HGB BLD-MCNC: 12.7 G/DL (ref 12–15.9)
IMM GRANULOCYTES # BLD AUTO: 0.01 10*3/MM3 (ref 0–0.05)
IMM GRANULOCYTES NFR BLD AUTO: 0.1 % (ref 0–0.5)
LYMPHOCYTES # BLD AUTO: 2.44 10*3/MM3 (ref 0.7–3.1)
LYMPHOCYTES NFR BLD AUTO: 29 % (ref 19.6–45.3)
MCH RBC QN AUTO: 27.1 PG (ref 26.6–33)
MCHC RBC AUTO-ENTMCNC: 32.2 G/DL (ref 31.5–35.7)
MCV RBC AUTO: 84 FL (ref 79–97)
MONOCYTES # BLD AUTO: 0.67 10*3/MM3 (ref 0.1–0.9)
MONOCYTES NFR BLD AUTO: 8 % (ref 5–12)
NEUTROPHILS NFR BLD AUTO: 4.85 10*3/MM3 (ref 1.7–7)
NEUTROPHILS NFR BLD AUTO: 57.7 % (ref 42.7–76)
NRBC BLD AUTO-RTO: 0 /100 WBC (ref 0–0.2)
PLATELET # BLD AUTO: 239 10*3/MM3 (ref 140–450)
PMV BLD AUTO: 11.1 FL (ref 6–12)
POTASSIUM SERPL-SCNC: 3.9 MMOL/L (ref 3.5–5.2)
RBC # BLD AUTO: 4.69 10*6/MM3 (ref 3.77–5.28)
SODIUM SERPL-SCNC: 139 MMOL/L (ref 136–145)
WBC NRBC COR # BLD AUTO: 8.41 10*3/MM3 (ref 3.4–10.8)

## 2025-04-21 PROCEDURE — 36415 COLL VENOUS BLD VENIPUNCTURE: CPT

## 2025-04-21 PROCEDURE — 85025 COMPLETE CBC W/AUTO DIFF WBC: CPT

## 2025-04-21 PROCEDURE — 80048 BASIC METABOLIC PNL TOTAL CA: CPT

## 2025-04-21 RX ORDER — CYANOCOBALAMIN (VITAMIN B-12) 2500 MCG
1 TABLET, SUBLINGUAL SUBLINGUAL DAILY
COMMUNITY

## 2025-04-21 RX ORDER — CHLORHEXIDINE GLUCONATE 4 %
1 LIQUID (ML) TOPICAL DAILY
COMMUNITY

## 2025-04-21 NOTE — DISCHARGE INSTRUCTIONS
Take the following medications the morning of surgery with a small sip of water:        If you are on prescription narcotic pain medication to control your pain you may also take that medication the morning of surgery.    General Instructions:  Do not eat or drink anything after midnight the night before surgery.  Infants may have breast milk up to four hours before surgery.  Infants drinking formula may drink formula up to six hours before surgery.   Patients who avoid smoking, chewing tobacco and alcohol for 4 weeks prior to surgery have a reduced risk of post-operative complications.  Quit smoking as many days before surgery as you can.  Do not smoke, use chewing tobacco or drink alcohol the day of surgery.   If applicable bring your C-PAP/ BI-PAP machine in with you to preop day of surgery.  Bring any papers given to you in the doctor’s office.  Wear clean comfortable clothes.  Do not wear contact lenses, false eyelashes or make-up.  Bring a case for your glasses.   Bring crutches or walker if applicable.  Remove all piercings.  Leave jewelry and any other valuables at home.  Hair extensions with metal clips must be removed prior to surgery.  The Pre-Admission Testing nurse will instruct you to bring medications if unable to obtain an accurate list in Pre-Admission Testing.    Day of surgery you will need to let the preoperative nurse know the last time you took each of your medications.  To ensure a safe environment for patients and staff, we kindly ask that children under the age of 16 not accompany patients.  If you must bring a dependent child or dependent adult please ensure a responsible adult, other than yourself, is present to supervise them.      If you were given a blood bank ID arm band remember to bring it with you the day of surgery.    Preventing a Surgical Site Infection:  For 2 to 3 days before surgery, avoid shaving with a razor because the razor can irritate skin and make it easier to develop  an infection.    Any areas of open skin can increase the risk of a post-operative wound infection by allowing bacteria to enter and travel throughout the body.  Notify your surgeon if you have any skin wounds / rashes even if it is not near the expected surgical site.  The area will need assessed to determine if surgery should be delayed until it is healed.  The night prior to surgery shower using a fresh bar of anti-bacterial soap (such as Dial) and clean washcloth.  Sleep in a clean bed with clean clothing.  Do not allow pets to sleep with you.  Shower on the morning of surgery using a fresh bar of anti-bacterial soap (such as Dial) and clean washcloth.  Dry with a clean towel and dress in clean clothing.  Ask your surgeon if you will be receiving antibiotics prior to surgery.  Make sure you, your family, and all healthcare providers clean their hands with soap and water or an alcohol based hand  before caring for you or your wound.    Day of surgery:4/25/2025  Your arrival time is approximately two hours before your scheduled surgery time.  Please note if you have an early arrival time the surgery doors do not open before 5:00 AM.  Upon arrival, a Pre-op nurse and Anesthesiologist will review your health history, obtain vital signs, and answer questions you may have.  The only belongings needed at this time will be your home medications and if applicable your C-PAP/BI-PAP machine.  A Pre-op nurse will start an IV and you may receive medication in preparation for surgery, including something to help you relax.      Please be aware that surgery does come with discomfort.  We want to make every effort to control your discomfort so please discuss any uncontrolled symptoms with your nurse.   Your doctor will most likely have prescribed pain medications.      If you are going home after surgery you will receive individualized written care instructions before being discharged.  A responsible adult must drive  you to and from the hospital on the day of your surgery and ideally stay with you through the night.  Discharge prescriptions can be filled by the hospital pharmacy during regular pharmacy hours.  If you are having surgery late in the day/evening your prescription may be e-prescribed to your pharmacy.  Please verify your pharmacy hours or chose a 24 hour pharmacy to avoid not having access to your prescription because your pharmacy has closed for the day.    If you are staying overnight following surgery, you will be transported to your hospital room following the recovery period.  [facility] has all private rooms.    If you have any questions please call Pre-Admission Testing at [phone].  Deductibles and co-payments are collected on the day of service. Please be prepared to pay the required co-pay, deductible or deposit on the day of service as defined by your plan.    Call your surgeon immediately if you experience any of the following symptoms:  Sore Throat  Shortness of Breath or difficulty breathing  Cough  Chills  Body soreness or muscle pain  Headache  Fever  New loss of taste or smell  Do not arrive for your surgery ill.  Your procedure will need to be rescheduled to another time.  You will need to call your physician before the day of surgery to avoid any unnecessary exposure to hospital staff as well as other patients.

## 2025-05-04 DIAGNOSIS — E78.00 HIGH CHOLESTEROL: ICD-10-CM

## 2025-05-05 RX ORDER — ROSUVASTATIN CALCIUM 10 MG/1
10 TABLET, COATED ORAL
Qty: 90 TABLET | Refills: 2 | Status: SHIPPED | OUTPATIENT
Start: 2025-05-05

## 2025-05-09 ENCOUNTER — APPOINTMENT (OUTPATIENT)
Dept: CARDIOLOGY | Facility: HOSPITAL | Age: 65
End: 2025-05-09
Payer: COMMERCIAL

## 2025-05-09 ENCOUNTER — ANESTHESIA (OUTPATIENT)
Dept: PERIOP | Facility: HOSPITAL | Age: 65
End: 2025-05-09
Payer: COMMERCIAL

## 2025-05-09 ENCOUNTER — HOSPITAL ENCOUNTER (EMERGENCY)
Facility: HOSPITAL | Age: 65
Discharge: HOME | End: 2025-05-09
Attending: EMERGENCY MEDICINE
Payer: COMMERCIAL

## 2025-05-09 ENCOUNTER — APPOINTMENT (OUTPATIENT)
Dept: CT IMAGING | Facility: HOSPITAL | Age: 65
End: 2025-05-09
Payer: COMMERCIAL

## 2025-05-09 ENCOUNTER — HOSPITAL ENCOUNTER (OUTPATIENT)
Facility: HOSPITAL | Age: 65
Setting detail: HOSPITAL OUTPATIENT SURGERY
Discharge: HOME | End: 2025-05-09
Attending: UROLOGY | Admitting: UROLOGY
Payer: COMMERCIAL

## 2025-05-09 ENCOUNTER — ANESTHESIA EVENT (OUTPATIENT)
Dept: PERIOP | Facility: HOSPITAL | Age: 65
End: 2025-05-09
Payer: COMMERCIAL

## 2025-05-09 VITALS
DIASTOLIC BLOOD PRESSURE: 97 MMHG | TEMPERATURE: 99 F | HEART RATE: 105 BPM | BODY MASS INDEX: 20.49 KG/M2 | HEIGHT: 64 IN | WEIGHT: 120 LBS | OXYGEN SATURATION: 97 % | RESPIRATION RATE: 17 BRPM | SYSTOLIC BLOOD PRESSURE: 162 MMHG

## 2025-05-09 VITALS
TEMPERATURE: 98.2 F | WEIGHT: 124 LBS | HEIGHT: 64 IN | RESPIRATION RATE: 16 BRPM | SYSTOLIC BLOOD PRESSURE: 145 MMHG | BODY MASS INDEX: 21.17 KG/M2 | OXYGEN SATURATION: 99 % | HEART RATE: 87 BPM | DIASTOLIC BLOOD PRESSURE: 97 MMHG

## 2025-05-09 DIAGNOSIS — R10.9 ACUTE ABDOMINAL PAIN: Primary | ICD-10-CM

## 2025-05-09 DIAGNOSIS — Z98.890 POST-OPERATIVE STATE: ICD-10-CM

## 2025-05-09 DIAGNOSIS — N30.10 CHRONIC INTERSTITIAL CYSTITIS: ICD-10-CM

## 2025-05-09 DIAGNOSIS — N20.0 RENAL CALCULUS, LEFT: Primary | ICD-10-CM

## 2025-05-09 DIAGNOSIS — I47.10 SUPRAVENTRICULAR TACHYCARDIA, UNSPECIFIED: ICD-10-CM

## 2025-05-09 LAB
ALBUMIN SERPL-MCNC: 5 G/DL (ref 3.5–5.2)
ALBUMIN/GLOB SERPL: 1.8 G/DL
ALP SERPL-CCNC: 80 U/L (ref 39–117)
ALT SERPL W P-5'-P-CCNC: 27 U/L (ref 1–33)
ANION GAP SERPL CALCULATED.3IONS-SCNC: 14 MMOL/L (ref 5–15)
AST SERPL-CCNC: 32 U/L (ref 1–32)
BACTERIA UR QL AUTO: ABNORMAL /HPF
BASOPHILS # BLD AUTO: 0.01 10*3/MM3 (ref 0–0.2)
BASOPHILS NFR BLD AUTO: 0.1 % (ref 0–1.5)
BILIRUB SERPL-MCNC: 0.4 MG/DL (ref 0–1.2)
BILIRUB UR QL STRIP: NEGATIVE
BUN SERPL-MCNC: 17 MG/DL (ref 8–23)
BUN/CREAT SERPL: 17.3 (ref 7–25)
CALCIUM SPEC-SCNC: 10.2 MG/DL (ref 8.6–10.5)
CHLORIDE SERPL-SCNC: 101 MMOL/L (ref 98–107)
CLARITY UR: CLEAR
CO2 SERPL-SCNC: 20 MMOL/L (ref 22–29)
COLOR UR: ABNORMAL
CREAT SERPL-MCNC: 0.98 MG/DL (ref 0.57–1)
DEPRECATED RDW RBC AUTO: 39.1 FL (ref 37–54)
EGFRCR SERPLBLD CKD-EPI 2021: 64.6 ML/MIN/1.73
EOSINOPHIL # BLD AUTO: 0.01 10*3/MM3 (ref 0–0.4)
EOSINOPHIL NFR BLD AUTO: 0.1 % (ref 0.3–6.2)
ERYTHROCYTE [DISTWIDTH] IN BLOOD BY AUTOMATED COUNT: 12.9 % (ref 12.3–15.4)
GLOBULIN UR ELPH-MCNC: 2.8 GM/DL
GLUCOSE SERPL-MCNC: 143 MG/DL (ref 65–99)
GLUCOSE UR STRIP-MCNC: ABNORMAL MG/DL
HCT VFR BLD AUTO: 42.4 % (ref 34–46.6)
HGB BLD-MCNC: 14.1 G/DL (ref 12–15.9)
HGB UR QL STRIP.AUTO: ABNORMAL
HYALINE CASTS UR QL AUTO: ABNORMAL /LPF
IMM GRANULOCYTES # BLD AUTO: 0.02 10*3/MM3 (ref 0–0.05)
IMM GRANULOCYTES NFR BLD AUTO: 0.3 % (ref 0–0.5)
KETONES UR QL STRIP: NEGATIVE
LEUKOCYTE ESTERASE UR QL STRIP.AUTO: ABNORMAL
LYMPHOCYTES # BLD AUTO: 1.14 10*3/MM3 (ref 0.7–3.1)
LYMPHOCYTES NFR BLD AUTO: 15.5 % (ref 19.6–45.3)
MCH RBC QN AUTO: 27.9 PG (ref 26.6–33)
MCHC RBC AUTO-ENTMCNC: 33.3 G/DL (ref 31.5–35.7)
MCV RBC AUTO: 83.8 FL (ref 79–97)
MONOCYTES # BLD AUTO: 0.23 10*3/MM3 (ref 0.1–0.9)
MONOCYTES NFR BLD AUTO: 3.1 % (ref 5–12)
NEUTROPHILS NFR BLD AUTO: 5.94 10*3/MM3 (ref 1.7–7)
NEUTROPHILS NFR BLD AUTO: 80.9 % (ref 42.7–76)
NITRITE UR QL STRIP: NEGATIVE
NRBC BLD AUTO-RTO: 0 /100 WBC (ref 0–0.2)
PH UR STRIP.AUTO: 6 [PH] (ref 5–8)
PLATELET # BLD AUTO: 270 10*3/MM3 (ref 140–450)
PMV BLD AUTO: 10.6 FL (ref 6–12)
POTASSIUM SERPL-SCNC: 4.5 MMOL/L (ref 3.5–5.2)
PROT SERPL-MCNC: 7.8 G/DL (ref 6–8.5)
PROT UR QL STRIP: ABNORMAL
RBC # BLD AUTO: 5.06 10*6/MM3 (ref 3.77–5.28)
RBC # UR STRIP: ABNORMAL /HPF
REF LAB TEST METHOD: ABNORMAL
SODIUM SERPL-SCNC: 135 MMOL/L (ref 136–145)
SP GR UR STRIP: 1.01 (ref 1–1.03)
SQUAMOUS #/AREA URNS HPF: ABNORMAL /HPF
UROBILINOGEN UR QL STRIP: ABNORMAL
WBC # UR STRIP: ABNORMAL /HPF
WBC NRBC COR # BLD AUTO: 7.35 10*3/MM3 (ref 3.4–10.8)

## 2025-05-09 PROCEDURE — 74176 CT ABD & PELVIS W/O CONTRAST: CPT

## 2025-05-09 PROCEDURE — 81001 URINALYSIS AUTO W/SCOPE: CPT | Performed by: EMERGENCY MEDICINE

## 2025-05-09 PROCEDURE — 99204 OFFICE O/P NEW MOD 45 MIN: CPT | Performed by: STUDENT IN AN ORGANIZED HEALTH CARE EDUCATION/TRAINING PROGRAM

## 2025-05-09 PROCEDURE — 93010 ELECTROCARDIOGRAM REPORT: CPT | Performed by: INTERNAL MEDICINE

## 2025-05-09 PROCEDURE — 25810000003 LACTATED RINGERS PER 1000 ML: Performed by: ANESTHESIOLOGY

## 2025-05-09 PROCEDURE — 25810000003 SODIUM CHLORIDE 0.9 % SOLUTION: Performed by: EMERGENCY MEDICINE

## 2025-05-09 PROCEDURE — 80053 COMPREHEN METABOLIC PANEL: CPT | Performed by: EMERGENCY MEDICINE

## 2025-05-09 PROCEDURE — 25010000002 DEXAMETHASONE SODIUM PHOSPHATE 20 MG/5ML SOLUTION: Performed by: NURSE ANESTHETIST, CERTIFIED REGISTERED

## 2025-05-09 PROCEDURE — 31500 INSERT EMERGENCY AIRWAY: CPT | Performed by: ANESTHESIOLOGY

## 2025-05-09 PROCEDURE — C1769 GUIDE WIRE: HCPCS | Performed by: UROLOGY

## 2025-05-09 PROCEDURE — 25010000002 CEFAZOLIN PER 500 MG: Performed by: UROLOGY

## 2025-05-09 PROCEDURE — 93242 EXT ECG>48HR<7D RECORDING: CPT

## 2025-05-09 PROCEDURE — C1758 CATHETER, URETERAL: HCPCS | Performed by: UROLOGY

## 2025-05-09 PROCEDURE — 93005 ELECTROCARDIOGRAM TRACING: CPT | Performed by: ANESTHESIOLOGY

## 2025-05-09 PROCEDURE — 25010000002 PROPOFOL 10 MG/ML EMULSION: Performed by: NURSE ANESTHETIST, CERTIFIED REGISTERED

## 2025-05-09 PROCEDURE — 25010000002 LIDOCAINE 2% SOLUTION: Performed by: NURSE ANESTHETIST, CERTIFIED REGISTERED

## 2025-05-09 PROCEDURE — 99284 EMERGENCY DEPT VISIT MOD MDM: CPT

## 2025-05-09 PROCEDURE — 25010000002 ONDANSETRON PER 1 MG: Performed by: NURSE ANESTHETIST, CERTIFIED REGISTERED

## 2025-05-09 PROCEDURE — 25010000002 MIDAZOLAM PER 1 MG: Performed by: ANESTHESIOLOGY

## 2025-05-09 PROCEDURE — 85025 COMPLETE CBC W/AUTO DIFF WBC: CPT | Performed by: EMERGENCY MEDICINE

## 2025-05-09 RX ORDER — PROMETHAZINE HYDROCHLORIDE 25 MG/1
25 SUPPOSITORY RECTAL ONCE AS NEEDED
Status: DISCONTINUED | OUTPATIENT
Start: 2025-05-09 | End: 2025-05-09 | Stop reason: HOSPADM

## 2025-05-09 RX ORDER — LIDOCAINE HYDROCHLORIDE 10 MG/ML
0.5 INJECTION, SOLUTION INFILTRATION; PERINEURAL ONCE AS NEEDED
Status: DISCONTINUED | OUTPATIENT
Start: 2025-05-09 | End: 2025-05-09 | Stop reason: HOSPADM

## 2025-05-09 RX ORDER — DIPHENHYDRAMINE HYDROCHLORIDE 50 MG/ML
12.5 INJECTION, SOLUTION INTRAMUSCULAR; INTRAVENOUS
Status: DISCONTINUED | OUTPATIENT
Start: 2025-05-09 | End: 2025-05-09 | Stop reason: HOSPADM

## 2025-05-09 RX ORDER — PROPOFOL 10 MG/ML
VIAL (ML) INTRAVENOUS AS NEEDED
Status: DISCONTINUED | OUTPATIENT
Start: 2025-05-09 | End: 2025-05-09 | Stop reason: SURG

## 2025-05-09 RX ORDER — HYDROCODONE BITARTRATE AND ACETAMINOPHEN 5; 325 MG/1; MG/1
1 TABLET ORAL EVERY 6 HOURS PRN
Qty: 12 TABLET | Refills: 0 | Status: SHIPPED | OUTPATIENT
Start: 2025-05-09

## 2025-05-09 RX ORDER — SODIUM CHLORIDE 0.9 % (FLUSH) 0.9 %
3 SYRINGE (ML) INJECTION EVERY 12 HOURS SCHEDULED
Status: DISCONTINUED | OUTPATIENT
Start: 2025-05-09 | End: 2025-05-09 | Stop reason: HOSPADM

## 2025-05-09 RX ORDER — PROMETHAZINE HYDROCHLORIDE 25 MG/1
25 TABLET ORAL ONCE AS NEEDED
Status: DISCONTINUED | OUTPATIENT
Start: 2025-05-09 | End: 2025-05-09 | Stop reason: HOSPADM

## 2025-05-09 RX ORDER — HYDROMORPHONE HYDROCHLORIDE 1 MG/ML
0.25 INJECTION, SOLUTION INTRAMUSCULAR; INTRAVENOUS; SUBCUTANEOUS
Status: DISCONTINUED | OUTPATIENT
Start: 2025-05-09 | End: 2025-05-09 | Stop reason: HOSPADM

## 2025-05-09 RX ORDER — FENTANYL CITRATE 50 UG/ML
25 INJECTION, SOLUTION INTRAMUSCULAR; INTRAVENOUS
Status: DISCONTINUED | OUTPATIENT
Start: 2025-05-09 | End: 2025-05-09 | Stop reason: HOSPADM

## 2025-05-09 RX ORDER — ACETAMINOPHEN 500 MG
1000 TABLET ORAL ONCE
Status: COMPLETED | OUTPATIENT
Start: 2025-05-09 | End: 2025-05-09

## 2025-05-09 RX ORDER — ATROPINE SULFATE 0.4 MG/ML
0.4 INJECTION, SOLUTION INTRAMUSCULAR; INTRAVENOUS; SUBCUTANEOUS ONCE AS NEEDED
Status: DISCONTINUED | OUTPATIENT
Start: 2025-05-09 | End: 2025-05-09 | Stop reason: HOSPADM

## 2025-05-09 RX ORDER — HYDROCODONE BITARTRATE AND ACETAMINOPHEN 7.5; 325 MG/1; MG/1
1 TABLET ORAL EVERY 4 HOURS PRN
Status: DISCONTINUED | OUTPATIENT
Start: 2025-05-09 | End: 2025-05-09 | Stop reason: HOSPADM

## 2025-05-09 RX ORDER — MIDAZOLAM HYDROCHLORIDE 1 MG/ML
1 INJECTION, SOLUTION INTRAMUSCULAR; INTRAVENOUS
Status: DISCONTINUED | OUTPATIENT
Start: 2025-05-09 | End: 2025-05-09 | Stop reason: HOSPADM

## 2025-05-09 RX ORDER — ONDANSETRON 2 MG/ML
INJECTION INTRAMUSCULAR; INTRAVENOUS AS NEEDED
Status: DISCONTINUED | OUTPATIENT
Start: 2025-05-09 | End: 2025-05-09 | Stop reason: SURG

## 2025-05-09 RX ORDER — OXYBUTYNIN CHLORIDE 10 MG/1
10 TABLET, EXTENDED RELEASE ORAL DAILY
Qty: 30 TABLET | Refills: 2 | Status: SHIPPED | OUTPATIENT
Start: 2025-05-09 | End: 2026-05-09

## 2025-05-09 RX ORDER — LIDOCAINE HYDROCHLORIDE 20 MG/ML
INJECTION, SOLUTION INFILTRATION; PERINEURAL AS NEEDED
Status: DISCONTINUED | OUTPATIENT
Start: 2025-05-09 | End: 2025-05-09 | Stop reason: SURG

## 2025-05-09 RX ORDER — SODIUM CHLORIDE 0.9 % (FLUSH) 0.9 %
10 SYRINGE (ML) INJECTION AS NEEDED
Status: DISCONTINUED | OUTPATIENT
Start: 2025-05-09 | End: 2025-05-10 | Stop reason: HOSPADM

## 2025-05-09 RX ORDER — DROPERIDOL 2.5 MG/ML
0.62 INJECTION, SOLUTION INTRAMUSCULAR; INTRAVENOUS
Status: DISCONTINUED | OUTPATIENT
Start: 2025-05-09 | End: 2025-05-09 | Stop reason: HOSPADM

## 2025-05-09 RX ORDER — CEPHALEXIN 500 MG/1
500 CAPSULE ORAL 3 TIMES DAILY
Qty: 10 CAPSULE | Refills: 0 | Status: SHIPPED | OUTPATIENT
Start: 2025-05-09 | End: 2025-05-13

## 2025-05-09 RX ORDER — IPRATROPIUM BROMIDE AND ALBUTEROL SULFATE 2.5; .5 MG/3ML; MG/3ML
3 SOLUTION RESPIRATORY (INHALATION) ONCE AS NEEDED
Status: DISCONTINUED | OUTPATIENT
Start: 2025-05-09 | End: 2025-05-09 | Stop reason: HOSPADM

## 2025-05-09 RX ORDER — NALOXONE HCL 0.4 MG/ML
0.2 VIAL (ML) INJECTION AS NEEDED
Status: DISCONTINUED | OUTPATIENT
Start: 2025-05-09 | End: 2025-05-09 | Stop reason: HOSPADM

## 2025-05-09 RX ORDER — EPHEDRINE SULFATE 50 MG/ML
5 INJECTION, SOLUTION INTRAVENOUS ONCE AS NEEDED
Status: DISCONTINUED | OUTPATIENT
Start: 2025-05-09 | End: 2025-05-09 | Stop reason: HOSPADM

## 2025-05-09 RX ORDER — LABETALOL HYDROCHLORIDE 5 MG/ML
5 INJECTION, SOLUTION INTRAVENOUS
Status: DISCONTINUED | OUTPATIENT
Start: 2025-05-09 | End: 2025-05-09 | Stop reason: HOSPADM

## 2025-05-09 RX ORDER — MAGNESIUM HYDROXIDE 1200 MG/15ML
LIQUID ORAL AS NEEDED
Status: DISCONTINUED | OUTPATIENT
Start: 2025-05-09 | End: 2025-05-09 | Stop reason: HOSPADM

## 2025-05-09 RX ORDER — FLUMAZENIL 0.1 MG/ML
0.2 INJECTION INTRAVENOUS AS NEEDED
Status: DISCONTINUED | OUTPATIENT
Start: 2025-05-09 | End: 2025-05-09 | Stop reason: HOSPADM

## 2025-05-09 RX ORDER — SODIUM CHLORIDE, SODIUM LACTATE, POTASSIUM CHLORIDE, CALCIUM CHLORIDE 600; 310; 30; 20 MG/100ML; MG/100ML; MG/100ML; MG/100ML
9 INJECTION, SOLUTION INTRAVENOUS CONTINUOUS
Status: DISCONTINUED | OUTPATIENT
Start: 2025-05-09 | End: 2025-05-09 | Stop reason: HOSPADM

## 2025-05-09 RX ORDER — ONDANSETRON 2 MG/ML
4 INJECTION INTRAMUSCULAR; INTRAVENOUS ONCE AS NEEDED
Status: DISCONTINUED | OUTPATIENT
Start: 2025-05-09 | End: 2025-05-09 | Stop reason: HOSPADM

## 2025-05-09 RX ORDER — HYDROCODONE BITARTRATE AND ACETAMINOPHEN 5; 325 MG/1; MG/1
1 TABLET ORAL ONCE AS NEEDED
Status: COMPLETED | OUTPATIENT
Start: 2025-05-09 | End: 2025-05-09

## 2025-05-09 RX ORDER — SODIUM CHLORIDE 0.9 % (FLUSH) 0.9 %
3-10 SYRINGE (ML) INJECTION AS NEEDED
Status: DISCONTINUED | OUTPATIENT
Start: 2025-05-09 | End: 2025-05-09 | Stop reason: HOSPADM

## 2025-05-09 RX ORDER — DEXAMETHASONE SODIUM PHOSPHATE 4 MG/ML
INJECTION, SOLUTION INTRA-ARTICULAR; INTRALESIONAL; INTRAMUSCULAR; INTRAVENOUS; SOFT TISSUE AS NEEDED
Status: DISCONTINUED | OUTPATIENT
Start: 2025-05-09 | End: 2025-05-09 | Stop reason: SURG

## 2025-05-09 RX ORDER — HYDRALAZINE HYDROCHLORIDE 20 MG/ML
5 INJECTION INTRAMUSCULAR; INTRAVENOUS
Status: DISCONTINUED | OUTPATIENT
Start: 2025-05-09 | End: 2025-05-09 | Stop reason: HOSPADM

## 2025-05-09 RX ADMIN — CEFAZOLIN 1000 MG: 1 INJECTION, POWDER, FOR SOLUTION INTRAMUSCULAR; INTRAVENOUS at 10:48

## 2025-05-09 RX ADMIN — ACETAMINOPHEN 1000 MG: 500 TABLET, FILM COATED ORAL at 07:43

## 2025-05-09 RX ADMIN — SODIUM CHLORIDE, POTASSIUM CHLORIDE, SODIUM LACTATE AND CALCIUM CHLORIDE 9 ML/HR: 600; 310; 30; 20 INJECTION, SOLUTION INTRAVENOUS at 07:43

## 2025-05-09 RX ADMIN — ONDANSETRON 4 MG: 2 INJECTION, SOLUTION INTRAMUSCULAR; INTRAVENOUS at 11:10

## 2025-05-09 RX ADMIN — DEXAMETHASONE SODIUM PHOSPHATE 4 MG: 4 INJECTION, SOLUTION INTRAMUSCULAR; INTRAVENOUS at 11:10

## 2025-05-09 RX ADMIN — PROPOFOL 120 MG: 10 INJECTION, EMULSION INTRAVENOUS at 11:00

## 2025-05-09 RX ADMIN — METOPROLOL TARTRATE 2.5 MG: 1 INJECTION, SOLUTION INTRAVENOUS at 09:13

## 2025-05-09 RX ADMIN — SODIUM CHLORIDE 1000 ML: 9 INJECTION, SOLUTION INTRAVENOUS at 21:55

## 2025-05-09 RX ADMIN — MIDAZOLAM 1 MG: 1 INJECTION INTRAMUSCULAR; INTRAVENOUS at 09:12

## 2025-05-09 RX ADMIN — METOPROLOL TARTRATE 2.5 MG: 1 INJECTION, SOLUTION INTRAVENOUS at 07:54

## 2025-05-09 RX ADMIN — HYDROCODONE BITARTRATE AND ACETAMINOPHEN 1 TABLET: 5; 325 TABLET ORAL at 11:52

## 2025-05-09 RX ADMIN — LIDOCAINE HYDROCHLORIDE 3 ML: 20 INJECTION, SOLUTION INFILTRATION; PERINEURAL at 11:00

## 2025-05-09 NOTE — OP NOTE
Operative Report     [facility]    Patient: Bonnie Syed  Age:      64 y.o.  :     1960  Sex:      female    Medical Record:  [id number]    Date of Operation/Procedure:  2025    Pre-operative Diagnosis: Left renal calculus  Rule out interstitial cystitis    Post-operative Diagnosis: Same    Surgeon: Neftali Victor MD    Name of Operation/Procedure:  Procedure(s) and Anesthesia Type:     * LEFT SHOCKWAVE LITHOTRIPSY WITH CYSTOSCOPY FOR LEFT RENAL CALCULUS - General    Findings/Complications: Patient has a solitary left renal calculus cystoscopic examination reveals a Hunner's ulcer in the posterior wall of the bladder consistent with chronic interstitial cystitis    Description of procedure: 64-year-old female whose primary complaint is urgency frequency and dysuria was seen at [facility] cyst and x-ray shows stone in the lower pole of the left kidney and after discussion of benefits risk alternative therapies she is elected to proceed with shockwave lithotripsy because of her bladder complaints we plan a cystoscopic examination of while she is under general anesthesia to rule out interstitial cystitis or other causes of her bladder complaints.    The patient was brought to [facility] underwent general anesthesia and imaging with biplanar fluoroscopy identified her stone after appropriate positioning she was treated with shockwaves on the Storz F2 because of her small size we went to a power setting only of 6 she received 3000 shocks tolerated the procedure without complication    During the shockwave portion of her surgery she was placed in a frog-leg position prepped and draped in a sterile fashion flexible cystoscopic semination was carried out patient's total bladder mucosa was normal except for the posterior wall where a fairly typical appearing Hunner's ulcer was present there were no other areas of hemorrhagic change in the bladder but this appearance is certainly consistent with the  possible diagnosis of chronic interstitial cystitis this will be evaluated when she returns to [facility] postoperatively  Estimated Blood Loss: none    Specimens: * No orders in the log *    Fluids/Drains: None    Curtis Victor MD  5/9/2025  11:29 EDT

## 2025-05-09 NOTE — ANESTHESIA PROCEDURE NOTES
Airway  Reason: elective    Date/Time: 5/9/2025 11:00 AM  Airway not difficult    General Information and Staff    Patient location during procedure: ICU  Anesthesiologist: Saul Martinez MD  CRNA/CAA: Tor Mckeon CRNA    Indications and Patient Condition  Indications for airway management: airway protection    Preoxygenated: yes    Mask difficulty assessment: 0 - not attempted    Final Airway Details    Final airway type: supraglottic airway      Successful airway: LMA  Size: 4   Number of attempts at approach: 1  Assessment: lips, teeth, and gum same as pre-op and atraumatic intubation

## 2025-05-09 NOTE — ANESTHESIA PREPROCEDURE EVALUATION
Anesthesia Evaluation     Patient summary reviewed and Nursing notes reviewed   NPO Solid Status: > 6 hours  NPO Liquid Status: > 2 hours           Airway   Mallampati: II  TM distance: >3 FB  Neck ROM: full  Dental - normal exam     Pulmonary    (-) COPD, asthma, not a smoker  Cardiovascular   Exercise tolerance: good (4-7 METS)    ECG reviewed    (+) hypertension, dysrhythmias (SVT preop) Tachycardia, hyperlipidemia  (-) past MI, angina      Neuro/Psych  (+) psychiatric history Anxiety  (-) seizures, CVA  GI/Hepatic/Renal/Endo    (+) GERD well controlled, renal disease- stones  (-) liver disease, diabetes, no thyroid disorder    Musculoskeletal     Abdominal    Substance History      OB/GYN          Other   arthritis (RA, on remicaid, no major neck involvement, no related organ dysfxn),   history of cancer (hx breast ca)                Anesthesia Plan    ASA 3     general     (Pt HR 150s on preop exam. Dropped to 110  w/ apparent rhythm change w/ 2.5 metop IV. Consulted cardiology, agrees likely SVT. Since pt is otherwise low risk (no CAD hx, >4 mets, denies CP, Lua, stable/asymptomatic in SVT) , SVT broke easily and is likely anxiety related, and procedure is low risk, we agree pt is ok to proceed with procedure today w/ plan for Holter monitor and outpt cardiology followup. D/w pt.)    Anesthetic plan, risks, benefits, and alternatives have been provided, discussed and informed consent has been obtained with: patient.    CODE STATUS:

## 2025-05-09 NOTE — ANESTHESIA POSTPROCEDURE EVALUATION
Patient: Bonnie Syed    Procedure Summary       Date: 05/09/25 Room / Location: [facility] OR 09 / [facility] MAIN OR    Anesthesia Start: 1055 Anesthesia Stop: 1138    Procedure: LEFT SHOCKWAVE LITHOTRIPSY WITH CYSTOSCOPY FOR LEFT RENAL CALCULUS (Left) Diagnosis:     Surgeons: Curtis Victor MD Provider: Saul Martinez MD    Anesthesia Type: general ASA Status: 3            Anesthesia Type: general    Vitals  Vitals Value Taken Time   /89 05/09/25 12:30   Temp 36.8 °C (98.2 °F) 05/09/25 12:15   Pulse 89 05/09/25 12:38   Resp 16 05/09/25 12:15   SpO2 98 % 05/09/25 12:38   Vitals shown include unfiled device data.        Post Anesthesia Care and Evaluation    Patient location during evaluation: PHASE II  Patient participation: complete - patient participated  Level of consciousness: awake  Pain management: satisfactory to patient    Airway patency: patent  Anesthetic complications: No anesthetic complications  PONV Status: controlled  Cardiovascular status: acceptable  Respiratory status: acceptable  Hydration status: acceptable

## 2025-05-09 NOTE — H&P
FIRST UROLOGY CONSULT      Patient Identification:  NAME:  Bonnie Syed  Age:  64 y.o.   Sex:  female   :  1960   MRN:  [id number]     Chief complaint: Left renal calculi    History of present illness:      64-year-old female who has had multiple medical issues including bladder stones had a CT scan showing cluster of stones in the lower pole of the left kidney and after a discussion of benefits risk and alternative therapies she elected to proceed with shockwave treatment to her stone preoperatively in the preoperative surgical suites she was noted to have a rapid heart rate and atrial fibrillation cardiology consult was obtained she was given antiarrhythmic drugs and cleared for our surgical procedure today    In hospital:  -AVSS, good UOP  -WBC -   -Creat -   -UA -     -CT -        Asked to see    Past medical history:  Past Medical History:   Diagnosis Date    Allergic     takes 2 allergy injections weekly    Anxiety     Bladder stone     Breast cancer     left-- had a mastectomy    Bursitis of hip 2019    Cancer     left breast  with lymph node dissection  had reconstruction    Fracture, femur 2018    Frequent UTI     GERD (gastroesophageal reflux disease)     Headache     Sinus headaches, migraines about once a year    History of cancer chemotherapy     History of migraine     Hyperlipidemia     Hypertension     Interstitial cystitis     Juvenile arthritis     age 3 in left knee    Knee swelling 1963    Osteopenia     Reflux gastritis     Renal stone     RIGHT DEC 2021    Rheumatoid arthritis     on monthly injection    Seborrheic dermatitis     SCLAP AREA-DRY ITCHY SKIN    Unsteady gait     LEFT HIP    Visual impairment     Reading Glasses while on PC    Weakness     LEFT HIP       Past surgical history:  Past Surgical History:   Procedure Laterality Date    BREAST CAPSULOTOMY, IMPLANT REVISION Left     BREAST TISSUE EXPANDER REMOVAL INSERTION OF IMPLANT Left     BUNIONECTOMY Left      COLONOSCOPY      COLONOSCOPY N/A 07/18/2022    Procedure: COLONOSCOPY TO CECUM WITH COLD SNARE POLYPECTOMY;  Surgeon: Jamie Reyes MD;  Location: [facility] ENDOSCOPY;  Service: Gastroenterology;  Laterality: N/A;  PRE- SCREENING  POST- COLON POLYP    D & C HYSTEROSCOPY      39 years ago    DILATATION AND CURETTAGE      EXTRACORPOREAL SHOCK WAVE LITHOTRIPSY (ESWL) Right 12/17/2021    Procedure: RIGHT EXTRACORPOREAL SHOCKWAVE LITHOTRIPSY AND CYSTOSCOPY;  Surgeon: Curtis Victor MD;  [facility];  Service: Urology;  Laterality: Right;    FEMUR IM NAILING/RODDING Left 11/08/2018    Procedure: FEMUR INTRAMEDULLARY NAILING/RODDING;  Surgeon: Samy Altamirano II, MD;  Location: [facility] MAIN OR;  Service: Orthopedics    FRACTURE SURGERY  Nov. 2018    Femur broke due to taking Boniva    HARDWARE REMOVAL Left     femur when hip replacment was done    HYSTERECTOMY      KIDNEY STONE SURGERY  2024    x2    MASTECTOMY Left 2008    MALIGNANT  lymph nodes    NIPPLE TATTOO      SALPINGECTOMY Right     tubal preg    TISSUE EXPANDER PLACEMENT  2008    TOTAL HIP ARTHROPLASTY Left 01/25/2020    Procedure: left TOTAL HIP arthroplasty, orif femur and removal of hardware;  Surgeon: Samy Altamirano II, MD;  Location: [facility] MAIN OR;  Service: Orthopedics    VENOUS ACCESS DEVICE (PORT) INSERTION AND REMOVAL  2008       Allergies:  Ibandronate, Monistat [miconazole], Sulfa antibiotics, and Misc. sulfonamide containing compounds    Home medications:  Medications Prior to Admission   Medication Sig Dispense Refill Last Dose/Taking    busPIRone (BUSPAR) 10 MG tablet TAKE 1 TABLET BY MOUTH THREE TIMES A DAY (Patient taking differently: Take 1 tablet by mouth 3 (Three) Times a Day As Needed.) 270 tablet 1 5/9/2025 at  5:00 AM    inFLIXimab (REMICADE IV) Infuse  into a venous catheter Every 30 (Thirty) Days. Once a month for arthritis   Past Month    levocetirizine (XYZAL) 5 MG tablet Take 1 tablet by  mouth At Night As Needed.   5/8/2025 Evening    montelukast (SINGULAIR) 10 MG tablet Take 1 tablet by mouth Daily.   5/8/2025 Morning    rosuvastatin (CRESTOR) 10 MG tablet TAKE 1 TABLET BY MOUTH EVERY DAY AT NIGHT 90 tablet 2 5/8/2025 Bedtime    valsartan (Diovan) 40 MG tablet Take 1 tablet by mouth Daily. 90 tablet 2 5/8/2025 Morning    ALLERGY SERUM INJECTION Inject  under the skin into the appropriate area as directed 1 (One) Time Per Week. monday 4/21/2025    Biotin 5000 MCG sublingual tablet Place 1 tablet under the tongue Daily.   5/1/2025    calcium carbonate-vitamin d (CALTRATE 600+D) 600-400 MG-UNIT per tablet One tab po BID for osteopenia (Patient taking differently: Take 1 tablet by mouth 2 (Two) Times a Day.)   5/1/2025    clobetasol (TEMOVATE) 0.05 % ointment Apply 1 Application topically to the appropriate area as directed As Needed.   5/1/2025    estradiol (ESTRACE) 0.1 MG/GM vaginal cream Insert 1 g into the vagina 2 (Two) Times a Week.   5/1/2025    Multiple Vitamin (MULTIVITAMIN) capsule Take 1 capsule by mouth Daily. HOLD PRIOR TO SURGERY   5/1/2025    Multiple Vitamins-Minerals (Hair Skin & Nails) tablet Take 1 tablet by mouth Daily.   5/1/2025        Hospital medications:  ceFAZolin, 1,000 mg, Intravenous, Once  sodium chloride, 3 mL, Intravenous, Q12H      lactated ringers, 9 mL/hr, Last Rate: 9 mL/hr (05/09/25 0743)        lidocaine    midazolam    sodium chloride    Family history:  Family History   Problem Relation Age of Onset    Hypertension Mother     Arthritis Mother     Hyperlipidemia Mother     Hypertension Father     Heart attack Maternal Grandfather     Breast cancer Neg Hx     Colon cancer Neg Hx     Malig Hyperthermia Neg Hx        Social history:  Social History     Tobacco Use    Smoking status: Never     Passive exposure: Never    Smokeless tobacco: Never   Vaping Use    Vaping status: Never Used   Substance Use Topics    Alcohol use: No    Drug use: Never       Review of  systems:      Positive for:  nothing  Negative for:  chest pain, cough, sob, o/w neg    Objective:  TMax 24 hours:   Temp (24hrs), Av °F (36.7 °C), Min:98 °F (36.7 °C), Max:98 °F (36.7 °C)      Vitals Ranges:   Temp:  [98 °F (36.7 °C)] 98 °F (36.7 °C)  Heart Rate:  [105-137] 108  BP: (151-159)/() 152/96    Intake/Output Last 3 shifts:  No intake/output data recorded.     Physical Exam:    General Appearance:    Alert, cooperative, NAD   Back:     No CVA tenderness   Lungs:     Respirations unlabored, no wheezing    Heart:    RRR, intact peripheral pulses   Abdomen:     Soft, NDNT, no masses, no guarding   :    Pelvic not performed, bladder nondistended and nontender   Neuro/Psych:   Orientation intact, mood/affect pleasant       Results review:   I reviewed the patient's new clinical results.    Data review:  Lab Results (last 24 hours)       ** No results found for the last 24 hours. **             Imaging:  Imaging Results (Last 24 Hours)       ** No results found for the last 24 hours. **             Assessment:     Left renal calculus    Plan:     Left extracorporeal shockwave lithotripsy  Cystoscopy to evaluate overactive bladder and interstitial cystitis     FIRST UROLOGY CONSULT      Patient Identification:  NAME:  Bonnie Syed  Age:  64 y.o.   Sex:  female   :  1960   MRN:  [id number]     Chief complaint:     History of present illness:          In hospital:  -AVSS, good UOP  -WBC -   -Creat -   -UA -     -CT -        Asked to see    Past medical history:  Past Medical History:   Diagnosis Date    Allergic     takes 2 allergy injections weekly    Anxiety     Bladder stone     Breast cancer     left-- had a mastectomy    Bursitis of hip     Cancer     left breast  with lymph node dissection  had reconstruction    Fracture, femur 2018    Frequent UTI     GERD (gastroesophageal reflux disease)     Headache     Sinus headaches, migraines about once a year    History of cancer  chemotherapy 2008    History of migraine     Hyperlipidemia     Hypertension     Interstitial cystitis     Juvenile arthritis     age 3 in left knee    Knee swelling 1963    Osteopenia     Reflux gastritis     Renal stone     RIGHT DEC 2021    Rheumatoid arthritis     on monthly injection    Seborrheic dermatitis     SCLAP AREA-DRY ITCHY SKIN    Unsteady gait     LEFT HIP    Visual impairment     Reading Glasses while on PC    Weakness     LEFT HIP       Past surgical history:  Past Surgical History:   Procedure Laterality Date    BREAST CAPSULOTOMY, IMPLANT REVISION Left     BREAST TISSUE EXPANDER REMOVAL INSERTION OF IMPLANT Left     BUNIONECTOMY Left     COLONOSCOPY      COLONOSCOPY N/A 07/18/2022    Procedure: COLONOSCOPY TO CECUM WITH COLD SNARE POLYPECTOMY;  Surgeon: Jamie Reyes MD;  Location: [facility] ENDOSCOPY;  Service: Gastroenterology;  Laterality: N/A;  PRE- SCREENING  POST- COLON POLYP    D & C HYSTEROSCOPY      39 years ago    DILATATION AND CURETTAGE      EXTRACORPOREAL SHOCK WAVE LITHOTRIPSY (ESWL) Right 12/17/2021    Procedure: RIGHT EXTRACORPOREAL SHOCKWAVE LITHOTRIPSY AND CYSTOSCOPY;  Surgeon: Curtis Victor MD;  Location: [facility] OR OSC;  Service: Urology;  Laterality: Right;    FEMUR IM NAILING/RODDING Left 11/08/2018    Procedure: FEMUR INTRAMEDULLARY NAILING/RODDING;  Surgeon: Samy Altamirano II, MD;  Location: [facility] MAIN OR;  Service: Orthopedics    FRACTURE SURGERY  Nov. 2018    Femur broke due to taking Boniva    HARDWARE REMOVAL Left     femur when hip replacment was done    HYSTERECTOMY      KIDNEY STONE SURGERY  2024    x2    MASTECTOMY Left 2008    MALIGNANT  lymph nodes    NIPPLE TATTOO      SALPINGECTOMY Right     tubal preg    TISSUE EXPANDER PLACEMENT  2008    TOTAL HIP ARTHROPLASTY Left 01/25/2020    Procedure: left TOTAL HIP arthroplasty, orif femur and removal of hardware;  Surgeon: Samy Altamirano II, MD;  Location: [facility] MAIN OR;  Service:  Orthopedics    VENOUS ACCESS DEVICE (PORT) INSERTION AND REMOVAL  2008       Allergies:  Ibandronate, Monistat [miconazole], Sulfa antibiotics, and Misc. sulfonamide containing compounds    Home medications:  Medications Prior to Admission   Medication Sig Dispense Refill Last Dose/Taking    busPIRone (BUSPAR) 10 MG tablet TAKE 1 TABLET BY MOUTH THREE TIMES A DAY (Patient taking differently: Take 1 tablet by mouth 3 (Three) Times a Day As Needed.) 270 tablet 1 5/9/2025 at  5:00 AM    inFLIXimab (REMICADE IV) Infuse  into a venous catheter Every 30 (Thirty) Days. Once a month for arthritis   Past Month    levocetirizine (XYZAL) 5 MG tablet Take 1 tablet by mouth At Night As Needed.   5/8/2025 Evening    montelukast (SINGULAIR) 10 MG tablet Take 1 tablet by mouth Daily.   5/8/2025 Morning    rosuvastatin (CRESTOR) 10 MG tablet TAKE 1 TABLET BY MOUTH EVERY DAY AT NIGHT 90 tablet 2 5/8/2025 Bedtime    valsartan (Diovan) 40 MG tablet Take 1 tablet by mouth Daily. 90 tablet 2 5/8/2025 Morning    ALLERGY SERUM INJECTION Inject  under the skin into the appropriate area as directed 1 (One) Time Per Week. monday 4/21/2025    Biotin 5000 MCG sublingual tablet Place 1 tablet under the tongue Daily.   5/1/2025    calcium carbonate-vitamin d (CALTRATE 600+D) 600-400 MG-UNIT per tablet One tab po BID for osteopenia (Patient taking differently: Take 1 tablet by mouth 2 (Two) Times a Day.)   5/1/2025    clobetasol (TEMOVATE) 0.05 % ointment Apply 1 Application topically to the appropriate area as directed As Needed.   5/1/2025    estradiol (ESTRACE) 0.1 MG/GM vaginal cream Insert 1 g into the vagina 2 (Two) Times a Week.   5/1/2025    Multiple Vitamin (MULTIVITAMIN) capsule Take 1 capsule by mouth Daily. HOLD PRIOR TO SURGERY   5/1/2025    Multiple Vitamins-Minerals (Hair Skin & Nails) tablet Take 1 tablet by mouth Daily.   5/1/2025        Hospital medications:  ceFAZolin, 1,000 mg, Intravenous, Once  sodium chloride, 3 mL,  Intravenous, Q12H      lactated ringers, 9 mL/hr, Last Rate: 9 mL/hr (25 0743)        lidocaine    midazolam    sodium chloride    Family history:  Family History   Problem Relation Age of Onset    Hypertension Mother     Arthritis Mother     Hyperlipidemia Mother     Hypertension Father     Heart attack Maternal Grandfather     Breast cancer Neg Hx     Colon cancer Neg Hx     Malig Hyperthermia Neg Hx        Social history:  Social History     Tobacco Use    Smoking status: Never     Passive exposure: Never    Smokeless tobacco: Never   Vaping Use    Vaping status: Never Used   Substance Use Topics    Alcohol use: No    Drug use: Never       Review of systems:      Positive for:  nothing  Negative for:  chest pain, cough, sob, o/w neg    Objective:  TMax 24 hours:   Temp (24hrs), Av °F (36.7 °C), Min:98 °F (36.7 °C), Max:98 °F (36.7 °C)      Vitals Ranges:   Temp:  [98 °F (36.7 °C)] 98 °F (36.7 °C)  Heart Rate:  [105-137] 108  BP: (151-159)/() 152/96    Intake/Output Last 3 shifts:  No intake/output data recorded.     Physical Exam:    General Appearance:    Alert, cooperative, NAD   Back:     No CVA tenderness   Lungs:     Respirations unlabored, no wheezing    Heart:    RRR, intact peripheral pulses   Abdomen:     Soft, NDNT, no masses, no guarding   :    Pelvic not performed, bladder nondistended and nontender   Neuro/Psych:   Orientation intact, mood/affect pleasant       Results review:   I reviewed the patient's new clinical results.    Data review:  Lab Results (last 24 hours)       ** No results found for the last 24 hours. **             Imaging:  Imaging Results (Last 24 Hours)       ** No results found for the last 24 hours. **             Assessment:         Plan:         Curtis Victor MD  25  09:40 EDT         FIRST UROLOGY CONSULT      Patient Identification:  NAME:  Bonnie Syed  Age:  64 y.o.   Sex:  female   :  1960   MRN:  [id number]     Chief complaint:      History of present illness:          In hospital:  -AVSS, good UOP  -WBC -   -Creat -   -UA -     -CT -        Asked to see    Past medical history:  Past Medical History:   Diagnosis Date    Allergic     takes 2 allergy injections weekly    Anxiety     Bladder stone     Breast cancer 2008    left-- had a mastectomy    Bursitis of hip 2019    Cancer 2008    left breast  with lymph node dissection  had reconstruction    Fracture, femur 11/2018    Frequent UTI     GERD (gastroesophageal reflux disease)     Headache     Sinus headaches, migraines about once a year    History of cancer chemotherapy 2008    History of migraine     Hyperlipidemia     Hypertension     Interstitial cystitis     Juvenile arthritis     age 3 in left knee    Knee swelling 1963    Osteopenia     Reflux gastritis     Renal stone     RIGHT DEC 2021    Rheumatoid arthritis     on monthly injection    Seborrheic dermatitis     SCLAP AREA-DRY ITCHY SKIN    Unsteady gait     LEFT HIP    Visual impairment     Reading Glasses while on PC    Weakness     LEFT HIP       Past surgical history:  Past Surgical History:   Procedure Laterality Date    BREAST CAPSULOTOMY, IMPLANT REVISION Left     BREAST TISSUE EXPANDER REMOVAL INSERTION OF IMPLANT Left     BUNIONECTOMY Left     COLONOSCOPY      COLONOSCOPY N/A 07/18/2022    Procedure: COLONOSCOPY TO CECUM WITH COLD SNARE POLYPECTOMY;  Surgeon: Jamie Reyes MD;  Location: [facility] ENDOSCOPY;  Service: Gastroenterology;  Laterality: N/A;  PRE- SCREENING  POST- COLON POLYP    D & C HYSTEROSCOPY      39 years ago    DILATATION AND CURETTAGE      EXTRACORPOREAL SHOCK WAVE LITHOTRIPSY (ESWL) Right 12/17/2021    Procedure: RIGHT EXTRACORPOREAL SHOCKWAVE LITHOTRIPSY AND CYSTOSCOPY;  Surgeon: Curtis Victor MD;  Location: [facility] OR [facility];  Service: Urology;  Laterality: Right;    FEMUR IM NAILING/RODDING Left 11/08/2018    Procedure: FEMUR INTRAMEDULLARY NAILING/RODDING;  Surgeon: Samy Altamirano  Kulwinder NEVILLE MD;  Location: [facility] OR;  Service: Orthopedics    FRACTURE SURGERY  Nov. 2018    Femur broke due to taking Boniva    HARDWARE REMOVAL Left     femur when hip replacment was done    HYSTERECTOMY      KIDNEY STONE SURGERY  2024    x2    MASTECTOMY Left 2008    MALIGNANT  lymph nodes    NIPPLE TATTOO      SALPINGECTOMY Right     tubal preg    TISSUE EXPANDER PLACEMENT  2008    TOTAL HIP ARTHROPLASTY Left 01/25/2020    Procedure: left TOTAL HIP arthroplasty, orif femur and removal of hardware;  Surgeon: Samy Altamirano II, MD;  Location: [facility] OR;  Service: Orthopedics    VENOUS ACCESS DEVICE (PORT) INSERTION AND REMOVAL  2008       Allergies:  Ibandronate, Monistat [miconazole], Sulfa antibiotics, and Misc. sulfonamide containing compounds    Home medications:  Medications Prior to Admission   Medication Sig Dispense Refill Last Dose/Taking    busPIRone (BUSPAR) 10 MG tablet TAKE 1 TABLET BY MOUTH THREE TIMES A DAY (Patient taking differently: Take 1 tablet by mouth 3 (Three) Times a Day As Needed.) 270 tablet 1 5/9/2025 at  5:00 AM    inFLIXimab (REMICADE IV) Infuse  into a venous catheter Every 30 (Thirty) Days. Once a month for arthritis   Past Month    levocetirizine (XYZAL) 5 MG tablet Take 1 tablet by mouth At Night As Needed.   5/8/2025 Evening    montelukast (SINGULAIR) 10 MG tablet Take 1 tablet by mouth Daily.   5/8/2025 Morning    rosuvastatin (CRESTOR) 10 MG tablet TAKE 1 TABLET BY MOUTH EVERY DAY AT NIGHT 90 tablet 2 5/8/2025 Bedtime    valsartan (Diovan) 40 MG tablet Take 1 tablet by mouth Daily. 90 tablet 2 5/8/2025 Morning    ALLERGY SERUM INJECTION Inject  under the skin into the appropriate area as directed 1 (One) Time Per Week. monday 4/21/2025    Biotin 5000 MCG sublingual tablet Place 1 tablet under the tongue Daily.   5/1/2025    calcium carbonate-vitamin d (CALTRATE 600+D) 600-400 MG-UNIT per tablet One tab po BID for osteopenia (Patient taking differently:  Take 1 tablet by mouth 2 (Two) Times a Day.)   2025    clobetasol (TEMOVATE) 0.05 % ointment Apply 1 Application topically to the appropriate area as directed As Needed.   2025    estradiol (ESTRACE) 0.1 MG/GM vaginal cream Insert 1 g into the vagina 2 (Two) Times a Week.   2025    Multiple Vitamin (MULTIVITAMIN) capsule Take 1 capsule by mouth Daily. HOLD PRIOR TO SURGERY   2025    Multiple Vitamins-Minerals (Hair Skin & Nails) tablet Take 1 tablet by mouth Daily.   2025        Hospital medications:  ceFAZolin, 1,000 mg, Intravenous, Once  sodium chloride, 3 mL, Intravenous, Q12H      lactated ringers, 9 mL/hr, Last Rate: 9 mL/hr (25 0743)        lidocaine    midazolam    sodium chloride    Family history:  Family History   Problem Relation Age of Onset    Hypertension Mother     Arthritis Mother     Hyperlipidemia Mother     Hypertension Father     Heart attack Maternal Grandfather     Breast cancer Neg Hx     Colon cancer Neg Hx     Malig Hyperthermia Neg Hx        Social history:  Social History     Tobacco Use    Smoking status: Never     Passive exposure: Never    Smokeless tobacco: Never   Vaping Use    Vaping status: Never Used   Substance Use Topics    Alcohol use: No    Drug use: Never       Review of systems:      Positive for:  nothing  Negative for:  chest pain, cough, sob, o/w neg    Objective:  TMax 24 hours:   Temp (24hrs), Av °F (36.7 °C), Min:98 °F (36.7 °C), Max:98 °F (36.7 °C)      Vitals Ranges:   Temp:  [98 °F (36.7 °C)] 98 °F (36.7 °C)  Heart Rate:  [105-137] 108  BP: (151-159)/() 152/96    Intake/Output Last 3 shifts:  No intake/output data recorded.     Physical Exam:    General Appearance:    Alert, cooperative, NAD   Back:     No CVA tenderness   Lungs:     Respirations unlabored, no wheezing    Heart:    RRR, intact peripheral pulses   Abdomen:     Soft, NDNT, no masses, no guarding   :    Pelvic not performed, bladder nondistended and nontender    Neuro/Psych:   Orientation intact, mood/affect pleasant       Results review:   I reviewed the patient's new clinical results.    Data review:  Lab Results (last 24 hours)       ** No results found for the last 24 hours. **             Imaging:  Imaging Results (Last 24 Hours)       ** No results found for the last 24 hours. **             Assessment:     Left renal calculus    Plan:     Left extracorporeal shockwave lithotripsy  Cystoscopy to evaluate interstitial cystitis    Curtis Victor MD  05/09/25  09:40 EDT        Curtis Victor MD  05/09/25  09:38 EDT

## 2025-05-10 NOTE — ED PROVIDER NOTES
EMERGENCY DEPARTMENT ENCOUNTER  Room Number:  11/11  PCP: Thomas Lino MD  Independent Historians: Patient and Family      HPI:  Chief Complaint: had concerns including Abdominal Pain and Difficulty Urinating.     A complete HPI/ROS/PMH/PSH/SH/FH are unobtainable due to: None    Chronic or social conditions impacting patient care (Social Determinants of Health): None      Context: Bonnie Syed is a 64 y.o. female with a medical history of kidney stones, rheumatoid arthritis who presents to the ED c/o acute urinary retention.  The patient reports that she had a lithotripsy today.  She states that after getting home she felt like she could not pass any urine.  She states she feels like she needs to urinate but she is not able to urinate.  After arriving here she has been able to urinate.  She reports some suprapubic tenderness.  She denies any flank tenderness.      Review of prior external notes (non-ED) -and- Review of prior external test results outside of this encounter:  Report dated today notes left shockwave lithotripsy with cystoscopy for left renal calculus.    Prescription drug monitoring program review:         PAST MEDICAL HISTORY  Active Ambulatory Problems     Diagnosis Date Noted    Rheumatoid arthritis involving both hands with positive rheumatoid factor 03/10/2017    Gastroesophageal reflux disease 03/10/2017    Essential hypertension 03/10/2017    Osteopenia 03/10/2017    Personal history of breast cancer 03/10/2017    Long-term use of immunosuppressant medication 08/20/2018    Closed displaced transverse fracture of shaft of left femur 11/07/2018    Prolapse of anterior vaginal wall 12/06/2018    Vaginal atrophy 12/06/2018    Toenail fungus 01/24/2019    Status post total replacement of hip 01/25/2020    Lichen sclerosus 12/12/2019    High cholesterol 11/04/2021    Renal calculus, right 12/17/2021    Wellness examination 02/10/2022    Hypercalcemia 09/07/2022    Rheumatoid arthritis 12/23/2014     Mixed hyperlipidemia 04/11/2023    Chronic interstitial cystitis 05/09/2025    Renal calculus, left 05/09/2025     Resolved Ambulatory Problems     Diagnosis Date Noted    Health care maintenance 03/10/2017    Acute cystitis with hematuria 03/17/2017    Vaginal yeast infection 08/07/2017    Rash 05/11/2018     Past Medical History:   Diagnosis Date    Allergic     Anxiety     Bladder stone     Breast cancer 2008    Bursitis of hip 2019    Cancer 2008    Fracture, femur 11/2018    Frequent UTI     GERD (gastroesophageal reflux disease)     Headache     History of cancer chemotherapy 2008    History of migraine     Hyperlipidemia     Hypertension     Interstitial cystitis     Juvenile arthritis     Knee swelling 1963    Reflux gastritis     Renal stone     Seborrheic dermatitis     Unsteady gait     Visual impairment     Weakness          PAST SURGICAL HISTORY  Past Surgical History:   Procedure Laterality Date    BREAST CAPSULOTOMY, IMPLANT REVISION Left     BREAST TISSUE EXPANDER REMOVAL INSERTION OF IMPLANT Left     BUNIONECTOMY Left     COLONOSCOPY      COLONOSCOPY N/A 07/18/2022    Procedure: COLONOSCOPY TO CECUM WITH COLD SNARE POLYPECTOMY;  Surgeon: Jamie Reyes MD;  Location: [facility] ENDOSCOPY;  Service: Gastroenterology;  Laterality: N/A;  PRE- SCREENING  POST- COLON POLYP    D & C HYSTEROSCOPY      39 years ago    DILATATION AND CURETTAGE      EXTRACORPOREAL SHOCK WAVE LITHOTRIPSY (ESWL) Right 12/17/2021    Procedure: RIGHT EXTRACORPOREAL SHOCKWAVE LITHOTRIPSY AND CYSTOSCOPY;  Surgeon: Curtis Victor MD;  Location: [facility] OR [facility];  Service: Urology;  Laterality: Right;    FEMUR IM NAILING/RODDING Left 11/08/2018    Procedure: FEMUR INTRAMEDULLARY NAILING/RODDING;  Surgeon: Samy Altamirano II, MD;  Location: [facility] MAIN OR;  Service: Orthopedics    FRACTURE SURGERY  Nov. 2018    Femur broke due to taking Boniva    HARDWARE REMOVAL Left     femur when hip replacment was done     HYSTERECTOMY      KIDNEY STONE SURGERY  2024    x2    MASTECTOMY Left 2008    MALIGNANT  lymph nodes    NIPPLE TATTOO      SALPINGECTOMY Right     tubal preg    TISSUE EXPANDER PLACEMENT  2008    TOTAL HIP ARTHROPLASTY Left 01/25/2020    Procedure: left TOTAL HIP arthroplasty, orif femur and removal of hardware;  Surgeon: Samy Altamirano II, MD;  Location: [facility];  Service: Orthopedics    VENOUS ACCESS DEVICE (PORT) INSERTION AND REMOVAL  2008         FAMILY HISTORY  Family History   Problem Relation Age of Onset    Hypertension Mother     Arthritis Mother     Hyperlipidemia Mother     Hypertension Father     Heart attack Maternal Grandfather     Breast cancer Neg Hx     Colon cancer Neg Hx     Malig Hyperthermia Neg Hx          SOCIAL HISTORY  Social History     Socioeconomic History    Marital status:    Tobacco Use    Smoking status: Never     Passive exposure: Never    Smokeless tobacco: Never   Vaping Use    Vaping status: Never Used   Substance and Sexual Activity    Alcohol use: No    Drug use: Never    Sexual activity: Yes     Partners: Male     Birth control/protection: Hysterectomy, Surgical         ALLERGIES  Ibandronate, Monistat [miconazole], Sulfa antibiotics, and Misc. sulfonamide containing compounds      REVIEW OF SYSTEMS  Review of Systems  Included in HPI  All systems reviewed and negative except for those discussed in HPI.      PHYSICAL EXAM    I have reviewed the triage vital signs and nursing notes.    ED Triage Vitals   Temp Heart Rate Resp BP SpO2   05/09/25 2042 05/09/25 2042 05/09/25 2042 05/09/25 2045 05/09/25 2042   99 °F (37.2 °C) (!) 165 24 (!) 168/107 98 %      Temp src Heart Rate Source Patient Position BP Location FiO2 (%)   -- -- 05/09/25 2045 05/09/25 2045 --     Sitting Right arm        Physical Exam  GENERAL: Awake, alert, no acute distress  SKIN: Warm, dry  HENT: Normocephalic, atraumatic  EYES: no scleral icterus  CV: regular rhythm, regular  rate  RESPIRATORY: normal effort, lungs clear  ABDOMEN: soft, nontender, nondistended  MUSCULOSKELETAL: no deformity  NEURO: alert, moves all extremities, follows commands            LAB RESULTS  Recent Results (from the past 24 hours)   ECG 12 Lead Tachycardia    Collection Time: 05/09/25  7:37 AM   Result Value Ref Range    QT Interval 360 ms    QTC Interval 535 ms   ECG 12 Lead Pre-Op / Pre-Procedure    Collection Time: 05/09/25  8:59 AM   Result Value Ref Range    QT Interval 335 ms    QTC Interval 440 ms   Urinalysis With Microscopic If Indicated (No Culture) - Urine, Clean Catch    Collection Time: 05/09/25  8:55 PM    Specimen: Urine, Clean Catch   Result Value Ref Range    Color, UA Other (A) Yellow, Straw    Appearance, UA Clear Clear    pH, UA 6.0 5.0 - 8.0    Specific Gravity, UA 1.011 1.005 - 1.030    Glucose,  mg/dL (1+) (A) Negative    Ketones, UA Negative Negative    Bilirubin, UA Negative Negative    Blood, UA Large (3+) (A) Negative    Protein,  mg/dL (2+) (A) Negative    Leuk Esterase, UA Trace (A) Negative    Nitrite, UA Negative Negative    Urobilinogen, UA 0.2 E.U./dL 0.2 - 1.0 E.U./dL   Urinalysis, Microscopic Only - Urine, Clean Catch    Collection Time: 05/09/25  8:55 PM    Specimen: Urine, Clean Catch   Result Value Ref Range    RBC, UA Too Numerous to Count (A) None Seen, 0-2 /HPF    WBC, UA 11-20 (A) None Seen, 0-2 /HPF    Bacteria, UA None Seen None Seen /HPF    Squamous Epithelial Cells, UA 0-2 None Seen, 0-2 /HPF    Hyaline Casts, UA 0-2 None Seen /LPF    Methodology Automated Microscopy    Comprehensive Metabolic Panel    Collection Time: 05/09/25  9:08 PM    Specimen: Blood   Result Value Ref Range    Glucose 143 (H) 65 - 99 mg/dL    BUN 17 8 - 23 mg/dL    Creatinine 0.98 0.57 - 1.00 mg/dL    Sodium 135 (L) 136 - 145 mmol/L    Potassium 4.5 3.5 - 5.2 mmol/L    Chloride 101 98 - 107 mmol/L    CO2 20.0 (L) 22.0 - 29.0 mmol/L    Calcium 10.2 8.6 - 10.5 mg/dL    Total Protein  7.8 6.0 - 8.5 g/dL    Albumin 5.0 3.5 - 5.2 g/dL    ALT (SGPT) 27 1 - 33 U/L    AST (SGOT) 32 1 - 32 U/L    Alkaline Phosphatase 80 39 - 117 U/L    Total Bilirubin 0.4 0.0 - 1.2 mg/dL    Globulin 2.8 gm/dL    A/G Ratio 1.8 g/dL    BUN/Creatinine Ratio 17.3 7.0 - 25.0    Anion Gap 14.0 5.0 - 15.0 mmol/L    eGFR 64.6 >60.0 mL/min/1.73   CBC Auto Differential    Collection Time: 05/09/25  9:08 PM    Specimen: Blood   Result Value Ref Range    WBC 7.35 3.40 - 10.80 10*3/mm3    RBC 5.06 3.77 - 5.28 10*6/mm3    Hemoglobin 14.1 12.0 - 15.9 g/dL    Hematocrit 42.4 34.0 - 46.6 %    MCV 83.8 79.0 - 97.0 fL    MCH 27.9 26.6 - 33.0 pg    MCHC 33.3 31.5 - 35.7 g/dL    RDW 12.9 12.3 - 15.4 %    RDW-SD 39.1 37.0 - 54.0 fl    MPV 10.6 6.0 - 12.0 fL    Platelets 270 140 - 450 10*3/mm3    Neutrophil % 80.9 (H) 42.7 - 76.0 %    Lymphocyte % 15.5 (L) 19.6 - 45.3 %    Monocyte % 3.1 (L) 5.0 - 12.0 %    Eosinophil % 0.1 (L) 0.3 - 6.2 %    Basophil % 0.1 0.0 - 1.5 %    Immature Grans % 0.3 0.0 - 0.5 %    Neutrophils, Absolute 5.94 1.70 - 7.00 10*3/mm3    Lymphocytes, Absolute 1.14 0.70 - 3.10 10*3/mm3    Monocytes, Absolute 0.23 0.10 - 0.90 10*3/mm3    Eosinophils, Absolute 0.01 0.00 - 0.40 10*3/mm3    Basophils, Absolute 0.01 0.00 - 0.20 10*3/mm3    Immature Grans, Absolute 0.02 0.00 - 0.05 10*3/mm3    nRBC 0.0 0.0 - 0.2 /100 WBC         RADIOLOGY  CT Abdomen Pelvis Without Contrast  Result Date: 5/9/2025  CT OF THE ABDOMEN PELVIS WITHOUT CONTRAST  HISTORY: Urinary symptoms. The patient underwent a lithotripsy today.  COMPARISON: None available.  TECHNIQUE: Axial CT imaging was obtained through the abdomen and pelvis. No IV contrast was administered.  FINDINGS: Images through the lung bases are clear. No suspicious hepatic lesions are seen. The spleen, adrenal glands, pancreas, stomach, and duodenum all appear normal. There is cholelithiasis. Bilateral nonobstructing renal stones are noted. There is mild bilateral hydronephrosis.  However, no definite obstructing lesion is seen. There are calcifications which are noted within the right hemipelvis however, I think these are vascular, rather than urinary stones. Full assessment of structures within the pelvis is degraded by streak artifact from a left hip arthroplasty. There is air in the urinary bladder, likely related to recent lithotripsy. The uterus is absent. Patient appears to have some pelvic floor laxity, with a cystocele noted. There is no bowel obstruction. There is no evidence of appendicitis. No acute osseous abnormalities are seen. There are aortoiliac calcifications.       1. Bilateral nonobstructing renal stones. 2. Mild bilateral hydronephrosis, of uncertain etiology. No obvious obstructing stones are seen. 3. Pelvic floor laxity, with cystocele noted. Air is within the urinary bladder, which may be related to recent instrumentation.  Radiation dose reduction techniques were utilized, including automated exposure control and exposure modulation based on body size.   This report was finalized on 5/9/2025 10:14 PM by Dr. Sanna Arteaga M.D on Workstation: [id number]          MEDICATIONS GIVEN IN ER  Medications   sodium chloride 0.9 % flush 10 mL (has no administration in time range)   sodium chloride 0.9 % bolus 1,000 mL (1,000 mL Intravenous New Bag 5/9/25 5536)         ORDERS PLACED DURING THIS VISIT:  Orders Placed This Encounter   Procedures    CT Abdomen Pelvis Without Contrast    Comprehensive Metabolic Panel    Urinalysis With Microscopic If Indicated (No Culture) - Urine, Clean Catch    CBC Auto Differential    Urinalysis, Microscopic Only - Urine, Clean Catch    Urology (on-call MD unless specified)    Insert Peripheral IV    CBC & Differential         OUTPATIENT MEDICATION MANAGEMENT:  Current Facility-Administered Medications Ordered in Epic   Medication Dose Route Frequency Provider Last Rate Last Admin    sodium chloride 0.9 % flush 10 mL  10 mL Intravenous PRN  Delmer Molina MD         Current Outpatient Medications Ordered in Epic   Medication Sig Dispense Refill    ALLERGY SERUM INJECTION Inject  under the skin into the appropriate area as directed 1 (One) Time Per Week. monday      Biotin 5000 MCG sublingual tablet Place 1 tablet under the tongue Daily.      busPIRone (BUSPAR) 10 MG tablet TAKE 1 TABLET BY MOUTH THREE TIMES A DAY (Patient taking differently: Take 1 tablet by mouth 3 (Three) Times a Day As Needed.) 270 tablet 1    calcium carbonate-vitamin d (CALTRATE 600+D) 600-400 MG-UNIT per tablet One tab po BID for osteopenia (Patient taking differently: Take 1 tablet by mouth 2 (Two) Times a Day.)      cephalexin (KEFLEX) 500 MG capsule Take 1 capsule by mouth 3 (Three) Times a Day for 10 doses. 10 capsule 0    clobetasol (TEMOVATE) 0.05 % ointment Apply 1 Application topically to the appropriate area as directed As Needed.      estradiol (ESTRACE) 0.1 MG/GM vaginal cream Insert 1 g into the vagina 2 (Two) Times a Week.      HYDROcodone-acetaminophen (NORCO) 5-325 MG per tablet Take 1 tablet by mouth Every 6 (Six) Hours As Needed (Pain). 12 tablet 0    inFLIXimab (REMICADE IV) Infuse  into a venous catheter Every 30 (Thirty) Days. Once a month for arthritis      levocetirizine (XYZAL) 5 MG tablet Take 1 tablet by mouth At Night As Needed.      montelukast (SINGULAIR) 10 MG tablet Take 1 tablet by mouth Daily.      Multiple Vitamin (MULTIVITAMIN) capsule Take 1 capsule by mouth Daily. HOLD PRIOR TO SURGERY      Multiple Vitamins-Minerals (Hair Skin & Nails) tablet Take 1 tablet by mouth Daily.      oxybutynin XL (Ditropan XL) 10 MG 24 hr tablet Take 1 tablet by mouth Daily. 30 tablet 2    rosuvastatin (CRESTOR) 10 MG tablet TAKE 1 TABLET BY MOUTH EVERY DAY AT NIGHT 90 tablet 2    valsartan (Diovan) 40 MG tablet Take 1 tablet by mouth Daily. 90 tablet 2         PROCEDURES  Procedures            PROGRESS, DATA ANALYSIS, CONSULTS, AND MEDICAL DECISION MAKING  All  labs have been independently interpreted by me.  All radiology studies have been reviewed by me. All EKG's have been independently viewed and interpreted by me.  Discussion below represents my analysis of pertinent findings related to patient's condition, differential diagnosis, treatment plan and final disposition.    Differential diagnosis includes but is not limited to urinary retention, kidney stone, hydronephrosis, bladder obstruction.    Clinical Scores:                                       ED Course as of 05/09/25 2257   Fri May 09, 2025   2214 CT Abdomen Pelvis Without Contrast  My independent interpretation of the imaging study is mild bladder distention, bilateral hydronephrosis [TR]   2215 Creatinine: 0.98 [TR]   2215 WBC: 7.35 [TR]   2215 Bacteria, UA: None Seen [TR]   2240 I reviewed the workup and findings with the patient and family at the bedside.  Answered all questions.  Her workup here is generally unremarkable for any acute process.  She is tachycardic but she states she always gets tachycardic when she goes to the doctor.  She states that is nothing new.  I have a call out to urology to discuss but I expect to be able to discharge her home.  She is agreeable to the plan. [TR]   2241 CT Abdomen Pelvis Without Contrast [TR]   2253 Discussing with Dr Zhang with urology. She is okay to go home and follow up as scheduled unless symptoms worsen or change. [TR]   2257 I reviewed the workup and findings with the patient and family.  They are agreeable to the plan of discharge. [TR]      ED Course User Index  [TR] Delmer Molina MD             AS OF 22:57 EDT VITALS:    BP - (!) 161/105  HR - (!) 122  TEMP - 99 °F (37.2 °C)  O2 SATS - 99%    COMPLEXITY OF CARE  Admission was considered but after careful review of the patient's presentation, physical examination, diagnostic results, and response to treatment the patient may be safely discharged with outpatient follow-up.      DIAGNOSIS  Final  diagnoses:   Acute abdominal pain   Post-operative state         DISPOSITION  ED Disposition       ED Disposition   Discharge    Condition   Stable    Comment   --                Please note that portions of this document were completed with a voice recognition program.    Note Disclaimer: At [facility], we believe that sharing information builds trust and better relationships. You are receiving this note because you recently visited [facility]. It is possible you will see health information before a provider has talked with you about it. This kind of information can be easy to misunderstand. To help you fully understand what it means for your health, we urge you to discuss this note with your provider.         Delmer Molina MD  05/09/25 2108       Delmer Molina MD  05/09/25 1412

## 2025-05-10 NOTE — DISCHARGE INSTRUCTIONS
Return immediately if you are unable to urinate.  Follow-up with the urologist for further evaluation.  Return immediately for any nausea or vomiting or fevers.

## 2025-05-12 LAB
QT INTERVAL: 335 MS
QT INTERVAL: 360 MS
QTC INTERVAL: 440 MS
QTC INTERVAL: 535 MS

## 2025-05-23 ENCOUNTER — OFFICE VISIT (OUTPATIENT)
Dept: CARDIOLOGY | Age: 65
End: 2025-05-23
Payer: COMMERCIAL

## 2025-05-23 VITALS
HEIGHT: 64 IN | WEIGHT: 125.6 LBS | BODY MASS INDEX: 21.44 KG/M2 | DIASTOLIC BLOOD PRESSURE: 98 MMHG | HEART RATE: 111 BPM | SYSTOLIC BLOOD PRESSURE: 164 MMHG

## 2025-05-23 DIAGNOSIS — I44.7 LBBB (LEFT BUNDLE BRANCH BLOCK): ICD-10-CM

## 2025-05-23 DIAGNOSIS — I47.10 PAROXYSMAL SVT (SUPRAVENTRICULAR TACHYCARDIA): ICD-10-CM

## 2025-05-23 DIAGNOSIS — E78.2 MIXED HYPERLIPIDEMIA: ICD-10-CM

## 2025-05-23 DIAGNOSIS — I10 ESSENTIAL HYPERTENSION: Primary | Chronic | ICD-10-CM

## 2025-05-23 RX ORDER — VALSARTAN 80 MG/1
80 TABLET ORAL DAILY
Qty: 90 TABLET | Refills: 2 | Status: SHIPPED | OUTPATIENT
Start: 2025-05-23

## 2025-05-23 NOTE — PROGRESS NOTES
"[facility]      Patient Name: Bonnie Syed  :1960  Age: 64 y.o.  Encounter Provider:  Marco Bar MD      Chief Complaint:   Chief Complaint   Patient presents with    Rapid Heart Rate         HPI  Bonnie Syed is a 64 y.o. female who presents today for follow-up. Pt has a  history significant for hypertension, hyperlipidemia, GERD, RA, kidney stones.  She recently had a lithotripsy done.  Prior to the procedure patient was tachycardic with a rate in the 150s.  She had a rate dependent bundle branch block.  There was concern for an atrial arrhythmia but was difficult to tell on telemetry due to bundle branch block.  He is very anxious and has had issues with tachycardia in doctors offices before.  She otherwise was asymptomatic.  She tolerated the procedure fine and was discharged with 1 week Holter for further evaluation.  That is still pending.    She is again very anxious to be at the doctor's office.  She has no cardiovascular complaints.  She denies any chest pain, shortness of breath, lower extremity edema.  She has a lot of personal stressors going on right now.      The following portions of the patient's history were reviewed and updated as appropriate: allergies, current medications, past family history, past medical history, past social history, past surgical history and problem list.      Previous Cardiac Testing:  Holter monitor pending    OBJECTIVE:   Vital Signs  Vitals:    25 1144   BP: 164/98   Pulse: 111     Estimated body mass index is 21.56 kg/m² as calculated from the following:    Height as of this encounter: 162.6 cm (64\").    Weight as of this encounter: 57 kg (125 lb 9.6 oz).    Constitutional:       Appearance: Healthy appearance. Not in distress.   Neck:      Vascular: JVD normal.   Pulmonary:      Effort: Pulmonary effort is normal.      Breath sounds: Normal breath sounds. No wheezing. No rhonchi. No rales.   Cardiovascular:      PMI at left " midclavicular line. Normal rate. Regular rhythm. Normal S1. Normal S2.       Murmurs: There is no murmur.      No gallop.  No click. No rub.   Pulses:     Intact distal pulses.   Edema:     Peripheral edema absent.   Abdominal:      Palpations: Abdomen is soft.      Tenderness: There is no abdominal tenderness.   Musculoskeletal: Normal range of motion. Skin:     General: Skin is warm and dry.   Neurological:      General: No focal deficit present.      Mental Status: Alert and oriented to person, place and time.           ECG 12 Lead    Date/Time: 5/23/2025 12:03 PM  Performed by: Marco Bar MD    Authorized by: Marco Bar MD  Comparison: compared with previous ECG from 5/9/2025  Similar to previous ECG  Rhythm: sinus rhythm and sinus tachycardia  Rate: tachycardic  Conduction: conduction normal  ST Segments: ST segments normal  T Waves: T waves normal  QRS axis: normal  Other findings: poor R wave progression    Clinical impression: non-specific ECG          Lipid Panel          10/8/2024    08:14   Lipid Panel   Total Cholesterol 133    Triglycerides 123    HDL Cholesterol 64    VLDL Cholesterol 21    LDL Cholesterol  48         BUN   Date Value Ref Range Status   05/09/2025 17 8 - 23 mg/dL Final     Creatinine   Date Value Ref Range Status   05/09/2025 0.98 0.57 - 1.00 mg/dL Final     Potassium   Date Value Ref Range Status   05/09/2025 4.5 3.5 - 5.2 mmol/L Final     ALT (SGPT)   Date Value Ref Range Status   05/09/2025 27 1 - 33 U/L Final     AST (SGOT)   Date Value Ref Range Status   05/09/2025 32 1 - 32 U/L Final           ASSESSMENT:      Diagnosis Plan   1. Essential hypertension  ECG 12 Lead      2. Mixed hyperlipidemia  ECG 12 Lead      3. Paroxysmal SVT (supraventricular tachycardia)  ECG 12 Lead      4. LBBB (left bundle branch block)  ECG 12 Lead            PLAN OF CARE:     Left bundle branch block, rate related  Possible SVT preoperatively versus sinus tach with bundle branch  block  Holter monitor has not been returned.  Hypertension: Poorly controlled in office today.  Increase valsartan to 80 mg.  Will follow-up with PCP.  Hyperlipidemia: Well-controlled on Crestor    Return to clinic in 1 year, sooner if needed based on pending Holter monitor         Discharge Medications            Accurate as of May 23, 2025 12:30 PM. If you have any questions, ask your nurse or doctor.                Changes to Medications        Instructions Start Date   busPIRone 10 MG tablet  Commonly known as: BUSPAR  What changed:   when to take this  reasons to take this   10 mg, Oral, 3 Times Daily      calcium carbonate-vitamin d 600-400 MG-UNIT per tablet  Commonly known as: Caltrate 600+D  What changed:   how much to take  how to take this  when to take this  additional instructions   One tab po BID for osteopenia      valsartan 80 MG tablet  Commonly known as: Diovan  What changed:   medication strength  how much to take  Changed by: Marco Bar   80 mg, Oral, Daily             Continue These Medications        Instructions Start Date   ALLERGY SERUM INJECTION   Weekly      Biotin 5000 MCG sublingual tablet   1 tablet, Daily      clobetasol 0.05 % ointment  Commonly known as: TEMOVATE   1 Application, As Needed      estradiol 0.1 MG/GM vaginal cream  Commonly known as: ESTRACE   1 g, 2 Times Weekly      Hair Skin & Nails tablet   1 tablet, Daily      HYDROcodone-acetaminophen 5-325 MG per tablet  Commonly known as: NORCO   1 tablet, Oral, Every 6 Hours PRN      levocetirizine 5 MG tablet  Commonly known as: XYZAL   1 tablet, Nightly PRN      montelukast 10 MG tablet  Commonly known as: SINGULAIR   10 mg, Daily      multivitamin capsule   1 capsule, Daily      oxybutynin XL 10 MG 24 hr tablet  Commonly known as: Ditropan XL   10 mg, Oral, Daily      REMICADE IV   Every 30 Days      rosuvastatin 10 MG tablet  Commonly known as: CRESTOR   10 mg, Oral, Every Night at Bedtime               Thank you for  allowing me to participate in the care of your patient,      Sincerely,   Marco Bar MD  [facility]  05/23/25  12:30 EDT

## 2025-05-29 LAB
CV ZIO BASELINE AVG BPM: 93 BPM
CV ZIO BASELINE BPM HIGH: 188 BPM
CV ZIO BASELINE BPM LOW: 60 BPM
CV ZIO DEVICE ANALYSIS TIME: NORMAL
CV ZIO ECT SVE COUNT: 235 EPISODES
CV ZIO ECT SVE CPLT COUNT: 28 EPISODES
CV ZIO ECT SVE CPLT FREQ: NORMAL
CV ZIO ECT SVE FREQ: NORMAL
CV ZIO ECT SVE TPLT COUNT: 18 EPISODES
CV ZIO ECT SVE TPLT FREQ: NORMAL
CV ZIO ECT VE COUNT: 672 EPISODES
CV ZIO ECT VE CPLT COUNT: 0 EPISODES
CV ZIO ECT VE CPLT FREQ: 0
CV ZIO ECT VE FREQ: NORMAL
CV ZIO ECT VE TPLT COUNT: 0 EPISODES
CV ZIO ECT VE TPLT FREQ: 0
CV ZIO ECTOPIC SVE COUPLET RAW PERCENT: 0.01 %
CV ZIO ECTOPIC SVE ISOLATED PERCENT: 0.03 %
CV ZIO ECTOPIC SVE TRIPLET RAW PERCENT: 0.01 %
CV ZIO ECTOPIC VE COUPLET RAW PERCENT: 0 %
CV ZIO ECTOPIC VE ISOLATED PERCENT: 0.08 %
CV ZIO ECTOPIC VE TRIPLET RAW PERCENT: 0 %
CV ZIO ENROLLMENT END: NORMAL
CV ZIO ENROLLMENT START: NORMAL
CV ZIO PATIENT EVENTS DIARIES: 5
CV ZIO PATIENT EVENTS TRIGGERS: 1
CV ZIO PAUSE COUNT: 0
CV ZIO PRESCRIPTION STATUS: NORMAL
CV ZIO SVT AVG BPM: 121 BPM
CV ZIO SVT BPM HIGH: 188 BPM
CV ZIO SVT BPM LOW: 92 BPM
CV ZIO SVT COUNT: 10
CV ZIO SVT F EPI AVG BPM: 179 BPM
CV ZIO SVT F EPI BEATS: 5 BEATS
CV ZIO SVT F EPI BPM HIGH: 188 BPM
CV ZIO SVT F EPI BPM LOW: 171 BPM
CV ZIO SVT F EPI DUR: 1.5 SEC
CV ZIO SVT F EPI END: NORMAL
CV ZIO SVT F EPI START: NORMAL
CV ZIO SVT L EPI AVG BPM: 128 BPM
CV ZIO SVT L EPI BEATS: 11 BEATS
CV ZIO SVT L EPI BPM HIGH: 141 BPM
CV ZIO SVT L EPI BPM LOW: 115 BPM
CV ZIO SVT L EPI DUR: 4.8 SEC
CV ZIO SVT L EPI END: NORMAL
CV ZIO SVT L EPI START: NORMAL
CV ZIO TOTAL  ENROLLMENT PERIOD: NORMAL
CV ZIO VT COUNT: 0

## 2025-05-30 ENCOUNTER — RESULTS FOLLOW-UP (OUTPATIENT)
Dept: PERIOP | Facility: HOSPITAL | Age: 65
End: 2025-05-30
Payer: COMMERCIAL

## 2025-05-30 ENCOUNTER — TELEPHONE (OUTPATIENT)
Dept: CARDIOLOGY | Age: 65
End: 2025-05-30
Payer: COMMERCIAL

## 2025-05-30 NOTE — TELEPHONE ENCOUNTER
Pt called in to triage this morning asking about holter monitor results.  She states that she see's report in mychart, but doesn't understand it.  Is there anything I can share with pt about results?    Thanks so much,  Peace Ngo, ALISIA  Triage RN  05/30/25 09:01 EDT

## 2025-06-03 RX ORDER — METOPROLOL SUCCINATE 50 MG/1
50 TABLET, EXTENDED RELEASE ORAL DAILY
Qty: 90 TABLET | Refills: 3 | Status: SHIPPED | OUTPATIENT
Start: 2025-06-03

## 2025-06-04 ENCOUNTER — OFFICE VISIT (OUTPATIENT)
Dept: INTERNAL MEDICINE | Facility: CLINIC | Age: 65
End: 2025-06-04
Payer: COMMERCIAL

## 2025-06-04 VITALS
OXYGEN SATURATION: 98 % | HEIGHT: 64 IN | BODY MASS INDEX: 21.41 KG/M2 | SYSTOLIC BLOOD PRESSURE: 143 MMHG | DIASTOLIC BLOOD PRESSURE: 81 MMHG | WEIGHT: 125.4 LBS | HEART RATE: 91 BPM

## 2025-06-04 DIAGNOSIS — E78.2 MIXED HYPERLIPIDEMIA: ICD-10-CM

## 2025-06-04 DIAGNOSIS — F41.9 ANXIETY: ICD-10-CM

## 2025-06-04 DIAGNOSIS — I10 ESSENTIAL HYPERTENSION: Primary | Chronic | ICD-10-CM

## 2025-06-04 DIAGNOSIS — N20.0 KIDNEY STONES: ICD-10-CM

## 2025-06-04 PROBLEM — E78.00 HIGH CHOLESTEROL: Status: RESOLVED | Noted: 2021-11-04 | Resolved: 2025-06-04

## 2025-06-04 PROBLEM — Z00.00 WELLNESS EXAMINATION: Status: RESOLVED | Noted: 2022-02-10 | Resolved: 2025-06-04

## 2025-06-04 PROCEDURE — 99214 OFFICE O/P EST MOD 30 MIN: CPT | Performed by: STUDENT IN AN ORGANIZED HEALTH CARE EDUCATION/TRAINING PROGRAM

## 2025-06-04 RX ORDER — BUSPIRONE HYDROCHLORIDE 5 MG/1
5 TABLET ORAL 2 TIMES DAILY
Qty: 180 TABLET | Refills: 0 | Status: SHIPPED | OUTPATIENT
Start: 2025-06-04

## 2025-06-04 NOTE — ASSESSMENT & PLAN NOTE
Blood pressure under good control today.  Continue current management with metoprolol and valsartan.  Valsartan increased by cardiology  Benign Holter  LBBB and concern for SVT by cardiology  Thinks anxiety is contributing  Maintain valsartan 80 mg

## 2025-06-04 NOTE — ASSESSMENT & PLAN NOTE
Lipid abnormalities are stable  The 10-year ASCVD risk score (Lewis DESAI, et al., 2019) is: 6.2%    Values used to calculate the score:      Age: 64 years      Sex: Female      Is Non- : No      Diabetic: No      Tobacco smoker: No      Systolic Blood Pressure: 143 mmHg      Is BP treated: Yes      HDL Cholesterol: 64 mg/dL      Total Cholesterol: 133 mg/dL  Continue rosuvastatin

## 2025-06-04 NOTE — PROGRESS NOTES
Thomas Lino M.D.  Internal Medicine  [facility]  [address], [address]  [place] [address]  [phone]      Chief Complaint  Follow-up (6 mth follow up /)    ASHLYN Syed is a 64 y.o. female with rheumatoid arthritis, anxiety, allergies, hyperlipidemia, hypertension who presents to the office today as an established patient that last saw me on 12/6/2024.     History of Present Illness  The patient came in for a follow-up on their high blood pressure, rapid heartbeat, and anxiety.    They had a lithotripsy procedure on 05/09/2025, and their blood pressure was high before the procedure. Their home blood pressure readings have been between 97/75 and 121/76. They are currently taking valsartan 80 mg, which was doubled by their cardiologist.    The patient has been experiencing an irregular heart rate. Their cardiologist did two EKGs and recommended wearing a heart monitor. They were diagnosed with left bundle branch block and possible SVT. They started taking metoprolol 50 mg today and have noticed fewer episodes of rapid heartbeats. They feel that these episodes might be related to their anxiety. They will follow up with their cardiologist in a year.    The patient has had white coat syndrome since childhood, which they believe contributes to their current symptoms. Their anxiety comes and goes, and they are currently taking BuSpar as needed, not three times a day as prescribed, because it makes them feel lightheaded. Family issues have been making their anxiety worse. They are thinking about taking BuSpar once a day instead. They are also worried about potential hair loss from BuSpar.    The patient is scheduled for another lithotripsy on 06/13/2025 to remove a remaining stone on the right side.    Review of Systems   Constitutional:  Negative for chills, diaphoresis, fatigue and fever.   HENT:  Negative for congestion and sore throat.    Respiratory:  Negative for cough.   "  Cardiovascular:  Negative for chest pain.   Gastrointestinal:  Negative for abdominal pain, anorexia, nausea and vomiting.   Genitourinary:  Negative for dysuria.   Musculoskeletal:  Negative for joint pain, myalgias and neck pain.   Skin:  Negative for rash.   Neurological:  Positive for headaches. Negative for vertigo, weakness and numbness.     Allergies   Allergen Reactions    Ibandronate Other (See Comments)     FRACTURED LEFT FEMUR        Monistat [Miconazole] Other (See Comments)     Pt states she gets red and feels like she is \"BURNING.\"    Sulfa Antibiotics Hives and Swelling    Misc. Sulfonamide Containing Compounds Rash        Outpatient Medications Marked as Taking for the 6/4/25 encounter (Office Visit) with Thomas Lino MD   Medication Sig Dispense Refill    ALLERGY SERUM INJECTION Inject  under the skin into the appropriate area as directed 1 (One) Time Per Week. monday      Biotin 5000 MCG sublingual tablet Place 1 tablet under the tongue Daily.      busPIRone (BUSPAR) 10 MG tablet TAKE 1 TABLET BY MOUTH THREE TIMES A DAY (Patient taking differently: Take 1 tablet by mouth 3 (Three) Times a Day As Needed.) 270 tablet 1    calcium carbonate-vitamin d (CALTRATE 600+D) 600-400 MG-UNIT per tablet One tab po BID for osteopenia (Patient taking differently: Take 1 tablet by mouth 2 (Two) Times a Day.)      clobetasol (TEMOVATE) 0.05 % ointment Apply 1 Application topically to the appropriate area as directed As Needed.      estradiol (ESTRACE) 0.1 MG/GM vaginal cream Insert 1 g into the vagina 2 (Two) Times a Week.      inFLIXimab (REMICADE IV) Infuse  into a venous catheter Every 30 (Thirty) Days. Once a month for arthritis      levocetirizine (XYZAL) 5 MG tablet Take 1 tablet by mouth At Night As Needed.      metoprolol succinate XL (TOPROL-XL) 50 MG 24 hr tablet Take 1 tablet by mouth Daily. 90 tablet 3    montelukast (SINGULAIR) 10 MG tablet Take 1 tablet by mouth Daily.      Multiple Vitamin " (MULTIVITAMIN) capsule Take 1 capsule by mouth Daily. HOLD PRIOR TO SURGERY      Multiple Vitamins-Minerals (Hair Skin & Nails) tablet Take 1 tablet by mouth Daily.      rosuvastatin (CRESTOR) 10 MG tablet TAKE 1 TABLET BY MOUTH EVERY DAY AT NIGHT 90 tablet 2    valsartan (Diovan) 80 MG tablet Take 1 tablet by mouth Daily. 90 tablet 2        Past Medical History:   Diagnosis Date    Allergic     takes 2 allergy injections weekly    Anxiety     Bladder stone     Breast cancer 2008    left-- had a mastectomy    Bursitis of hip 2019    Cancer 2008    left breast  with lymph node dissection  had reconstruction    Fracture, femur 11/2018    Frequent UTI     GERD (gastroesophageal reflux disease)     Headache     Sinus headaches, migraines about once a year    History of cancer chemotherapy 2008    History of migraine     Hyperlipidemia     Hypertension     Interstitial cystitis     Juvenile arthritis     age 3 in left knee    Knee swelling 1963    Osteopenia     Reflux gastritis     Renal stone     RIGHT DEC 2021    Rheumatoid arthritis     on monthly injection    Seborrheic dermatitis     SCLAP AREA-DRY ITCHY SKIN    Unsteady gait     LEFT HIP    Visual impairment     Reading Glasses while on PC    Weakness     LEFT HIP     Past Surgical History:   Procedure Laterality Date    BREAST CAPSULOTOMY, IMPLANT REVISION Left     BREAST TISSUE EXPANDER REMOVAL INSERTION OF IMPLANT Left     BUNIONECTOMY Left     COLONOSCOPY      COLONOSCOPY N/A 07/18/2022    Procedure: COLONOSCOPY TO CECUM WITH COLD SNARE POLYPECTOMY;  Surgeon: Jamie Reyes MD;  Location: [facility] ENDOSCOPY;  Service: Gastroenterology;  Laterality: N/A;  PRE- SCREENING  POST- COLON POLYP    D & C HYSTEROSCOPY      39 years ago    DILATATION AND CURETTAGE      EXTRACORPOREAL SHOCK WAVE LITHOTRIPSY (ESWL) Right 12/17/2021    Procedure: RIGHT EXTRACORPOREAL SHOCKWAVE LITHOTRIPSY AND CYSTOSCOPY;  Surgeon: Curtis Victor MD;  [place] [facility] [place] [facility]   "Service: Urology;  Laterality: Right;    EXTRACORPOREAL SHOCKWAVE LITHOTRIPSY (ESWL), STENT INSERTION/REMOVAL Left 5/9/2025    Procedure: LEFT SHOCKWAVE LITHOTRIPSY WITH CYSTOSCOPY FOR LEFT RENAL CALCULUS;  Surgeon: Curtis Victor MD;  Location: [facility] OR;  Service: Urology;  Laterality: Left;    FEMUR IM NAILING/RODDING Left 11/08/2018    Procedure: FEMUR INTRAMEDULLARY NAILING/RODDING;  Surgeon: Samy Altamirano II, MD;  Location: [facility] MAIN OR;  Service: Orthopedics    FRACTURE SURGERY  Nov. 2018    Femur broke due to taking Boniva    HARDWARE REMOVAL Left     femur when hip replacment was done    HYSTERECTOMY      KIDNEY STONE SURGERY  2024    x2    MASTECTOMY Left 2008    MALIGNANT  lymph nodes    NIPPLE TATTOO      SALPINGECTOMY Right     tubal preg    TISSUE EXPANDER PLACEMENT  2008    TOTAL HIP ARTHROPLASTY Left 01/25/2020    Procedure: left TOTAL HIP arthroplasty, orif femur and removal of hardware;  Surgeon: Samy Altamirano II, MD;  Location: [facility] OR;  Service: Orthopedics    VENOUS ACCESS DEVICE (PORT) INSERTION AND REMOVAL  2008     Family History   Problem Relation Age of Onset    Hypertension Mother     Arthritis Mother     Hyperlipidemia Mother     Hypertension Father     Heart attack Maternal Grandfather     Breast cancer Neg Hx     Colon cancer Neg Hx     Malig Hyperthermia Neg Hx     reports that she has never smoked. She has never been exposed to tobacco smoke. She has never used smokeless tobacco. She reports that she does not drink alcohol and does not use drugs.    OBJECTIVE    Vital Signs:   /81   Pulse 91   Ht 162.6 cm (64.02\")   Wt 56.9 kg (125 lb 6.4 oz)   SpO2 98%   BMI 21.51 kg/m²     Physical Exam     Physical Exam  General Appearance: Normal.  HEENT: Within normal limits.  Respiratory: CTA, no wheezing, rales, or rhonchi.  Cardiovascular: RRR, no murmurs, rubs, or gallops.  Skin: Warm and dry, no rash.  Neurological: Grossly intact.    The " following data was reviewed by: Thomas Lino MD on 06/04/2025:  CMP          10/8/2024    08:14 4/21/2025    15:45 5/9/2025    21:08   CMP   Glucose 94  91  143    BUN 13  14  17    Creatinine 0.82  0.79  0.98    EGFR 80.0  83.6  64.6    Sodium 141  139  135    Potassium 4.8  3.9  4.5    Chloride 103  104  101    Calcium 10.2  10.1  10.2    Total Protein 7.5   7.8    Albumin 5.2   5.0    Globulin 2.3   2.8    Total Bilirubin 0.5   0.4    Alkaline Phosphatase 86   80    AST (SGOT) 26   32    ALT (SGPT) 21   27    Albumin/Globulin Ratio 2.3   1.8    BUN/Creatinine Ratio 15.9  17.7  17.3    Anion Gap  9.0  14.0      CBC w/diff          10/8/2024    08:14 4/21/2025    15:45 5/9/2025    21:08   CBC w/Diff   WBC 8.59  8.41  7.35    RBC 5.44  4.69  5.06    Hemoglobin 15.4  12.7  14.1    Hematocrit 47.1  39.4  42.4    MCV 86.6  84.0  83.8    MCH 28.3  27.1  27.9    MCHC 32.7  32.2  33.3    RDW 12.7  12.8  12.9    Platelets 268  239  270    Neutrophil Rel % 57.6  57.7  80.9    Immature Granulocyte Rel %  0.1  0.3    Lymphocyte Rel % 29.7  29.0  15.5    Monocyte Rel % 7.7  8.0  3.1    Eosinophil Rel % 4.1  4.6  0.1    Basophil Rel % 0.7  0.6  0.1      Lipid Panel          10/8/2024    08:14   Lipid Panel   Total Cholesterol 133    Triglycerides 123    HDL Cholesterol 64    VLDL Cholesterol 21    LDL Cholesterol  48          Data reviewed : recent Cardiology note         CT Abdomen Pelvis Without Contrast (05/09/2025 21:50)     ASSESSMENT & PLAN        Essential hypertension  Blood pressure under good control today.  Continue current management with metoprolol and valsartan.  Valsartan increased by cardiology  Benign Holter  LBBB and concern for SVT by cardiology  Thinks anxiety is contributing  Maintain valsartan 80 mg         Mixed hyperlipidemia   Lipid abnormalities are stable  The 10-year ASCVD risk score (Lewis DESAI, et al., 2019) is: 6.2%    Values used to calculate the score:      Age: 64 years      Sex: Female      Is  Non- : No      Diabetic: No      Tobacco smoker: No      Systolic Blood Pressure: 143 mmHg      Is BP treated: Yes      HDL Cholesterol: 64 mg/dL      Total Cholesterol: 133 mg/dL  Continue rosuvastatin          Kidney stones  Has upcoming lithotripsy       Anxiety  Takes Buspar PRN  More stress recently  Lightheaded with Buspar  Consider SSRI  Adjusted BuSpar to 5 mg BID due to lightheadedness with higher doses   Orders:    busPIRone (BUSPAR) 5 MG tablet; Take 1 tablet by mouth 2 (Two) Times a Day.      Assessment & Plan        Health Maintenance Due   Topic Date Due    Pneumococcal Vaccine 50+ (2 of 2 - PCV) 11/18/2021    COVID-19 Vaccine (4 - 2024-25 season) 09/01/2024        Follow Up  No follow-ups on file.    Patient/family had no further questions at this time and verbalized understanding of the plan discussed today.     Patient or patient representative verbalized consent for the use of Ambient Listening during the visit with  Thomas Lino MD for chart documentation. 6/5/2025  19:58 EDT

## 2025-06-06 ENCOUNTER — PRE-ADMISSION TESTING (OUTPATIENT)
Dept: PREADMISSION TESTING | Facility: HOSPITAL | Age: 65
End: 2025-06-06
Payer: COMMERCIAL

## 2025-06-06 VITALS
WEIGHT: 124.5 LBS | BODY MASS INDEX: 21.25 KG/M2 | OXYGEN SATURATION: 98 % | DIASTOLIC BLOOD PRESSURE: 66 MMHG | RESPIRATION RATE: 18 BRPM | HEART RATE: 95 BPM | TEMPERATURE: 98.4 F | HEIGHT: 64 IN | SYSTOLIC BLOOD PRESSURE: 114 MMHG

## 2025-06-06 LAB
ANION GAP SERPL CALCULATED.3IONS-SCNC: 9.4 MMOL/L (ref 5–15)
BUN SERPL-MCNC: 21 MG/DL (ref 8–23)
BUN/CREAT SERPL: 20.2 (ref 7–25)
CALCIUM SPEC-SCNC: 10.2 MG/DL (ref 8.6–10.5)
CHLORIDE SERPL-SCNC: 103 MMOL/L (ref 98–107)
CO2 SERPL-SCNC: 26.6 MMOL/L (ref 22–29)
CREAT SERPL-MCNC: 1.04 MG/DL (ref 0.57–1)
DEPRECATED RDW RBC AUTO: 38.6 FL (ref 37–54)
EGFRCR SERPLBLD CKD-EPI 2021: 60.1 ML/MIN/1.73
ERYTHROCYTE [DISTWIDTH] IN BLOOD BY AUTOMATED COUNT: 12.5 % (ref 12.3–15.4)
GLUCOSE SERPL-MCNC: 122 MG/DL (ref 65–99)
HCT VFR BLD AUTO: 43.8 % (ref 34–46.6)
HGB BLD-MCNC: 14.7 G/DL (ref 12–15.9)
MCH RBC QN AUTO: 28.7 PG (ref 26.6–33)
MCHC RBC AUTO-ENTMCNC: 33.6 G/DL (ref 31.5–35.7)
MCV RBC AUTO: 85.4 FL (ref 79–97)
PLATELET # BLD AUTO: 251 10*3/MM3 (ref 140–450)
PMV BLD AUTO: 10.6 FL (ref 6–12)
POTASSIUM SERPL-SCNC: 4.7 MMOL/L (ref 3.5–5.2)
RBC # BLD AUTO: 5.13 10*6/MM3 (ref 3.77–5.28)
SODIUM SERPL-SCNC: 139 MMOL/L (ref 136–145)
WBC NRBC COR # BLD AUTO: 9.17 10*3/MM3 (ref 3.4–10.8)

## 2025-06-06 PROCEDURE — 36415 COLL VENOUS BLD VENIPUNCTURE: CPT

## 2025-06-06 PROCEDURE — 85027 COMPLETE CBC AUTOMATED: CPT

## 2025-06-06 PROCEDURE — 80048 BASIC METABOLIC PNL TOTAL CA: CPT

## 2025-06-06 NOTE — DISCHARGE INSTRUCTIONS
Take the following medications the morning of surgery with a small sip of water:  METOPROLOL, BUSPIRONE    If you are on an Aspirin or a Blood Thinner please clarify with the surgeon and prescribing physician if and when you are to hold the medication or if you are to continue the medication.    If you are on prescription narcotic pain medication to control your pain you may also take that medication the morning of surgery.    General Instructions:  Do not eat or drink anything after midnight the night before surgery.  Infants may have breast milk up to four hours before surgery.  Infants drinking formula may drink formula up to six hours before surgery.   Patients who avoid smoking, chewing tobacco and alcohol for 4 weeks prior to surgery have a reduced risk of post-operative complications.  Quit smoking as many days before surgery as you can.  Do not smoke, use chewing tobacco or drink alcohol the day of surgery.   If applicable bring your C-PAP/ BI-PAP machine in with you to preop day of surgery.  Bring any papers given to you in the doctor’s office.  Wear clean comfortable clothes.  Do not wear contact lenses, false eyelashes or make-up.  Bring a case for your glasses.   Bring crutches or walker if applicable.  Remove all piercings.  Leave jewelry and any other valuables at home.  Hair extensions with metal clips must be removed prior to surgery.  The Pre-Admission Testing nurse will instruct you to bring medications if unable to obtain an accurate list in Pre-Admission Testing.    Day of surgery you will need to let the preoperative nurse know the last time you took each of your medications.  To ensure a safe environment for patients and staff, we kindly ask that children under the age of 16 not accompany patients.  If you must bring a dependent child or dependent adult please ensure a responsible adult, other than yourself, is present to supervise them.      If you were given a blood bank ID arm band remember  to bring it with you the day of surgery.    Preventing a Surgical Site Infection:  For 2 to 3 days before surgery, avoid shaving with a razor because the razor can irritate skin and make it easier to develop an infection.    Any areas of open skin can increase the risk of a post-operative wound infection by allowing bacteria to enter and travel throughout the body.  Notify your surgeon if you have any skin wounds / rashes even if it is not near the expected surgical site.  The area will need assessed to determine if surgery should be delayed until it is healed.  The night prior to surgery shower using a fresh bar of anti-bacterial soap (such as Dial) and clean washcloth.  Sleep in a clean bed with clean clothing.  Do not allow pets to sleep with you.  Shower on the morning of surgery using a fresh bar of anti-bacterial soap (such as Dial) and clean washcloth.  Dry with a clean towel and dress in clean clothing.  Ask your surgeon if you will be receiving antibiotics prior to surgery.  Make sure you, your family, and all healthcare providers clean their hands with soap and water or an alcohol based hand  before caring for you or your wound.    Day of surgery:  Your arrival time is approximately two hours before your scheduled surgery time.  Please note if you have an early arrival time the surgery doors do not open before 5:00 AM.  Upon arrival, a Pre-op nurse and Anesthesiologist will review your health history, obtain vital signs, and answer questions you may have.  The only belongings needed at this time will be your home medications and if applicable your C-PAP/BI-PAP machine.  A Pre-op nurse will start an IV and you may receive medication in preparation for surgery, including something to help you relax.      Please be aware that surgery does come with discomfort.  We want to make every effort to control your discomfort so please discuss any uncontrolled symptoms with your nurse.   Your doctor will most  likely have prescribed pain medications.      If you are going home after surgery you will receive individualized written care instructions before being discharged.  A responsible adult must drive you to and from the hospital on the day of your surgery and ideally stay with you through the night.  Discharge prescriptions can be filled by the hospital pharmacy during regular pharmacy hours.  If you are having surgery late in the day/evening your prescription may be e-prescribed to your pharmacy.  Please verify your pharmacy hours or chose a 24 hour pharmacy to avoid not having access to your prescription because your pharmacy has closed for the day.    If you are staying overnight following surgery, you will be transported to your hospital room following the recovery period.  [facility] has all private rooms.    If you have any questions please call Pre-Admission Testing at [phone].  Deductibles and co-payments are collected on the day of service. Please be prepared to pay the required co-pay, deductible or deposit on the day of service as defined by your plan.    Call your surgeon immediately if you experience any of the following symptoms:  Sore Throat  Shortness of Breath or difficulty breathing  Cough  Chills  Body soreness or muscle pain  Headache  Fever  New loss of taste or smell  Do not arrive for your surgery ill.  Your procedure will need to be rescheduled to another time.  You will need to call your physician before the day of surgery to avoid any unnecessary exposure to hospital staff as well as other patients.

## 2025-06-13 ENCOUNTER — ANESTHESIA EVENT (OUTPATIENT)
Dept: PERIOP | Facility: HOSPITAL | Age: 65
End: 2025-06-13
Payer: COMMERCIAL

## 2025-06-13 ENCOUNTER — HOSPITAL ENCOUNTER (OUTPATIENT)
Facility: HOSPITAL | Age: 65
Setting detail: HOSPITAL OUTPATIENT SURGERY
Discharge: HOME | End: 2025-06-13
Attending: UROLOGY | Admitting: UROLOGY
Payer: COMMERCIAL

## 2025-06-13 ENCOUNTER — ANESTHESIA (OUTPATIENT)
Dept: PERIOP | Facility: HOSPITAL | Age: 65
End: 2025-06-13
Payer: COMMERCIAL

## 2025-06-13 VITALS
HEIGHT: 64 IN | SYSTOLIC BLOOD PRESSURE: 137 MMHG | DIASTOLIC BLOOD PRESSURE: 73 MMHG | TEMPERATURE: 98.1 F | BODY MASS INDEX: 21.27 KG/M2 | HEART RATE: 78 BPM | RESPIRATION RATE: 16 BRPM | WEIGHT: 124.56 LBS | OXYGEN SATURATION: 96 %

## 2025-06-13 DIAGNOSIS — N20.0 RENAL CALCULUS, RIGHT: Primary | ICD-10-CM

## 2025-06-13 PROCEDURE — 25810000003 LACTATED RINGERS PER 1000 ML: Performed by: ANESTHESIOLOGY

## 2025-06-13 PROCEDURE — 25010000002 DEXAMETHASONE PER 1 MG: Performed by: NURSE ANESTHETIST, CERTIFIED REGISTERED

## 2025-06-13 PROCEDURE — 25010000002 MIDAZOLAM PER 1 MG: Performed by: ANESTHESIOLOGY

## 2025-06-13 PROCEDURE — 25010000002 CEFAZOLIN PER 500 MG: Performed by: UROLOGY

## 2025-06-13 PROCEDURE — 25010000002 FENTANYL CITRATE (PF) 50 MCG/ML SOLUTION: Performed by: NURSE ANESTHETIST, CERTIFIED REGISTERED

## 2025-06-13 PROCEDURE — 25010000002 FAMOTIDINE 10 MG/ML SOLUTION: Performed by: ANESTHESIOLOGY

## 2025-06-13 PROCEDURE — 25010000002 LIDOCAINE 2% SOLUTION: Performed by: NURSE ANESTHETIST, CERTIFIED REGISTERED

## 2025-06-13 PROCEDURE — 25010000002 PROPOFOL 10 MG/ML EMULSION: Performed by: NURSE ANESTHETIST, CERTIFIED REGISTERED

## 2025-06-13 PROCEDURE — 25010000002 GLYCOPYRROLATE 0.2 MG/ML SOLUTION: Performed by: NURSE ANESTHETIST, CERTIFIED REGISTERED

## 2025-06-13 PROCEDURE — 25010000002 ONDANSETRON PER 1 MG: Performed by: NURSE ANESTHETIST, CERTIFIED REGISTERED

## 2025-06-13 RX ORDER — DEXAMETHASONE SODIUM PHOSPHATE 4 MG/ML
INJECTION, SOLUTION INTRA-ARTICULAR; INTRALESIONAL; INTRAMUSCULAR; INTRAVENOUS; SOFT TISSUE AS NEEDED
Status: DISCONTINUED | OUTPATIENT
Start: 2025-06-13 | End: 2025-06-13 | Stop reason: SURG

## 2025-06-13 RX ORDER — ONDANSETRON 2 MG/ML
4 INJECTION INTRAMUSCULAR; INTRAVENOUS ONCE AS NEEDED
Status: DISCONTINUED | OUTPATIENT
Start: 2025-06-13 | End: 2025-06-13 | Stop reason: HOSPADM

## 2025-06-13 RX ORDER — LIDOCAINE HYDROCHLORIDE 10 MG/ML
0.5 INJECTION, SOLUTION INFILTRATION; PERINEURAL ONCE AS NEEDED
Status: DISCONTINUED | OUTPATIENT
Start: 2025-06-13 | End: 2025-06-13 | Stop reason: HOSPADM

## 2025-06-13 RX ORDER — SODIUM CHLORIDE 0.9 % (FLUSH) 0.9 %
3-10 SYRINGE (ML) INJECTION AS NEEDED
Status: DISCONTINUED | OUTPATIENT
Start: 2025-06-13 | End: 2025-06-13 | Stop reason: HOSPADM

## 2025-06-13 RX ORDER — LIDOCAINE HYDROCHLORIDE 20 MG/ML
INJECTION, SOLUTION INFILTRATION; PERINEURAL AS NEEDED
Status: DISCONTINUED | OUTPATIENT
Start: 2025-06-13 | End: 2025-06-13 | Stop reason: SURG

## 2025-06-13 RX ORDER — PHENYLEPHRINE HCL IN 0.9% NACL 1 MG/10 ML
SYRINGE (ML) INTRAVENOUS AS NEEDED
Status: DISCONTINUED | OUTPATIENT
Start: 2025-06-13 | End: 2025-06-13 | Stop reason: SURG

## 2025-06-13 RX ORDER — SODIUM CHLORIDE, SODIUM LACTATE, POTASSIUM CHLORIDE, CALCIUM CHLORIDE 600; 310; 30; 20 MG/100ML; MG/100ML; MG/100ML; MG/100ML
9 INJECTION, SOLUTION INTRAVENOUS CONTINUOUS
Status: DISCONTINUED | OUTPATIENT
Start: 2025-06-13 | End: 2025-06-13 | Stop reason: HOSPADM

## 2025-06-13 RX ORDER — FAMOTIDINE 10 MG/ML
20 INJECTION, SOLUTION INTRAVENOUS ONCE
Status: DISCONTINUED | OUTPATIENT
Start: 2025-06-13 | End: 2025-06-13 | Stop reason: HOSPADM

## 2025-06-13 RX ORDER — FENTANYL CITRATE 0.05 MG/ML
INJECTION, SOLUTION INTRAMUSCULAR; INTRAVENOUS AS NEEDED
Status: DISCONTINUED | OUTPATIENT
Start: 2025-06-13 | End: 2025-06-13 | Stop reason: SURG

## 2025-06-13 RX ORDER — FENTANYL CITRATE 50 UG/ML
50 INJECTION, SOLUTION INTRAMUSCULAR; INTRAVENOUS ONCE AS NEEDED
Status: DISCONTINUED | OUTPATIENT
Start: 2025-06-13 | End: 2025-06-13 | Stop reason: HOSPADM

## 2025-06-13 RX ORDER — ATROPINE SULFATE 0.4 MG/ML
0.4 INJECTION, SOLUTION INTRAMUSCULAR; INTRAVENOUS; SUBCUTANEOUS ONCE AS NEEDED
Status: DISCONTINUED | OUTPATIENT
Start: 2025-06-13 | End: 2025-06-13 | Stop reason: HOSPADM

## 2025-06-13 RX ORDER — DROPERIDOL 2.5 MG/ML
0.62 INJECTION, SOLUTION INTRAMUSCULAR; INTRAVENOUS
Status: DISCONTINUED | OUTPATIENT
Start: 2025-06-13 | End: 2025-06-13 | Stop reason: HOSPADM

## 2025-06-13 RX ORDER — NALOXONE HCL 0.4 MG/ML
0.2 VIAL (ML) INJECTION AS NEEDED
Status: DISCONTINUED | OUTPATIENT
Start: 2025-06-13 | End: 2025-06-13 | Stop reason: HOSPADM

## 2025-06-13 RX ORDER — SODIUM CHLORIDE 0.9 % (FLUSH) 0.9 %
3 SYRINGE (ML) INJECTION EVERY 12 HOURS SCHEDULED
Status: DISCONTINUED | OUTPATIENT
Start: 2025-06-13 | End: 2025-06-13 | Stop reason: HOSPADM

## 2025-06-13 RX ORDER — FAMOTIDINE 10 MG/ML
20 INJECTION, SOLUTION INTRAVENOUS ONCE
Status: COMPLETED | OUTPATIENT
Start: 2025-06-13 | End: 2025-06-13

## 2025-06-13 RX ORDER — ONDANSETRON 2 MG/ML
INJECTION INTRAMUSCULAR; INTRAVENOUS AS NEEDED
Status: DISCONTINUED | OUTPATIENT
Start: 2025-06-13 | End: 2025-06-13 | Stop reason: SURG

## 2025-06-13 RX ORDER — HYDROMORPHONE HYDROCHLORIDE 1 MG/ML
0.5 INJECTION, SOLUTION INTRAMUSCULAR; INTRAVENOUS; SUBCUTANEOUS
Status: DISCONTINUED | OUTPATIENT
Start: 2025-06-13 | End: 2025-06-13 | Stop reason: HOSPADM

## 2025-06-13 RX ORDER — EPHEDRINE SULFATE 50 MG/ML
5 INJECTION, SOLUTION INTRAVENOUS ONCE AS NEEDED
Status: DISCONTINUED | OUTPATIENT
Start: 2025-06-13 | End: 2025-06-13 | Stop reason: HOSPADM

## 2025-06-13 RX ORDER — OXYCODONE AND ACETAMINOPHEN 7.5; 325 MG/1; MG/1
1 TABLET ORAL EVERY 4 HOURS PRN
Status: DISCONTINUED | OUTPATIENT
Start: 2025-06-13 | End: 2025-06-13 | Stop reason: HOSPADM

## 2025-06-13 RX ORDER — FENTANYL CITRATE 50 UG/ML
50 INJECTION, SOLUTION INTRAMUSCULAR; INTRAVENOUS
Status: DISCONTINUED | OUTPATIENT
Start: 2025-06-13 | End: 2025-06-13 | Stop reason: HOSPADM

## 2025-06-13 RX ORDER — PROPOFOL 10 MG/ML
VIAL (ML) INTRAVENOUS AS NEEDED
Status: DISCONTINUED | OUTPATIENT
Start: 2025-06-13 | End: 2025-06-13 | Stop reason: SURG

## 2025-06-13 RX ORDER — PROMETHAZINE HYDROCHLORIDE 25 MG/1
25 TABLET ORAL ONCE AS NEEDED
Status: DISCONTINUED | OUTPATIENT
Start: 2025-06-13 | End: 2025-06-13 | Stop reason: HOSPADM

## 2025-06-13 RX ORDER — FLUMAZENIL 0.1 MG/ML
0.2 INJECTION INTRAVENOUS AS NEEDED
Status: DISCONTINUED | OUTPATIENT
Start: 2025-06-13 | End: 2025-06-13 | Stop reason: HOSPADM

## 2025-06-13 RX ORDER — IPRATROPIUM BROMIDE AND ALBUTEROL SULFATE 2.5; .5 MG/3ML; MG/3ML
3 SOLUTION RESPIRATORY (INHALATION) ONCE AS NEEDED
Status: DISCONTINUED | OUTPATIENT
Start: 2025-06-13 | End: 2025-06-13 | Stop reason: HOSPADM

## 2025-06-13 RX ORDER — DIPHENHYDRAMINE HYDROCHLORIDE 50 MG/ML
12.5 INJECTION, SOLUTION INTRAMUSCULAR; INTRAVENOUS
Status: DISCONTINUED | OUTPATIENT
Start: 2025-06-13 | End: 2025-06-13 | Stop reason: HOSPADM

## 2025-06-13 RX ORDER — HYDROCODONE BITARTRATE AND ACETAMINOPHEN 5; 325 MG/1; MG/1
1 TABLET ORAL EVERY 6 HOURS PRN
Qty: 12 TABLET | Refills: 0 | Status: SHIPPED | OUTPATIENT
Start: 2025-06-13

## 2025-06-13 RX ORDER — MIDAZOLAM HYDROCHLORIDE 1 MG/ML
1 INJECTION, SOLUTION INTRAMUSCULAR; INTRAVENOUS
Status: DISCONTINUED | OUTPATIENT
Start: 2025-06-13 | End: 2025-06-13 | Stop reason: HOSPADM

## 2025-06-13 RX ORDER — HYDROCODONE BITARTRATE AND ACETAMINOPHEN 5; 325 MG/1; MG/1
1 TABLET ORAL ONCE AS NEEDED
Status: DISCONTINUED | OUTPATIENT
Start: 2025-06-13 | End: 2025-06-13 | Stop reason: HOSPADM

## 2025-06-13 RX ORDER — HYDRALAZINE HYDROCHLORIDE 20 MG/ML
5 INJECTION INTRAMUSCULAR; INTRAVENOUS
Status: DISCONTINUED | OUTPATIENT
Start: 2025-06-13 | End: 2025-06-13 | Stop reason: HOSPADM

## 2025-06-13 RX ORDER — LABETALOL HYDROCHLORIDE 5 MG/ML
5 INJECTION, SOLUTION INTRAVENOUS
Status: DISCONTINUED | OUTPATIENT
Start: 2025-06-13 | End: 2025-06-13 | Stop reason: HOSPADM

## 2025-06-13 RX ORDER — PROMETHAZINE HYDROCHLORIDE 25 MG/1
25 SUPPOSITORY RECTAL ONCE AS NEEDED
Status: DISCONTINUED | OUTPATIENT
Start: 2025-06-13 | End: 2025-06-13 | Stop reason: HOSPADM

## 2025-06-13 RX ORDER — GLYCOPYRROLATE 0.2 MG/ML
INJECTION INTRAMUSCULAR; INTRAVENOUS AS NEEDED
Status: DISCONTINUED | OUTPATIENT
Start: 2025-06-13 | End: 2025-06-13 | Stop reason: SURG

## 2025-06-13 RX ORDER — CEPHALEXIN 500 MG/1
500 CAPSULE ORAL 3 TIMES DAILY
Qty: 12 CAPSULE | Refills: 0 | Status: SHIPPED | OUTPATIENT
Start: 2025-06-13 | End: 2025-06-17

## 2025-06-13 RX ADMIN — FAMOTIDINE 20 MG: 10 INJECTION INTRAVENOUS at 08:33

## 2025-06-13 RX ADMIN — PROPOFOL 150 MG: 10 INJECTION, EMULSION INTRAVENOUS at 09:43

## 2025-06-13 RX ADMIN — FENTANYL CITRATE 25 MCG: 50 INJECTION INTRAMUSCULAR; INTRAVENOUS at 09:50

## 2025-06-13 RX ADMIN — GLYCOPYRROLATE 0.1 MG: 0.2 INJECTION INTRAMUSCULAR; INTRAVENOUS at 09:38

## 2025-06-13 RX ADMIN — PROPOFOL 50 MG: 10 INJECTION, EMULSION INTRAVENOUS at 09:44

## 2025-06-13 RX ADMIN — CEFAZOLIN 2000 MG: 2 INJECTION, POWDER, FOR SOLUTION INTRAMUSCULAR; INTRAVENOUS at 09:33

## 2025-06-13 RX ADMIN — MIDAZOLAM 1 MG: 1 INJECTION INTRAMUSCULAR; INTRAVENOUS at 08:33

## 2025-06-13 RX ADMIN — Medication 3 ML: at 08:34

## 2025-06-13 RX ADMIN — Medication 100 MCG: at 09:53

## 2025-06-13 RX ADMIN — SODIUM CHLORIDE, POTASSIUM CHLORIDE, SODIUM LACTATE AND CALCIUM CHLORIDE 9 ML/HR: 600; 310; 30; 20 INJECTION, SOLUTION INTRAVENOUS at 08:32

## 2025-06-13 RX ADMIN — LIDOCAINE HYDROCHLORIDE 60 MG: 20 INJECTION, SOLUTION INFILTRATION; PERINEURAL at 09:43

## 2025-06-13 RX ADMIN — ONDANSETRON 4 MG: 2 INJECTION, SOLUTION INTRAMUSCULAR; INTRAVENOUS at 09:54

## 2025-06-13 RX ADMIN — DEXAMETHASONE SODIUM PHOSPHATE 6 MG: 4 INJECTION, SOLUTION INTRA-ARTICULAR; INTRALESIONAL; INTRAMUSCULAR; INTRAVENOUS; SOFT TISSUE at 09:53

## 2025-06-13 RX ADMIN — Medication 100 MCG: at 10:06

## 2025-06-13 NOTE — OP NOTE
Operative Report     [facility]    Patient: Bonnie Syed  Age:      64 y.o.  :     1960  Sex:      female    Medical Record:  [id number]    Date of Operation/Procedure:  2025    Pre-operative Diagnosis: Right renal calculus    Post-operative Diagnosis: Same    Surgeon: Neftali Victor MD    Name of Operation/Procedure:  Procedure(s) and Anesthesia Type:     * RIGHT EXTRACORPOREAL SHOCKWAVE LITHOTRIPSY - General    Findings/Complications: Solitary right renal stone    Description of procedure: 64-year-old female whose major complaint is bladder discomfort frequency and urgency has a history of stone disease and recently underwent lithotripsy for stones in the left kidney follow-up revealed a remaining solitary stone in the right kidney and she is elected proceed with treatment of the stone after discussion of benefits risk and alternative therapies of shockwave lithotripsy to her stone    The patient was brought to the lithosuite underwent general anesthesia and imaging with biplanar fluoroscopy identified her stone appeared to be a small stone in the area of the right lower pole after appropriate positioning she was treated with shockwaves on the Storz F2 we began in a low power setting at a low rate gradually increased to a maximum 120 shocks per minute power setting of 6 and total of the patient received 3000 shocks tolerated the procedure without complication was taken from the operating room in satisfactory condition she will [facility] weeks with a KUB     Estimated Blood Loss: none    Specimens: * No orders in the log *    Fluids/Drains: None    Curtis Victor MD  2025  09:53 EDT

## 2025-06-13 NOTE — ANESTHESIA PROCEDURE NOTES
Airway  Reason: elective    Date/Time: 6/13/2025 9:44 AM  Airway not difficult    General Information and Staff    Patient location during procedure: OR  CRNA/CAA: Patricia Arboleda CRNA    Indications and Patient Condition  Indications for airway management: airway protection    Preoxygenated: yes    Mask difficulty assessment: 0 - not attempted    Final Airway Details    Final airway type: supraglottic airway      Successful airway: LMA and unique  Size: 3   Number of attempts at approach: 1  Assessment: lips, teeth, and gum same as pre-op and atraumatic intubation

## 2025-06-13 NOTE — ANESTHESIA POSTPROCEDURE EVALUATION
"Patient: Bonnie Syed    Procedure Summary       Date: 06/13/25 Room / Location: [facility] OR 09 / [facility] MAIN OR    Anesthesia Start: 0937 Anesthesia Stop: 1021    Procedure: RIGHT EXTRACORPOREAL SHOCKWAVE LITHOTRIPSY (Right) Diagnosis:     Surgeons: Curtis Victor MD Provider: Jana Lindsay MD    Anesthesia Type: general ASA Status: 2            Anesthesia Type: general    Vitals  Vitals Value Taken Time   /83 06/13/25 11:00   Temp 36.7 °C (98.1 °F) 06/13/25 10:25   Pulse 71 06/13/25 11:07   Resp 16 06/13/25 10:45   SpO2 98 % 06/13/25 11:07   Vitals shown include unfiled device data.        Post Anesthesia Care and Evaluation    Patient location during evaluation: bedside  Patient participation: complete - patient participated  Level of consciousness: awake and alert  Pain management: adequate    Airway patency: patent  Anesthetic complications: No anesthetic complications    Cardiovascular status: acceptable  Respiratory status: acceptable  Hydration status: acceptable    Comments: /90 (BP Location: Right arm, Patient Position: Lying)   Pulse 80   Temp 36.7 °C (98.1 °F) (Oral)   Resp 16   Ht 162.6 cm (64\")   Wt 56.5 kg (124 lb 9 oz)   SpO2 (!) 80%   BMI 21.38 kg/m²     "

## 2025-06-13 NOTE — H&P
FIRST UROLOGY CONSULT      Patient Identification:  NAME:  Bonnie Syed  Age:  64 y.o.   Sex:  female   :  1960   MRN:  [id number]     Chief complaint: Right renal calculus    History of present illness:      64-year-old female who has had recent lithotripsy 2 stones in the left kidney she on follow-up was found to have a remaining stone in the right kidney and is elected proceed with surgical treatment of that stone.  He understands benefits risk and alternative therapies having had the procedure done previously    In hospital:  -[facility], good UOP  -WBC -   -Creat -   -UA -     -CT -        Asked to see    Past medical history:  Past Medical History:   Diagnosis Date    Allergic     takes 2 allergy injections weekly    Anxiety     Bladder stone     Breast cancer     left-- had a mastectomy, and chemo    Bursitis of hip     Cancer     left breast  with lymph node dissection  had reconstruction    Fracture, femur 2018    Frequent UTI     GERD (gastroesophageal reflux disease)     Headache     Sinus headaches, migraines about once a year    History of cancer chemotherapy     History of migraine     Hyperlipidemia     Hypertension     Interstitial cystitis     Juvenile arthritis     age 3 in left knee    Knee swelling     Migraine     Osteopenia     Paroxysmal SVT (supraventricular tachycardia)     Reflux gastritis     Renal stone     Rheumatoid arthritis     on monthly injection    Seasonal allergies     Seborrheic dermatitis     SCLAP AREA-DRY ITCHY SKIN    Unsteady gait     LEFT HIP    Visual impairment     Reading Glasses while on PC    Weakness     LEFT HIP       Past surgical history:  Past Surgical History:   Procedure Laterality Date    BREAST CAPSULOTOMY, IMPLANT REVISION Left     BREAST TISSUE EXPANDER REMOVAL INSERTION OF IMPLANT Left     BUNIONECTOMY Left     COLONOSCOPY      COLONOSCOPY N/A 2022    Procedure: COLONOSCOPY TO CECUM WITH COLD SNARE POLYPECTOMY;  Surgeon:  Jamie Reyes MD;  Location: [facility] ENDOSCOPY;  Service: Gastroenterology;  Laterality: N/A;  PRE- SCREENING  POST- COLON POLYP    D & C HYSTEROSCOPY      39 years ago    DILATATION AND CURETTAGE      EXTRACORPOREAL SHOCK WAVE LITHOTRIPSY (ESWL) Right 12/17/2021    Procedure: RIGHT EXTRACORPOREAL SHOCKWAVE LITHOTRIPSY AND CYSTOSCOPY;  Surgeon: Curtis Victor MD;  Location: [facility] OR OSC;  Service: Urology;  Laterality: Right;    EXTRACORPOREAL SHOCKWAVE LITHOTRIPSY (ESWL), STENT INSERTION/REMOVAL Left 05/09/2025    Procedure: LEFT SHOCKWAVE LITHOTRIPSY WITH CYSTOSCOPY FOR LEFT RENAL CALCULUS;  Surgeon: Curtis Victor MD;  Location: [facility] MAIN OR;  Service: Urology;  Laterality: Left;    FEMUR IM NAILING/RODDING Left 11/08/2018    Procedure: FEMUR INTRAMEDULLARY NAILING/RODDING;  Surgeon: Samy Altamirano II, MD;  Location: [facility] MAIN OR;  Service: Orthopedics    HARDWARE REMOVAL Left     femur when hip replacment was done    HYSTERECTOMY      KIDNEY STONE SURGERY  2024    x2    MASTECTOMY Left 2008    MALIGNANT  lymph nodes    NIPPLE TATTOO      SALPINGECTOMY Right     tubal preg    TISSUE EXPANDER PLACEMENT  2008    TOTAL HIP ARTHROPLASTY Left 01/25/2020    Procedure: left TOTAL HIP arthroplasty, orif femur and removal of hardware;  Surgeon: Samy Altamirano II, MD;  Location: [facility] OR;  Service: Orthopedics    VENOUS ACCESS DEVICE (PORT) INSERTION AND REMOVAL  2008       Allergies:  Ibandronate, Monistat [miconazole], Sulfa antibiotics, and Misc. sulfonamide containing compounds    Home medications:  Medications Prior to Admission   Medication Sig Dispense Refill Last Dose/Taking    ALLERGY SERUM INJECTION Inject  under the skin into the appropriate area as directed 1 (One) Time Per Week. monday       Biotin 5000 MCG sublingual tablet Place 1 tablet under the tongue Daily. PT TO HOLD FOR SURGERY       busPIRone (BUSPAR) 5 MG tablet Take 1 tablet by mouth 2 (Two) Times a  Day. 180 tablet 0     calcium carbonate-vitamin d (CALTRATE 600+D) 600-400 MG-UNIT per tablet One tab po BID for osteopenia (Patient taking differently: Take 1 tablet by mouth 2 (Two) Times a Day. PT TO HOLD FOR SURGERY)       clobetasol (TEMOVATE) 0.05 % ointment Apply 1 Application topically to the appropriate area as directed As Needed.       estradiol (ESTRACE) 0.1 MG/GM vaginal cream Insert 1 g into the vagina 2 (Two) Times a Week. TUESDAYS AND FRIDAYS       inFLIXimab (REMICADE IV) Infuse  into a venous catheter Every 30 (Thirty) Days. Once a month for arthritis       levocetirizine (XYZAL) 5 MG tablet Take 1 tablet by mouth Every Evening.       metoprolol succinate XL (TOPROL-XL) 50 MG 24 hr tablet Take 1 tablet by mouth Daily. 90 tablet 3     montelukast (SINGULAIR) 10 MG tablet Take 1 tablet by mouth Daily.       Multiple Vitamin (MULTIVITAMIN) capsule Take 1 capsule by mouth Daily. HOLD PRIOR TO SURGERY       Multiple Vitamins-Minerals (Hair Skin & Nails) tablet Take 1 tablet by mouth Daily. PT TO HOLD FOR SURGERY       Phenazopyridine HCl (AZO URINARY PAIN PO) Take 1 tablet by mouth As Needed.       rosuvastatin (CRESTOR) 10 MG tablet TAKE 1 TABLET BY MOUTH EVERY DAY AT NIGHT 90 tablet 2     valsartan (Diovan) 80 MG tablet Take 1 tablet by mouth Daily. (Patient taking differently: Take 1 tablet by mouth Daily. HOLD AM OF SURGERY) 90 tablet 2         Hospital medications:  ceFAZolin, 2,000 mg, Intravenous, Once             Family history:  Family History   Problem Relation Age of Onset    Hypertension Mother     Arthritis Mother     Hyperlipidemia Mother     Hypertension Father     Heart attack Maternal Grandfather     Breast cancer Neg Hx     Colon cancer Neg Hx     Malig Hyperthermia Neg Hx        Social history:  Social History     Tobacco Use    Smoking status: Never     Passive exposure: Never    Smokeless tobacco: Never   Vaping Use    Vaping status: Never Used   Substance Use Topics    Alcohol use:  No    Drug use: Never       Review of systems:      Positive for:  nothing  Negative for:  chest pain, cough, sob, o/w neg    Objective:  TMax 24 hours:   Temp (24hrs), Av °F (36.7 °C), Min:98 °F (36.7 °C), Max:98 °F (36.7 °C)      Vitals Ranges:   Temp:  [98 °F (36.7 °C)] 98 °F (36.7 °C)  Heart Rate:  [117] 117  Resp:  [18] 18  BP: (139)/(107) 139/107    Intake/Output Last 3 shifts:  No intake/output data recorded.     Physical Exam:    General Appearance:    Alert, cooperative, NAD   Back:     No CVA tenderness   Lungs:     Respirations unlabored, no wheezing    Heart:    RRR, intact peripheral pulses   Abdomen:     Soft, NDNT, no masses, no guarding   :    Pelvic not performed, bladder nondistended and nontender   Neuro/Psych:   Orientation intact, mood/affect pleasant       Results review:   I reviewed the patient's new clinical results.    Data review:  Lab Results (last 24 hours)       ** No results found for the last 24 hours. **             Imaging:  Imaging Results (Last 24 Hours)       ** No results found for the last 24 hours. **             Assessment:     Right renal calculus    Plan:     Right extracorporeal shockwave lithotripsy    Curtis Victor MD  25  08:04 EDT

## 2025-06-13 NOTE — ANESTHESIA PREPROCEDURE EVALUATION
Anesthesia Evaluation     NPO Solid Status: > 8 hours             Airway   Mallampati: II  Dental      Pulmonary    (-) not a smoker  Cardiovascular     (+) hypertension, hyperlipidemia      Neuro/Psych  GI/Hepatic/Renal/Endo    (+) GERD    Musculoskeletal     Abdominal    Substance History      OB/GYN          Other                    Anesthesia Plan    ASA 2     general       Anesthetic plan, risks, benefits, and alternatives have been provided, discussed and informed consent has been obtained with: patient.    CODE STATUS:

## 2025-07-14 RX ORDER — VALSARTAN 80 MG/1
80 TABLET ORAL DAILY
Qty: 90 TABLET | Refills: 2 | Status: SHIPPED | OUTPATIENT
Start: 2025-07-14

## 2025-07-14 RX ORDER — VALSARTAN 40 MG/1
40 TABLET ORAL DAILY
Qty: 90 TABLET | Refills: 2 | OUTPATIENT
Start: 2025-07-14

## 2025-07-28 DIAGNOSIS — F41.9 ANXIETY: ICD-10-CM

## 2025-07-28 RX ORDER — BUSPIRONE HYDROCHLORIDE 5 MG/1
5 TABLET ORAL 2 TIMES DAILY
Qty: 180 TABLET | Refills: 0 | Status: SHIPPED | OUTPATIENT
Start: 2025-07-28

## 2025-07-28 RX ORDER — BUSPIRONE HYDROCHLORIDE 5 MG/1
5 TABLET ORAL 2 TIMES DAILY
Qty: 180 TABLET | Refills: 0 | Status: SHIPPED | OUTPATIENT
Start: 2025-07-28 | End: 2025-07-28

## (undated) DEVICE — DRSNG SURESITE WNDW 4X4.5

## (undated) DEVICE — NITINOL WIRE WITH HYDROPHILIC TIP: Brand: SENSOR

## (undated) DEVICE — GLV SURG SENSICARE PI MIC PF SZ8 LF STRL

## (undated) DEVICE — MAT FLR ABSORBENT LG 4FT 10 2.5FT

## (undated) DEVICE — WEBRIL* CAST PADDING: Brand: DEROYAL

## (undated) DEVICE — REFLECTION FLEXIBLE DRILL 25MM: Brand: REFLECTION

## (undated) DEVICE — ENCORE® LATEX ORTHO SIZE 8.5, STERILE LATEX POWDER-FREE SURGICAL GLOVE: Brand: ENCORE

## (undated) DEVICE — NEEDLE, QUINCKE, 20GX3.5": Brand: MEDLINE

## (undated) DEVICE — GLV SURG SENSICARE GREEN W/ALOE PF LF 8.5 STRL

## (undated) DEVICE — VIOLET BRAIDED (POLYGLACTIN 910), SYNTHETIC ABSORBABLE SUTURE: Brand: COATED VICRYL

## (undated) DEVICE — GLV SURG SIGNATURE ESSENTIAL PF LTX SZ8.5

## (undated) DEVICE — DRSNG WND GZ CURAD OIL EMULSION 3X8IN LF STRL 1PK

## (undated) DEVICE — SNAR POLYP CAPTIVATOR RND STFF 2.4 240CM 10MM 1P/U

## (undated) DEVICE — TUBING, SUCTION, 1/4" X 10', STRAIGHT: Brand: MEDLINE

## (undated) DEVICE — GOWN,PREVENTION PLUS,XLONG/XLARGE,STRL: Brand: MEDLINE

## (undated) DEVICE — SENSR O2 OXIMAX FNGR A/ 18IN NONSTR

## (undated) DEVICE — ADAPT CLN BIOGUARD AIR/H2O DISP

## (undated) DEVICE — ADHS SKIN DERMABOND TOP ADVANCED

## (undated) DEVICE — SPNG GZ WOVN 4X4IN 12PLY 10/BX STRL

## (undated) DEVICE — SOL ISO/ALC RUB 70PCT 4OZ

## (undated) DEVICE — APPL DURAPREP IODOPHOR APL 26ML

## (undated) DEVICE — 3.0MM X 1000MM BALL TIP GUIDE ROD: Brand: TRIGEN

## (undated) DEVICE — DRSNG BURN ACTICOAT FLEX 7 1X24IN

## (undated) DEVICE — BNDG ELAS CO-FLEX SLF ADHR 6IN 5YD LF STRL

## (undated) DEVICE — NDL SPINE 20G 3 1/2 YEL STRL 1P/U

## (undated) DEVICE — PICO 7 10CM X 30CM: Brand: PICO™ 7

## (undated) DEVICE — SUT VICRYL 1 CT1 27IN  JJ40G

## (undated) DEVICE — BANDAGE,GAUZE,BULKEE II,4.5"X4.1YD,STRL: Brand: MEDLINE

## (undated) DEVICE — GUIDE PIN 3.2MM X 343MM: Brand: TRIGEN

## (undated) DEVICE — LOU CYSTO: Brand: MEDLINE INDUSTRIES, INC.

## (undated) DEVICE — SYR LUERLOK 20CC BX/50

## (undated) DEVICE — A SINGLE USE BEAD MOLD FOR MOLDING BEADS OUT OF CERAMIC BONE GRAFT SUBSTITUTES. THE BEAD MOLD CONSISTS OF CAVITIES IN THREE DIFFERENT SIZES. THE CAVITIES ARE FILLED WITH CERAMIC BONE GRAFT SUBSTITUTE AND EVENLY DISTRIBUTED ACROSS THE MOLD USING THE SPATULA. THE CERAMIC BONE GRAFT SUBSTITUTE SETS IN THE CAVITIES AND THE CERAMIC BONE GRAFT SUBSTITUTE BEADS ARE THEN REMOVED FROM THE MOLD.: Brand: CERAMENT™ BEAD TRAY

## (undated) DEVICE — TUBING, SUCTION, 1/4" X 20', STRAIGHT: Brand: MEDLINE INDUSTRIES, INC.

## (undated) DEVICE — DRP C/ARMOR

## (undated) DEVICE — SYR LUERLOK 20CC

## (undated) DEVICE — LN SMPL CO2 SHTRM SD STREAM W/M LUER

## (undated) DEVICE — APPL CHLORAPREP W/TINT 26ML ORNG

## (undated) DEVICE — PK HIP PINNING 40

## (undated) DEVICE — GLV SURG PREMIERPRO ORTHO LTX PF SZ8.5 BRN

## (undated) DEVICE — ZIP 24 SURGICAL SKIN CLOSURE DEVICE, PSA: Brand: ZIP 24 SURGICAL SKIN CLOSURE DEVICE

## (undated) DEVICE — CATH URETRL FLXITP POLLACK STD 5F 70CM

## (undated) DEVICE — CANN O2 ETCO2 FITS ALL CONN CO2 SMPL A/ 7IN DISP LF

## (undated) DEVICE — GAUZE,SPONGE,FLUFF,6"X6.75",STRL,10/TRAY: Brand: MEDLINE

## (undated) DEVICE — STPLR SKIN VISISTAT WD 35CT

## (undated) DEVICE — KT ORCA ORCAPOD DISP STRL

## (undated) DEVICE — 4.0MM SHORT PILOT DRILL WITH AO CONNECTOR: Brand: TRIGEN

## (undated) DEVICE — JELLY,LUBE,STERILE,FLIP TOP,TUBE,4-OZ: Brand: MEDLINE

## (undated) DEVICE — PK HIP TOTL 40

## (undated) DEVICE — THE SINGLE USE ETRAP – POLYP TRAP IS USED FOR SUCTION RETRIEVAL OF ENDOSCOPICALLY REMOVED POLYPS.: Brand: ETRAP